# Patient Record
Sex: FEMALE | Race: WHITE | NOT HISPANIC OR LATINO | Employment: FULL TIME | URBAN - METROPOLITAN AREA
[De-identification: names, ages, dates, MRNs, and addresses within clinical notes are randomized per-mention and may not be internally consistent; named-entity substitution may affect disease eponyms.]

---

## 2017-09-14 ENCOUNTER — TELEPHONE (OUTPATIENT)
Dept: HEMATOLOGY/ONCOLOGY | Facility: CLINIC | Age: 62
End: 2017-09-14

## 2017-09-15 ENCOUNTER — TELEPHONE (OUTPATIENT)
Dept: HEMATOLOGY/ONCOLOGY | Facility: CLINIC | Age: 62
End: 2017-09-15

## 2017-09-18 ENCOUNTER — TELEPHONE (OUTPATIENT)
Dept: HEMATOLOGY/ONCOLOGY | Facility: CLINIC | Age: 62
End: 2017-09-18

## 2017-09-19 ENCOUNTER — INITIAL CONSULT (OUTPATIENT)
Dept: HEMATOLOGY/ONCOLOGY | Facility: CLINIC | Age: 62
End: 2017-09-19
Payer: COMMERCIAL

## 2017-09-19 VITALS
SYSTOLIC BLOOD PRESSURE: 122 MMHG | BODY MASS INDEX: 21.6 KG/M2 | HEIGHT: 60 IN | HEART RATE: 63 BPM | RESPIRATION RATE: 18 BRPM | DIASTOLIC BLOOD PRESSURE: 81 MMHG | WEIGHT: 110 LBS

## 2017-09-19 DIAGNOSIS — R79.89 ABNORMAL CBC: ICD-10-CM

## 2017-09-19 DIAGNOSIS — Z51.81 ENCOUNTER FOR MONITORING LOW DOSE ASPIRIN THERAPY: ICD-10-CM

## 2017-09-19 DIAGNOSIS — Z86.718 HISTORY OF DVT (DEEP VEIN THROMBOSIS): ICD-10-CM

## 2017-09-19 DIAGNOSIS — Z79.82 ENCOUNTER FOR MONITORING LOW DOSE ASPIRIN THERAPY: ICD-10-CM

## 2017-09-19 DIAGNOSIS — R55 SYNCOPE, UNSPECIFIED SYNCOPE TYPE: Primary | ICD-10-CM

## 2017-09-19 PROCEDURE — 3008F BODY MASS INDEX DOCD: CPT | Mod: S$GLB,,, | Performed by: INTERNAL MEDICINE

## 2017-09-19 PROCEDURE — 99205 OFFICE O/P NEW HI 60 MIN: CPT | Mod: S$GLB,,, | Performed by: INTERNAL MEDICINE

## 2017-09-19 NOTE — LETTER
September 19, 2017      Tamara Roldan MD  [address] [place] [address]           [place] [facility]  [address] [place] [address]  Phone: [phone]          Patient: Lisbeth Seaman   MR Number: [id number]   YOB: 1955   Date of Visit: 9/19/2017       Dear Dr. Tamara Roldan:    Thank you for referring Lisbeth Seaman to me for evaluation. Attached you will find relevant portions of my assessment and plan of care.    If you have questions, please do not hesitate to call me. I look forward to following Lisbeth Seaman along with you.    Sincerely,    Sarah Thorne MD    Enclosure  CC:  No Recipients    If you would like to receive this communication electronically, please contact [email or web address] or [phone] to request more information on [employer] Link access.    For providers and/or their staff who would like to refer a patient to [facility], please contact us through our one-stop-shop provider referral line, [facility] , at [phone].    If you feel you have received this communication in error or would no longer like to receive these types of communications, please e-mail [email or web address]

## 2017-09-19 NOTE — PROGRESS NOTES
Consult (dr Gonzalez - secondary polycythemia)      Lisbeth Seaman is a 62 y.o.  Pt here after being seen in ER for possible hypercoagulable state. She reports TIA like symptoms. She has fully recovered from the event. She is now on aspirin 81 mg daily. Her family member with her today thought they were told she may have had a seizure. In any event she does have a history of blood clots. She is unsure where the clots were located but she believes it was ten years ago and she did take coumadin. Today she is free of dizziness, no weakness, she is slightly fatigued. Pt states she has never noticed that she snores at night, she does not have pruritus after a warm shower, she does not have a marcia appearance and she does not smoke.      Referral to heme states possible polycythemia    PMH HTN DVT  PSH unremarkable  SOC Hx former smoker: she quit about 9 years ago  No current use of alcohol  Fam hx: Father  with heart attack    Meds noted  Review of patient's allergies indicates:  No Known Allergies  Social History   Substance Use Topics    Smoking status: Not on file    Smokeless tobacco: Not on file    Alcohol use Not on file         CONSTITUTIONAL: No fevers, chills, night sweats, wt. loss, appetite changes pos fatigue  SKIN: no rashes or itching  ENT: No headaches, head trauma, vision changes, or eye pain  LYMPH NODES: None noticed   CV: No chest pain, palpitations.   RESP: No dyspnea on exertion, cough, wheezing, or hemoptysis  GI: No nausea, emesis, diarrhea, constipation, melena, hematochezia, pain.   : No dysuria, hematuria, urgency, or frequency   HEME: No easy bruising, bleeding problems  PSYCHIATRIC: No depression, anxiety, psychosis, hallucinations.  NEURO: No seizures, memory loss, dizziness or difficulty sleeping  MSK: No arthralgias or joint swelling         /81   Pulse 63   Resp 18   Ht 5' (1.524 m)   Wt 49.9 kg (110 lb)   BMI 21.48 kg/m²   Gen: NAD, A and O x3,   Psych: pleasant  affect, normal thought process  Eyes: Pupils round and non dilated, EOM intact  Nose: Nares patent  OP clear, mucosa patent  Neck: suppple, no JVD, trachea midline, no palpable mass, no adenopathy  Lungs: CTAB, no wheezes, no use of accessory muscles  CV: S1S2 with RRR, No mrg  Abd: soft, NTND, + BS, No HSM, no ascites  Extr: No CCE, AD, strength normal, good capillary refill  Neuro: steady gait, CNs grossly intact  Skin: intact, no lesions noted  Rheum: No joint swelling      No lab results available at this time   ER vascular neuro telemedicine note reviewed  Suspected postictal state     No imaging to review      Syncope, unspecified syncope type : TIA vs postictal episode    History of DVT (deep vein thrombosis)  -     Antithrombin III; Future; Expected date: 09/19/2017  -     Cardiolipin antibody; Future; Expected date: 09/19/2017  -     Beta-2 Glycoprotein I Ab,G,A,M; Future; Expected date: 09/19/2017  -     Factor 5 leiden; Future; Expected date: 09/19/2017  -     Factor 8 assay; Future; Expected date: 09/19/2017  -     Plasminogen activity; Future; Expected date: 09/19/2017  -     Protein C antigen, total; Future; Expected date: 09/19/2017  -     Protein S antigen, free; Future; Expected date: 09/19/2017  -     Prothrombin gene mutation; Future; Expected date: 09/19/2017  -     Fibrinogen; Future; Expected date: 09/19/2017  -     Methylmalonic acid, serum; Future; Expected date: 09/19/2017  -     EPO LEVEL; Future; Expected date: 09/19/2017  -     Lactate dehydrogenase; Future; Expected date: 09/19/2017  -     CBC auto differential; Future; Expected date: 09/19/2017  -     Carbon Monoxide, Blood; Future; Expected date: 09/19/2017    Encounter for monitoring low dose aspirin therapy    Abnormal CBC with presumed elevated hemooglobin  -     Antithrombin III; Future; Expected date: 09/19/2017  -     Cardiolipin antibody; Future; Expected date: 09/19/2017  -     Beta-2 Glycoprotein I Ab,G,A,M; Future; Expected  date: 09/19/2017  -     Factor 5 leiden; Future; Expected date: 09/19/2017  -     Factor 8 assay; Future; Expected date: 09/19/2017  -     Plasminogen activity; Future; Expected date: 09/19/2017  -     Protein C antigen, total; Future; Expected date: 09/19/2017  -     Protein S antigen, free; Future; Expected date: 09/19/2017  -     Prothrombin gene mutation; Future; Expected date: 09/19/2017  -     Fibrinogen; Future; Expected date: 09/19/2017  -     Methylmalonic acid, serum; Future; Expected date: 09/19/2017  -     EPO LEVEL; Future; Expected date: 09/19/2017  -     Lactate dehydrogenase; Future; Expected date: 09/19/2017  -     CBC auto differential; Future; Expected date: 09/19/2017  -     Carbon Monoxide, Blood; Future; Expected date: 09/19/2017    Will check results above and make further recs accordingly  Checking for inherited thrombophilia as well as for etiology of possible erythrocytosis  This will help with future management of this pleasant pt  Thank you for allowing me to evaluate and participate in the care of this pleasant patient. Please fell free to call me with any questions or concerns.    Warmly,  Sarah Thorne MD    This note was dictated with Dragon and briefly proofread. Please excuse any sentences that may be unclear or words misspelled

## 2017-10-10 ENCOUNTER — OFFICE VISIT (OUTPATIENT)
Dept: HEMATOLOGY/ONCOLOGY | Facility: CLINIC | Age: 62
End: 2017-10-10
Payer: COMMERCIAL

## 2017-10-10 VITALS
SYSTOLIC BLOOD PRESSURE: 88 MMHG | DIASTOLIC BLOOD PRESSURE: 69 MMHG | HEIGHT: 60 IN | HEART RATE: 73 BPM | BODY MASS INDEX: 21.99 KG/M2 | RESPIRATION RATE: 18 BRPM | WEIGHT: 112 LBS

## 2017-10-10 DIAGNOSIS — Z86.718 HISTORY OF DVT (DEEP VEIN THROMBOSIS): ICD-10-CM

## 2017-10-10 DIAGNOSIS — D75.1 ERYTHROCYTOSIS: Primary | ICD-10-CM

## 2017-10-10 DIAGNOSIS — I95.9 HYPOTENSIVE EPISODE: ICD-10-CM

## 2017-10-10 DIAGNOSIS — E53.8 B12 DEFICIENCY: ICD-10-CM

## 2017-10-10 PROCEDURE — 99215 OFFICE O/P EST HI 40 MIN: CPT | Mod: S$GLB,,, | Performed by: INTERNAL MEDICINE

## 2017-10-10 RX ORDER — LOSARTAN POTASSIUM AND HYDROCHLOROTHIAZIDE 25; 100 MG/1; MG/1
1 TABLET ORAL DAILY
Status: ON HOLD | COMMUNITY
Start: 2017-09-28 | End: 2018-03-03 | Stop reason: HOSPADM

## 2017-10-10 RX ORDER — POTASSIUM CHLORIDE 1500 MG/1
20 TABLET, EXTENDED RELEASE ORAL DAILY
Status: ON HOLD | COMMUNITY
Start: 2017-09-28 | End: 2018-03-07

## 2017-10-10 RX ORDER — ATORVASTATIN CALCIUM 40 MG/1
40 TABLET, FILM COATED ORAL DAILY
Status: ON HOLD | COMMUNITY
Start: 2017-09-28 | End: 2018-03-07

## 2017-10-10 NOTE — LETTER
October 10, 2017        Tamara Roldan MD  [address] [place] [address]             [place] [facility]  [address] [place] [address]  Phone: [phone]   Patient: Lisbeth Seaman   MR Number: [id number]   YOB: 1955   Date of Visit: 10/10/2017       Dear Dr. Roldan:    Thank you for referring Lisbeth Seaman to me for evaluation. Attached you will find relevant portions of my assessment and plan of care.    If you have questions, please do not hesitate to call me. I look forward to following Lisbeth Seaman along with you.    Sincerely,      Sarah Thorne MD            CC  No Recipients    Enclosure

## 2017-10-10 NOTE — PROGRESS NOTES
Follow-up and Results (labs at [place])      Lisbeth Seaman is a 62 y.o.  Pt here after being seen in ER for possible hypercoagulable state. She was found to have an elevated RBC count and is here todiscuss lab results  She aslo had a hypercoag workup performed  Results reveal no evidence of inherited thrombophilia     Pt is not having dizziness or weakness , her pressure is low today  She has not eaten yet today    Current Outpatient Prescriptions:     atorvastatin (LIPITOR) 40 MG tablet, , Disp: , Rfl:     losartan-hydrochlorothiazide 100-25 mg (HYZAAR) 100-25 mg per tablet, , Disp: , Rfl:     potassium chloride (K-TAB) 20 mEq, , Disp: , Rfl:       CONSTITUTIONAL: No fevers, chills, night sweats, wt. loss, appetite changes pos fatigue  SKIN: no rashes or itching  ENT: No headaches, head trauma, vision changes, or eye pain  LYMPH NODES: None noticed   CV: No chest pain, palpitations.   RESP: No dyspnea on exertion, cough, wheezing, or hemoptysis  GI: No nausea, emesis, diarrhea, constipation, melena, hematochezia, pain.   : No dysuria, hematuria, urgency, or frequency   HEME: No easy bruising  PSYCHIATRIC: No depression, anxiety  NEURO: No seizures, memory loss, dizziness or difficulty sleeping  MSK: No arthralgias or joint swelling         BP (!) 88/69   Pulse 73   Resp 18   Ht 5' (1.524 m)   Wt 50.8 kg (112 lb)   BMI 21.87 kg/m²   Gen: NAD, A and O x3,   Psych: pleasant affect, normal thought process  Eyes: Pupils round and non dilated, EOM intact  Nose: Nares patent  OP clear, mucosa patent  Neck: suppple, no JVD, trachea midline  Lungsno use of accessory muscles  CV: S1S2 with RR  Abd: soft no ascites  Extr: No CCE, AD  Skin: intact, no lesions noted  Rheum: No joint swelling    CO 2.0  Plasminogen 112  epo 2.3 low  Methylmalonic acid 1571 elevated   No lab results available at this time   ER vascular neuro telemedicine note reviewed  Suspected postictal state   Hb above 16     Erythrocytosis  -      Jak2 V617F Mutation Detection, Blood; Future; Expected date: 10/10/2017  -     JAK2 Exon 12 And Other Non-V617 Mutation Detection, Bld; Future; Expected date: 10/10/2017  -     US Abdomen Complete; Future; Expected date: 10/10/2017  -     CBC auto differential; Future; Expected date: 10/10/2017  -     CT Biopsy Bone Deep (xpd); Future; Expected date: 10/10/2017  -     Leukemia/Lymphoma Screen - Bone Marrow Right Posterior Iliac Crest; Future; Expected date: 10/10/2017  -     Chromosome Analysis, Bone Marrow; Future; Expected date: 10/10/2017  -     Iron Stain, Bone Marrow; Future; Expected date: 10/10/2017  -     JAK2 V617F Mutation Detection, Bone Marrow; Future; Expected date: 10/10/2017    Hypotensive episode  -     Jak2 V617F Mutation Detection, Blood; Future; Expected date: 10/10/2017  -     JAK2 Exon 12 And Other Non-V617 Mutation Detection, Bld; Future; Expected date: 10/10/2017  -     US Abdomen Complete; Future; Expected date: 10/10/2017  -     CBC auto differential; Future; Expected date: 10/10/2017    History of DVT (deep vein thrombosis)  -     CT Biopsy Bone Deep (xpd); Future; Expected date: 10/10/2017  -     Leukemia/Lymphoma Screen - Bone Marrow Right Posterior Iliac Crest; Future; Expected date: 10/10/2017  -     Chromosome Analysis, Bone Marrow; Future; Expected date: 10/10/2017  -     Iron Stain, Bone Marrow; Future; Expected date: 10/10/2017  -     JAK2 V617F Mutation Detection, Bone Marrow; Future; Expected date: 10/10/2017    B12 deficiency  -     CT Biopsy Bone Deep (xpd); Future; Expected date: 10/10/2017  -     Leukemia/Lymphoma Screen - Bone Marrow Right Posterior Iliac Crest; Future; Expected date: 10/10/2017  -     Chromosome Analysis, Bone Marrow; Future; Expected date: 10/10/2017  -     Iron Stain, Bone Marrow; Future; Expected date: 10/10/2017  -     JAK2 V617F Mutation Detection, Bone Marrow; Future; Expected date: 10/10/2017      I am concerned about her hypotension she needs to eat  a salty meal and I'm recommending that she cut her blood pressure pill in half and see her primary care physician tomorrow  I also explained her B12 deficiency.  I don't want to replace her B12 as it yet due to the fact that she has erythrocytosis and replace her B12 will worsen the erythrocytosis and could cause a hypercoagulable state.  I'm sending her for a bone marrow biopsy to evaluate further.  Usually polycythemia vera will present with Woodrow 2 mutation and I'm checking her for both exon deletions to verify such.  She'll also undergone abdominal ultrasound to look for splenomegaly.  When she has the bone marrow biopsy further lab work and ultrasound performed and I'll see her back in clinic and make further recommendations.  I explained to her and her son is soon as I know is overstimulated her marrow should be able to help prevent further overstimulation released off her phlebotomy to get her counts down.  I explained that her bone marrow is not being stimulated by her kidneys as evidenced by an erythropoietin being lower than normal at 2.3.  Carbon monoxide level was also normal  She is to eat regularly throughout the day    Thank you for allowing me to evaluate and participate in the care of this pleasant patient. Please fell free to call me with any questions or concerns.    Sarah Patterson MD    This note was dictated with Dragon and briefly proofread. Please excuse any sentences that may be unclear or words misspelled

## 2017-10-12 DIAGNOSIS — R79.89 ABNORMAL CBC: Primary | ICD-10-CM

## 2017-10-12 NOTE — NURSING
Returned call from [facility], patient gave permission to speak with Scooter her son, notified of date and time of procedure, Scooter requested to reschedule, call transferred to Kath in scheduling. AR

## 2017-10-17 NOTE — NURSING
reutned pts son Scooter call, no answer left voicemail explaining these instructions could not be left on a voicemail, he will need a paper to write them down as we discuss them and requested a return call. AR

## 2017-10-18 NOTE — NURSING
Spoke to patients maryanne Viveros, procedure date 10/25/17 procedure time 10 am, arrival time 830, all preop instructions and Scooter repeated them back verbalized understanding, AR

## 2017-10-18 NOTE — NURSING
pts son Scooter left voicemail stating he didn't go to work today until 4 PM, nurse returned call no answer and now voicemail is fulll and cannot accept any other messages. AR

## 2017-10-18 NOTE — NURSING
Placed call to patients maryanne Helms to provide preop instructions for procedure, no answer left voicemail requesting return call. AR

## 2017-10-20 ENCOUNTER — HOSPITAL ENCOUNTER (OUTPATIENT)
Dept: RADIOLOGY | Facility: HOSPITAL | Age: 62
Discharge: HOME | End: 2017-10-20
Attending: INTERNAL MEDICINE
Payer: COMMERCIAL

## 2017-10-25 ENCOUNTER — HOSPITAL ENCOUNTER (OUTPATIENT)
Dept: RADIOLOGY | Facility: HOSPITAL | Age: 62
Discharge: HOME | End: 2017-10-25
Attending: INTERNAL MEDICINE
Payer: COMMERCIAL

## 2017-10-25 VITALS
HEIGHT: 61 IN | SYSTOLIC BLOOD PRESSURE: 117 MMHG | TEMPERATURE: 98 F | HEART RATE: 68 BPM | OXYGEN SATURATION: 99 % | RESPIRATION RATE: 16 BRPM | BODY MASS INDEX: 21.14 KG/M2 | DIASTOLIC BLOOD PRESSURE: 88 MMHG | WEIGHT: 112 LBS

## 2017-10-25 DIAGNOSIS — Z86.718 HISTORY OF DVT (DEEP VEIN THROMBOSIS): ICD-10-CM

## 2017-10-25 DIAGNOSIS — I95.9 HYPOTENSIVE EPISODE: ICD-10-CM

## 2017-10-25 DIAGNOSIS — E53.8 B12 DEFICIENCY: ICD-10-CM

## 2017-10-25 DIAGNOSIS — D75.1 ERYTHROCYTOSIS: ICD-10-CM

## 2017-10-25 PROCEDURE — 76700 US EXAM ABDOM COMPLETE: CPT | Mod: TC

## 2017-10-25 PROCEDURE — 88341 IMHCHEM/IMCYTCHM EA ADD ANTB: CPT | Performed by: PATHOLOGY

## 2017-10-25 PROCEDURE — 88189 FLOWCYTOMETRY/READ 16 & >: CPT | Mod: ,,, | Performed by: PATHOLOGY

## 2017-10-25 PROCEDURE — 88185 FLOWCYTOMETRY/TC ADD-ON: CPT | Performed by: PATHOLOGY

## 2017-10-25 PROCEDURE — 77012 CT SCAN FOR NEEDLE BIOPSY: CPT | Mod: TC

## 2017-10-25 PROCEDURE — 85097 BONE MARROW INTERPRETATION: CPT | Mod: ,,, | Performed by: PATHOLOGY

## 2017-10-25 PROCEDURE — 88291 CYTO/MOLECULAR REPORT: CPT

## 2017-10-25 PROCEDURE — 88342 IMHCHEM/IMCYTCHM 1ST ANTB: CPT | Mod: 26,59,, | Performed by: PATHOLOGY

## 2017-10-25 PROCEDURE — 88311 DECALCIFY TISSUE: CPT | Mod: 26,,, | Performed by: PATHOLOGY

## 2017-10-25 PROCEDURE — 99900104 DSU ONLY-NO CHARGE-EA ADD'L HR (STAT)

## 2017-10-25 PROCEDURE — 76700 US EXAM ABDOM COMPLETE: CPT | Mod: 26,,, | Performed by: RADIOLOGY

## 2017-10-25 PROCEDURE — 88264 CHROMOSOME ANALYSIS 20-25: CPT

## 2017-10-25 PROCEDURE — 88305 TISSUE EXAM BY PATHOLOGIST: CPT | Mod: 26,,, | Performed by: PATHOLOGY

## 2017-10-25 PROCEDURE — 88237 TISSUE CULTURE BONE MARROW: CPT

## 2017-10-25 PROCEDURE — 88313 SPECIAL STAINS GROUP 2: CPT

## 2017-10-25 PROCEDURE — 77012 CT SCAN FOR NEEDLE BIOPSY: CPT | Mod: 26,,, | Performed by: RADIOLOGY

## 2017-10-25 PROCEDURE — 81270 JAK2 GENE: CPT

## 2017-10-25 PROCEDURE — 88184 FLOWCYTOMETRY/ TC 1 MARKER: CPT | Performed by: PATHOLOGY

## 2017-10-25 PROCEDURE — 63600175 PHARM REV CODE 636 W HCPCS

## 2017-10-25 PROCEDURE — 99152 MOD SED SAME PHYS/QHP 5/>YRS: CPT | Mod: ,,, | Performed by: RADIOLOGY

## 2017-10-25 PROCEDURE — 38221 DX BONE MARROW BIOPSIES: CPT | Mod: ,,, | Performed by: RADIOLOGY

## 2017-10-25 PROCEDURE — 88313 SPECIAL STAINS GROUP 2: CPT | Mod: 26,,, | Performed by: PATHOLOGY

## 2017-10-25 PROCEDURE — 88305 TISSUE EXAM BY PATHOLOGIST: CPT | Performed by: PATHOLOGY

## 2017-10-25 PROCEDURE — 99900103 DSU ONLY-NO CHARGE-INITIAL HR (STAT)

## 2017-10-25 PROCEDURE — 88341 IMHCHEM/IMCYTCHM EA ADD ANTB: CPT | Mod: 26,59,, | Performed by: PATHOLOGY

## 2017-10-25 RX ORDER — MIDAZOLAM HYDROCHLORIDE 5 MG/ML
INJECTION INTRAMUSCULAR; INTRAVENOUS
Status: COMPLETED
Start: 2017-10-25 | End: 2017-10-25

## 2017-10-25 RX ORDER — LIDOCAINE HYDROCHLORIDE 10 MG/ML
INJECTION, SOLUTION EPIDURAL; INFILTRATION; INTRACAUDAL; PERINEURAL
Status: DISPENSED
Start: 2017-10-25 | End: 2017-10-25

## 2017-10-25 RX ORDER — FENTANYL CITRATE 50 UG/ML
25 INJECTION, SOLUTION INTRAMUSCULAR; INTRAVENOUS ONCE
Status: COMPLETED | OUTPATIENT
Start: 2017-10-25 | End: 2017-10-25

## 2017-10-25 RX ORDER — MIDAZOLAM HYDROCHLORIDE 5 MG/ML
1 INJECTION INTRAMUSCULAR; INTRAVENOUS ONCE
Status: COMPLETED | OUTPATIENT
Start: 2017-10-25 | End: 2017-10-25

## 2017-10-25 RX ORDER — FENTANYL CITRATE 50 UG/ML
INJECTION, SOLUTION INTRAMUSCULAR; INTRAVENOUS
Status: COMPLETED
Start: 2017-10-25 | End: 2017-10-25

## 2017-10-25 RX ADMIN — FENTANYL CITRATE 25 MCG: 50 INJECTION INTRAMUSCULAR; INTRAVENOUS at 10:10

## 2017-10-25 RX ADMIN — MIDAZOLAM HYDROCHLORIDE 1 MG: 5 INJECTION INTRAMUSCULAR; INTRAVENOUS at 10:10

## 2017-10-25 RX ADMIN — FENTANYL CITRATE 25 MCG: 50 INJECTION, SOLUTION INTRAMUSCULAR; INTRAVENOUS at 10:10

## 2017-10-25 RX ADMIN — MIDAZOLAM 1 MG: 5 INJECTION INTRAMUSCULAR; INTRAVENOUS at 10:10

## 2017-10-25 NOTE — H&P (VIEW-ONLY)
Follow-up and Results (labs at [facility])      Lisbeth Seaman is a 62 y.o.  Pt here after being seen in ER for possible hypercoagulable state. She was found to have an elevated RBC count and is here todiscuss lab results  She aslo had a hypercoag workup performed  Results reveal no evidence of inherited thrombophilia     Pt is not having dizziness or weakness , her pressure is low today  She has not eaten yet today    Current Outpatient Prescriptions:     atorvastatin (LIPITOR) 40 MG tablet, , Disp: , Rfl:     losartan-hydrochlorothiazide 100-25 mg (HYZAAR) 100-25 mg per tablet, , Disp: , Rfl:     potassium chloride (K-TAB) 20 mEq, , Disp: , Rfl:       CONSTITUTIONAL: No fevers, chills, night sweats, wt. loss, appetite changes pos fatigue  SKIN: no rashes or itching  ENT: No headaches, head trauma, vision changes, or eye pain  LYMPH NODES: None noticed   CV: No chest pain, palpitations.   RESP: No dyspnea on exertion, cough, wheezing, or hemoptysis  GI: No nausea, emesis, diarrhea, constipation, melena, hematochezia, pain.   : No dysuria, hematuria, urgency, or frequency   HEME: No easy bruising  PSYCHIATRIC: No depression, anxiety  NEURO: No seizures, memory loss, dizziness or difficulty sleeping  MSK: No arthralgias or joint swelling         BP (!) 88/69   Pulse 73   Resp 18   Ht 5' (1.524 m)   Wt 50.8 kg (112 lb)   BMI 21.87 kg/m²   Gen: NAD, A and O x3,   Psych: pleasant affect, normal thought process  Eyes: Pupils round and non dilated, EOM intact  Nose: Nares patent  OP clear, mucosa patent  Neck: suppple, no JVD, trachea midline  Lungsno use of accessory muscles  CV: S1S2 with RR  Abd: soft no ascites  Extr: No CCE, AD  Skin: intact, no lesions noted  Rheum: No joint swelling    CO 2.0  Plasminogen 112  epo 2.3 low  Methylmalonic acid 1571 elevated   No lab results available at this time   ER vascular neuro telemedicine note reviewed  Suspected postictal state   Hb above 16     Erythrocytosis  -      Jak2 V617F Mutation Detection, Blood; Future; Expected date: 10/10/2017  -     JAK2 Exon 12 And Other Non-V617 Mutation Detection, Bld; Future; Expected date: 10/10/2017  -     US Abdomen Complete; Future; Expected date: 10/10/2017  -     CBC auto differential; Future; Expected date: 10/10/2017  -     CT Biopsy Bone Deep (xpd); Future; Expected date: 10/10/2017  -     Leukemia/Lymphoma Screen - Bone Marrow Right Posterior Iliac Crest; Future; Expected date: 10/10/2017  -     Chromosome Analysis, Bone Marrow; Future; Expected date: 10/10/2017  -     Iron Stain, Bone Marrow; Future; Expected date: 10/10/2017  -     JAK2 V617F Mutation Detection, Bone Marrow; Future; Expected date: 10/10/2017    Hypotensive episode  -     Jak2 V617F Mutation Detection, Blood; Future; Expected date: 10/10/2017  -     JAK2 Exon 12 And Other Non-V617 Mutation Detection, Bld; Future; Expected date: 10/10/2017  -     US Abdomen Complete; Future; Expected date: 10/10/2017  -     CBC auto differential; Future; Expected date: 10/10/2017    History of DVT (deep vein thrombosis)  -     CT Biopsy Bone Deep (xpd); Future; Expected date: 10/10/2017  -     Leukemia/Lymphoma Screen - Bone Marrow Right Posterior Iliac Crest; Future; Expected date: 10/10/2017  -     Chromosome Analysis, Bone Marrow; Future; Expected date: 10/10/2017  -     Iron Stain, Bone Marrow; Future; Expected date: 10/10/2017  -     JAK2 V617F Mutation Detection, Bone Marrow; Future; Expected date: 10/10/2017    B12 deficiency  -     CT Biopsy Bone Deep (xpd); Future; Expected date: 10/10/2017  -     Leukemia/Lymphoma Screen - Bone Marrow Right Posterior Iliac Crest; Future; Expected date: 10/10/2017  -     Chromosome Analysis, Bone Marrow; Future; Expected date: 10/10/2017  -     Iron Stain, Bone Marrow; Future; Expected date: 10/10/2017  -     JAK2 V617F Mutation Detection, Bone Marrow; Future; Expected date: 10/10/2017      I am concerned about her hypotension she needs to eat  a salty meal and I'm recommending that she cut her blood pressure pill in half and see her primary care physician tomorrow  I also explained her B12 deficiency.  I don't want to replace her B12 as it yet due to the fact that she has erythrocytosis and replace her B12 will worsen the erythrocytosis and could cause a hypercoagulable state.  I'm sending her for a bone marrow biopsy to evaluate further.  Usually polycythemia vera will present with Woodrow 2 mutation and I'm checking her for both exon deletions to verify such.  She'll also undergone abdominal ultrasound to look for splenomegaly.  When she has the bone marrow biopsy further lab work and ultrasound performed and I'll see her back in clinic and make further recommendations.  I explained to her and her son is soon as I know is overstimulated her marrow should be able to help prevent further overstimulation released off her phlebotomy to get her counts down.  I explained that her bone marrow is not being stimulated by her kidneys as evidenced by an erythropoietin being lower than normal at 2.3.  Carbon monoxide level was also normal  She is to eat regularly throughout the day    Thank you for allowing me to evaluate and participate in the care of this pleasant patient. Please fell free to call me with any questions or concerns.    Sarah Patterson MD    This note was dictated with Dragon and briefly proofread. Please excuse any sentences that may be unclear or words misspelled

## 2017-10-25 NOTE — OR NURSING
0915-Admitted pt to [facility] for radiology CT BX. Pt oriented to unit and procedures. Assessment completed. Saline lock started. Consents to be done in radiology. Comfort assured. jade Flores rn notified that pt was ready at 0961

## 2017-10-25 NOTE — PROGRESS NOTES
Sitting on side of bed, no complaints.  Band aid to procedure site dry and intact.  Discharge instructions reinforced,, pt and son voice understanding

## 2017-10-25 NOTE — INTERVAL H&P NOTE
The patient has been examined and the H&P has been reviewed:    I concur with the findings and no changes have occurred since H&P was written.Plan bone marrow biopsy with moderate sedation.         There are no hospital problems to display for this patient.

## 2017-10-25 NOTE — DISCHARGE SUMMARY
Radiology Discharge Summary      Admit date: 10/25/2017  8:44 AM  Discharge date: October 25, 2017    Instructions Given to patient: YesVerbal    Diet: Regular    Activity:NO Restrictions    Medications on discharge (List): Refer to Discharge Medication List    Hospital Course: Pt was brought to CT and following informed consent, received moderate sedation 25 mcg Fentanyl and Versed 1 mg IV and was observed by Dr. Urbina and BRYON Rojas for BP , heart rate and pulser oximetry from 10:47 to 10:59. P tsent to  surgery for recovery for 3 hours and DC home.     Description of Condition on Discharge: stable    Discharge Disposition: Home    Discharge Diagnosis: hypercoagubility    Patient to Follow up with Dr. Thorne

## 2017-10-25 NOTE — DISCHARGE INSTRUCTIONS
Bone Marrow Aspiration and Biopsy    Bone marrow is the soft, spongy part inside bones. It makes most of the bodys blood cells. Aspiration and biopsy are procedures done to take a sample of bone marrow out of the body for examination. To perform either procedure, a needle is inserted into one of your bones, usually the back of the hip bone. Then a sample of bone marrow is removed.   If a sample of fluid and cells is taken, it is called bone marrow aspiration. If a solid sample of bone marrow tissue is removed, it is called bone marrow biopsy. In either case, the samples are sent to a lab and studied. The procedures can be done alone, but are most often done together. This sheet tells you more about what to expect.  Why the procedures are done  The procedures may be done for a number of reasons. They can help diagnose certain blood or bone marrow disorders or infections. They may help find certain cancers, such as leukemia. They can show if cancer in other areas of the body has spread to the bone marrow. They can be used during cancer treatment, such as chemotherapy, to monitor treatment progress. And they may be done before certain treatments, such as a stem cell transplant, which require a bone marrow sample. Your healthcare provider will explain why you need the procedure and answer any questions you have.  Preparing for the procedure  Prepare for the procedure as told. In addition:  · Tell your healthcare provider:  ¨ What medicines you take. This includes blood thinners, such as aspirin. This also includes over-the-counter medicines, herbs, and other supplements. You may need to stop taking some or all of them before the procedure.  ¨ If you are allergic to any medicines. Also mention if you have ever had a reaction to medicines used during other tests or procedures in the past.  ¨ If you have a history of bleeding problems.  ¨ If you are pregnant or may be pregnant.  · Follow any directions youre given for  not eating or drinking before the procedure.  The day of the procedure  The procedures can be done at a hospital, clinic, or healthcare providers office. They are performed by a healthcare provider or trained healthcare provider. Whether youre having one or both procedures, plan to be at the facility for 1 to 2 hours. Youll likely go home the same day.  Before the procedure begins  What to expect before the procedure:  · Youll change into a patient gown.  · An IV line may be put into a vein in your arm or hand. This line supplies fluids and medicines.  · Youll be given a sedative to help you relax, if needed. This medicine is given by pill, injection, or through the IV line.  During the procedure  What to expect during the procedure:  · Youll lie on your side or your stomach.  · The site to be used for the bone marrow samples is marked and cleaned. The most common site is the back of the hip bone. Less common sites include the front of the hip bone or breastbone.  · Numbing medicine (local anesthesia) is injected at the site.  · A small cut is made through the numbed skin.  · One or both procedures are then done. Be sure to lie still for each procedure. It is normal to feel some pressure or pain during each procedure.  ¨ For the bone marrow aspiration, a thin needle is put through the cut and into the bone. A syringe is then attached to the needle and used to remove a sample of bone marrow fluid and cells.  ¨ For the bone marrow biopsy, a different needle is put through the same cut and into the bone. A small amount of bone marrow tissue is then removed.  · The samples are sent to a lab to be evaluated.  · When the procedure is complete, pressure is applied to the site for 10 to 15 minutes to help stop bleeding. The site is then bandaged.  After the procedure  Most people can go home after a short period of observation. If you need it, youll be given medicine to manage pain. If you were given a sedative, you  may be taken to a recovery room to rest until the medicine wears off. An adult family member or friend must drive you home afterward.  Recovering at home  Once at home, follow any instructions youre given. Be sure to:  · Take all medicines as directed.  · Care for the procedure site as instructed.  · Check for signs of infection at the procedure site (see below).  · Avoid getting the procedure site wet. Do not bathe or shower until your healthcare providers say it is OK to do so. If you wish, you may wash with a sponge or washcloth.  · Avoid heavy lifting and other strenuous activities as directed.     Call the healthcare provider  Call your healthcare provider if you have any of the following:  · Fever of 100.4 ºF (38 ºC) or higher, or as directed by your healthcare provider  · Signs of infection at the procedure site, such as increased redness or swelling, warmth, worsening pain, bleeding, or foul-smelling drainage   Follow-up  Your healthcare provider will discuss the results with you when they are ready. This is usually within a week after the procedure.     Risks and possible complications of these procedures  These include:  · Severe bleeding or bruising at the procedure site  · Infection at the procedure site  · Bone fracture  · Bone infection   Date Last Reviewed: 10/8/2015  © 4580-7691 [employer]. [address] [place] [address], [place] [address]. All rights reserved. This information is not intended as a substitute for professional medical care. Always follow your healthcare professional's instructions.      Post op instructions for prevention of DVT  What is deep vein thrombosis?  Deep vein thrombosis (DVT) is the medical term for blood clots in the deep veins of the leg.  These blood clots can be dangerous.  A DVT can block a blood vessel and keep blood from getting where it needs to go.  Another problem is that the clot can travel to other parts of the body such as the lungs.  A clot that  travels to the lungs is called a pulmonary embolus (PE) and can cause serious problems with breathing which can lead to death.  Am I at risk for DVT/PE?  If you are not very active, you are at risk of DVT.  Anyone confined to bed, sitting for long periods of time, recovering from surgery, etc. increases the risk of DVT.  Other risk factors are cancer diagnosis, certain medications, estrogen replacement in any form,older age, obesity, pregnancy, smoking, history of clotting disorders, and dehydration.  How will I know if I have a DVT?   Swelling in the lower leg   Pain   Warmth, redness, hardness or bulging of the vein  If you have any of these symptoms, call your doctors office right away.  Some people will not have any symptoms until the clot moves to the lungs.  What are the symptoms of a PE?   Panting, shortness of breath, or trouble breathing   Sharp, knife-like chest pain when you breathe   Coughing or coughing up blood   Rapid heartbeat  If you have any of these symptoms or get worse quickly, call 911 for emergency treatment.  How can I prevent a DVT?   Avoid long periods of inactivity and dont cross your legs--get up and walk around every hour or so.   Stay active--walking after surgery is highly encouraged.  This means you should get out of the house and walk in the neighborhood.  Going up and down stairs will not impair healing (unless advised against such activity by your doctor).     Drink plenty of noncaffeinated, nonalcoholic fluids each day to prevent dehydration.   Wear special support stockings, if they have been advised by your doctor.   If you travel, stop at least once an hour and walk around.   Avoid smoking (assistance with stopping is available through your healthcare provider)  Always notify your doctor if you are not able to follow the post operative instructions that are given to you at the time of discharge.  It may be necessary to prescribe one of the medications available  "to prevent DVT.We hope your stay was comfortable as you heal now, mend and rest.    For we have enjoyed taking care of you by giving your our best.    And as you get better, by regaining your health and strength;   We count it as a privilege to have served you and hope your time at [facility] was well spent.      Thank  You!!!Discharge Instructions: After Your Surgery/Procedure  Youve just had surgery. During surgery you were given medicine called anesthesia to keep you relaxed and free of pain. After surgery you may have some pain or nausea. This is common. Here are some tips for feeling better and getting well after surgery.     Stay on schedule with your medication.   Going home  Your doctor or nurse will show you how to take care of yourself when you go home. He or she will also answer your questions. Have an adult family member or friend drive you home.      For your safety we recommend these precaution for the first 24 hours after your procedure:  · Do not drive or use heavy equipment.  · Do not make important decisions or sign legal papers.  · Do not drink alcohol.  · Have someone stay with you, if needed. He or she can watch for problems and help keep you safe.  · Your concentration, balance, coordination, and judgement may be impaired for many hours after anesthesia.  Use caution when ambulating or standing up.     · You may feel weak and "washed out" after anesthesia and surgery.      Subtle residual effects of general anesthesia or sedation with regional / local anesthesia can last more than 24 hours.  Rest for the remainder of the day or longer if your Doctor/Surgeon has advised you to do so.  Although you may feel normal within the first 24 hours, your reflexes and mental ability may be impaired without you realizing it.  You may feel dizzy, lightheaded or sleepy for 24 hours or longer.      Be sure to go to all follow-up visits with your doctor. And rest after your surgery for as long as your doctor " tells you to.  Coping with pain  If you have pain after surgery, pain medicine will help you feel better. Take it as told, before pain becomes severe. Also, ask your doctor or pharmacist about other ways to control pain. This might be with heat, ice, or relaxation. And follow any other instructions your surgeon or nurse gives you.  Tips for taking pain medicine  To get the best relief possible, remember these points:  · Pain medicines can upset your stomach. Taking them with a little food may help.  · Most pain relievers taken by mouth need at least 20 to 30 minutes to start to work.  · Taking medicine on a schedule can help you remember to take it. Try to time your medicine so that you can take it before starting an activity. This might be before you get dressed, go for a walk, or sit down for dinner.  · Constipation is a common side effect of pain medicines. Call your doctor before taking any medicines such as laxatives or stool softeners to help ease constipation. Also ask if you should skip any foods. Drinking lots of fluids and eating foods such as fruits and vegetables that are high in fiber can also help. Remember, do not take laxatives unless your surgeon has prescribed them.  · Drinking alcohol and taking pain medicine can cause dizziness and slow your breathing. It can even be deadly. Do not drink alcohol while taking pain medicine.  · Pain medicine can make you react more slowly to things. Do not drive or run machinery while taking pain medicine.  Your health care provider may tell you to take acetaminophen to help ease your pain. Ask him or her how much you are supposed to take each day. Acetaminophen or other pain relievers may interact with your prescription medicines or other over-the-counter (OTC) drugs. Some prescription medicines have acetaminophen and other ingredients. Using both prescription and OTC acetaminophen for pain can cause you to overdose. Read the labels on your OTC medicines with  care. This will help you to clearly know the list of ingredients, how much to take, and any warnings. It may also help you not take too much acetaminophen. If you have questions or do not understand the information, ask your pharmacist or health care provider to explain it to you before you take the OTC medicine.  Managing nausea  Some people have an upset stomach after surgery. This is often because of anesthesia, pain, or pain medicine, or the stress of surgery. These tips will help you handle nausea and eat healthy foods as you get better. If you were on a special food plan before surgery, ask your doctor if you should follow it while you get better. These tips may help:  · Do not push yourself to eat. Your body will tell you when to eat and how much.  · Start off with clear liquids and soup. They are easier to digest.  · Next try semi-solid foods, such as mashed potatoes, applesauce, and gelatin, as you feel ready.  · Slowly move to solid foods. Dont eat fatty, rich, or spicy foods at first.  · Do not force yourself to have 3 large meals a day. Instead eat smaller amounts more often.  · Take pain medicines with a small amount of solid food, such as crackers or toast, to avoid nausea.     Call your surgeon if  · You still have pain an hour after taking medicine. The medicine may not be strong enough.  · You feel too sleepy, dizzy, or groggy. The medicine may be too strong.  · You have side effects like nausea, vomiting, or skin changes, such as rash, itching, or hives.       If you have obstructive sleep apnea  You were given anesthesia medicine during surgery to keep you comfortable and free of pain. After surgery, you may have more apnea spells because of this medicine and other medicines you were given. The spells may last longer than usual.   At home:  · Keep using the continuous positive airway pressure (CPAP) device when you sleep. Unless your health care provider tells you not to, use it when you sleep,  day or night. CPAP is a common device used to treat obstructive sleep apnea.  · Talk with your provider before taking any pain medicine, muscle relaxants, or sedatives. Your provider will tell you about the possible dangers of taking these medicines.  © 0457-9326 [employer]. [address] [place] [address]. All rights reserved. This information is not intended as a substitute for professional medical care. Always follow your healthcare professional's instructions.  Using Opioids for Pain Management     Your doctor has given instructions for you to take an opioid.  This is a drug for bad pain.  It helps control pain without causing bleeding and kidney problems.  Common opioid names are morphine, hydromorphone, oxycodone, and methadone. These drugs are called narcotics.    There are several safety concerns you need to know.     · It is against the law to give or sell this drug to another person.  You must keep this medicine safely locked.    · You may have side effects from taking this medication.  These include nausea, itching, sweating, sleepiness, a change in your ability to breathe, and depression.  · Do not take alcohol or sleeping pills opioids.    · Long-term opoid use may no longer giver you relief from pain.  It can cause you stomach pain, mental anxiety, and headaches.  Long-term opoid use can potentially lead to unlawful street drug abuse and reduce your ability to stay employed.    · Your body may become opioid tolerant if you need to take more to get relief.    · You must stop taking opioids if you begin having more pain as a result of the medicine.    · Opioid withdrawal occurs when you have to stop taking the drug.  It can cause you to have nausea, vomiting, diarrhea, stomach pain, anxiety, and dilated pupils in your eyes. This condition means you are opioid dependent.    · Addiction is a drug induced brain disease. It means there are changes in how your brain is working.   "Children, teens, and young adults under 25 years old are more likely to get addicted to opioids.      · Addiction can happen with repeated opioid use.  It does not happen with short-term use of two weeks or less.       For more information, please speak with your doctor or pharmacist.  Discharge Instructions: After Your Surgery/Procedure  Youve just had surgery. During surgery you were given medicine called anesthesia to keep you relaxed and free of pain. After surgery you may have some pain or nausea. This is common. Here are some tips for feeling better and getting well after surgery.     Stay on schedule with your medication.   Going home  Your doctor or nurse will show you how to take care of yourself when you go home. He or she will also answer your questions. Have an adult family member or friend drive you home.      For your safety we recommend these precaution for the first 24 hours after your procedure:  · Do not drive or use heavy equipment.  · Do not make important decisions or sign legal papers.  · Do not drink alcohol.  · Have someone stay with you, if needed. He or she can watch for problems and help keep you safe.  · Your concentration, balance, coordination, and judgement may be impaired for many hours after anesthesia.  Use caution when ambulating or standing up.     · You may feel weak and "washed out" after anesthesia and surgery.      Subtle residual effects of general anesthesia or sedation with regional / local anesthesia can last more than 24 hours.  Rest for the remainder of the day or longer if your Doctor/Surgeon has advised you to do so.  Although you may feel normal within the first 24 hours, your reflexes and mental ability may be impaired without you realizing it.  You may feel dizzy, lightheaded or sleepy for 24 hours or longer.      Be sure to go to all follow-up visits with your doctor. And rest after your surgery for as long as your doctor tells you to.  Coping with pain  If you " have pain after surgery, pain medicine will help you feel better. Take it as told, before pain becomes severe. Also, ask your doctor or pharmacist about other ways to control pain. This might be with heat, ice, or relaxation. And follow any other instructions your surgeon or nurse gives you.  Tips for taking pain medicine  To get the best relief possible, remember these points:  · Pain medicines can upset your stomach. Taking them with a little food may help.  · Most pain relievers taken by mouth need at least 20 to 30 minutes to start to work.  · Taking medicine on a schedule can help you remember to take it. Try to time your medicine so that you can take it before starting an activity. This might be before you get dressed, go for a walk, or sit down for dinner.  · Constipation is a common side effect of pain medicines. Call your doctor before taking any medicines such as laxatives or stool softeners to help ease constipation. Also ask if you should skip any foods. Drinking lots of fluids and eating foods such as fruits and vegetables that are high in fiber can also help. Remember, do not take laxatives unless your surgeon has prescribed them.  · Drinking alcohol and taking pain medicine can cause dizziness and slow your breathing. It can even be deadly. Do not drink alcohol while taking pain medicine.  · Pain medicine can make you react more slowly to things. Do not drive or run machinery while taking pain medicine.  Your health care provider may tell you to take acetaminophen to help ease your pain. Ask him or her how much you are supposed to take each day. Acetaminophen or other pain relievers may interact with your prescription medicines or other over-the-counter (OTC) drugs. Some prescription medicines have acetaminophen and other ingredients. Using both prescription and OTC acetaminophen for pain can cause you to overdose. Read the labels on your OTC medicines with care. This will help you to clearly know the  list of ingredients, how much to take, and any warnings. It may also help you not take too much acetaminophen. If you have questions or do not understand the information, ask your pharmacist or health care provider to explain it to you before you take the OTC medicine.  Managing nausea  Some people have an upset stomach after surgery. This is often because of anesthesia, pain, or pain medicine, or the stress of surgery. These tips will help you handle nausea and eat healthy foods as you get better. If you were on a special food plan before surgery, ask your doctor if you should follow it while you get better. These tips may help:  · Do not push yourself to eat. Your body will tell you when to eat and how much.  · Start off with clear liquids and soup. They are easier to digest.  · Next try semi-solid foods, such as mashed potatoes, applesauce, and gelatin, as you feel ready.  · Slowly move to solid foods. Dont eat fatty, rich, or spicy foods at first.  · Do not force yourself to have 3 large meals a day. Instead eat smaller amounts more often.  · Take pain medicines with a small amount of solid food, such as crackers or toast, to avoid nausea.     Call your surgeon if  · You still have pain an hour after taking medicine. The medicine may not be strong enough.  · You feel too sleepy, dizzy, or groggy. The medicine may be too strong.  · You have side effects like nausea, vomiting, or skin changes, such as rash, itching, or hives.       If you have obstructive sleep apnea  You were given anesthesia medicine during surgery to keep you comfortable and free of pain. After surgery, you may have more apnea spells because of this medicine and other medicines you were given. The spells may last longer than usual.   At home:  · Keep using the continuous positive airway pressure (CPAP) device when you sleep. Unless your health care provider tells you not to, use it when you sleep, day or night. CPAP is a common device used to  treat obstructive sleep apnea.  · Talk with your provider before taking any pain medicine, muscle relaxants, or sedatives. Your provider will tell you about the possible dangers of taking these medicines.  © 2882-2696 [employer]. [address] [place] [address], [place] [address]. All rights reserved. This information is not intended as a substitute for professional medical care. Always follow your healthcare professional's instructions.    Bone Marrow Aspiration and Biopsy  Does this test have other names?  Bone marrow exam  What is this test?  This is a two-part test that looks at the blood cells in a sample of bone marrow, the spongy tissue within certain bones. This test may help your healthcare provider diagnose or monitor a blood disease or health condition affecting your marrow.  Your bone marrow has a liquid part and a solid part. Aspiration uses a needle to remove a sample of the liquid part of bone marrow. Biopsy uses a larger needle to remove a small amount of bone with its marrow.  Part of the job of bone marrow is to make blood cells. This test can find out how well your bone marrow is working. This test is also done to find some types of cancer.  Why do I need this test?  You might have this test if your healthcare provider wants to find out the health of your bone marrow or to check on how well your marrow is making blood cells.  You may have an aspiration to check for:  · The health of your bone marrow for a transplant  · Acute leukemia  · Multiple myeloma  In some cases, bone marrow aspiration is used to confirm chromosome disorders in newborns.  You may have an aspiration followed by a biopsy if you could have:  · Bacterial, fungal, or parasitic infection  · Unexplained anemia, leucopenia, or thrombocytopenia  · Metastatic cancer or many other diseases  What other tests might I have along with this test?  Your healthcare provider may also order these tests:  · Complete blood count, or  CBC  · Reticulocyte count to find out your red blood cell survival rate  What do my test results mean?  Many things may affect your lab test results. These include the method each lab uses to do the test. Even if your test results are different from the normal value, you may not have a problem. To learn what the results mean for you, talk with your healthcare provider.  The lab will look at different aspects of your bone marrow to help find certain diseases or conditions. These aspects include:  · Type and number of blood cells  · Any abnormalities in the size, shape, or look of cells  · Level of iron in the bone marrow  · Abnormal amount of young white blood cells, called blasts  · Any chromosomal abnormalities  Depending on what is seen, your results may mean you have an infection, a blood disease, leukemia, or cancer that has spread to the bone marrow from another site.  Your healthcare provider will take your results and combine this information with information from your physical exam, health history, and other types of tests to make a diagnosis.   If your results are negative, your provider may order other tests to diagnose your condition.   How is this test done?  These tests require a sample of bone marrow. A number of sites on your body can be used for marrow aspiration, but the hip bone is a common spot. You will likely lie on your side or stomach on an exam table. Your healthcare provider will numb the area of the test. You may feel a slight prick from the needle that the provider uses to give the numbing agent.  Does this test pose any risks?  It's not possible to numb the bone, so you may feel slight pain during the procedure. But you shouldn't feel any pain afterward. Risks from a bone marrow test are rare, but you could have bleeding or an infection.  What might affect my test results?  Other factors aren't likely to affect your results.  How do I get ready for this test?  Tell your healthcare  provider if you take aspirin or have any allergies. Also tell your provider if you are pregnant, take any blood-thinner medicines, or have a history of bleeding problems.  Be sure your provider knows about all other medicines, herbs, vitamins, and supplements you are taking. This includes medicines that don't need a prescription and any illicit drugs you may use.     © 4266-6953 [employer]. [address] [place] [address], [place] [address]. All rights reserved. This information is not intended as a substitute for professional medical care. Always follow your healthcare professional's instructions.

## 2017-10-25 NOTE — OR NURSING
Pt transferred from Radiology to Phase 2 discharge area until 1400.  Pt denies any pain or discomfort.  Right Iliac area with bandaid dressing = D&I.  Pt tolerated po intake with no n/v. Pt ambulated with assistance to the restroom and voided x1 prior to discharge home with no problems noted.  VSS.  Discharge instructions reviewed with the patient and son - verbalize understanding.  Ordered Lab work done prior to discharge home.

## 2017-10-25 NOTE — NURSING
Labs reviewed with Dr. Urbina, pt s/p bone marrow aspiration and biopsy, bandaid to right posterior iliac crest, pt received fentanyl 25 mcg IV, versed 1 mg IV. Pt tolerated procedure well,  jan called to Kathy SLATER  Total sedation time 12 minutes

## 2017-10-26 LAB
BODY SITE - BONE MARROW: NORMAL
BONE MARROW WRIGHT STAIN COMMENT: NORMAL
CLINICAL DIAGNOSIS - BONE MARROW: NORMAL
FLOW CYTOMETRY ANTIBODIES ANALYZED - BONE MARROW: NORMAL
FLOW CYTOMETRY COMMENT - BONE MARROW: NORMAL
FLOW CYTOMETRY INTERPRETATION - BONE MARROW: NORMAL

## 2017-10-27 LAB
BONE MARROW IRON STAIN COMMENT: NORMAL
JAK2 P.V617F BLD/T QL: NORMAL
JAK2 V617F MUTATION DETECTION, BM RESULT: NORMAL

## 2017-11-01 LAB
CHROM BANDING METHOD: NORMAL
CHROMOSOME ANALYSIS BM ADDITIONAL INFORMATION: NORMAL
CHROMOSOME ANALYSIS BM RELEASED BY: NORMAL
CHROMOSOME ANALYSIS BM RESULT SUMMARY: NORMAL
CLINICAL CYTOGENETICIST REVIEW: NORMAL
KARYOTYP MAR: NORMAL
REASON FOR REFERRAL (NARRATIVE): NORMAL
REF LAB TEST METHOD: NORMAL
SPECIMEN SOURCE: NORMAL
SPECIMEN: NORMAL

## 2017-11-14 ENCOUNTER — OFFICE VISIT (OUTPATIENT)
Dept: HEMATOLOGY/ONCOLOGY | Facility: CLINIC | Age: 62
End: 2017-11-14
Payer: COMMERCIAL

## 2017-11-14 VITALS — HEIGHT: 61 IN | RESPIRATION RATE: 18 BRPM | BODY MASS INDEX: 21.03 KG/M2 | WEIGHT: 111.38 LBS

## 2017-11-14 DIAGNOSIS — Z87.898 HISTORY OF SYNCOPE: ICD-10-CM

## 2017-11-14 DIAGNOSIS — D75.1 ERYTHROCYTOSIS: ICD-10-CM

## 2017-11-14 DIAGNOSIS — I10 ESSENTIAL HYPERTENSION: ICD-10-CM

## 2017-11-14 DIAGNOSIS — E53.8 B12 DEFICIENCY: Primary | ICD-10-CM

## 2017-11-14 PROCEDURE — 99214 OFFICE O/P EST MOD 30 MIN: CPT | Mod: S$GLB,,, | Performed by: INTERNAL MEDICINE

## 2017-11-14 NOTE — PROGRESS NOTES
ASSESSMENT / PLAN    B12 deficiency  -     CBC auto differential; Future; Expected date: 11/14/2017  Start B12 replacement  Recent Erythrocytosis  History TIA-like symptoms with negative inherited thrombophia  Hypertension  History of syncope        B12 deficiency with history of erythrocytosis  Suspect a component of dehydration  Add B12 watch counts closely  Check counts on Thursday then recheck in 3-4 weeks for response to hydration and B12  Chorea like movements could be due to B12 deficiency  She is not anemia  Explained factor VIII elevation as well as elevation in fib heart disease  Patient must abstain from all tobacco products  She should abstain from alcohol use as well since this can lead to dehydration  Patients usually are encouraged to start a baby chewable aspirin daily to help with such  Workup negative for primary p vera with normal marrow on biopsy ( aspirate specimen not adequate )   Repeat cbc in one month then OV again for review  Continue losartan 2 Regulate her pressure.  Continue potassium while on a diuretic    Instructed her on proper hydration  Subjective:       Patient ID: Lisbeth Seaman is a 62 y.o. female.    Chief Complaint: I feel hot  HPI  Patient over follow-up of elevated red blood cells with a history of TIA-like symptoms.  Workup thus far revealed no evidence of inherited thrombophilia.  Her fibrinogen is elevated along with factor VIII level.  EPO level was decreased.  Methylmalonic acid level was increased    Here for bone marrow results    FINAL PATHOLOGIC DIAGNOSIS  BONE MARROW, RIGHT ILIAC CREST, ASPIRATE AND CORE BIOPSY:  --Normocellular marrow for age, 30-60%, with trilineage hematopoiesis, see comment  --No increase in blasts  --Adequate to mildly increased megakaryocytes  --Increased stainable iron  --No significant reticulin fibrosis  COMMENT: Concomitantly submitted flow cytometric analysis detects no diagnostic abnormal hematopoietic  population. T cells are  polyclonal with a subset of hematogones detected , and T cells are immunophenotypically    The patient has a reported history of erythrocytosis, although the current CBC is within normal limits. There is no  definitive morphologic evidence of a myeloproliferative process. Clinical correlation is required as well as  correlation with any available cytogenetic and molecular studies.  Patient is tolerating losartan for hypertension and is on potassium replacement due to use of diuretic  All medications and past history have been reviewed.    Patient had ultrasound of abdomen which  Is as follows  US Abdomen Complete 10/25/2017 None Specified          RESULTS:  Sagittal and transverse images were obtained throughout the abdomen.       The liver is of normal size contour and echogenicity without focal defect.  The gallbladder is of normal size and contour without internal echogenic structure consistent with stone.  The common bile duct is not dilated at 4 millimeters.     The pancreas is of normal contour and echogenicity without focal mass.  The spleen is of normal contour and echogenicity and measures 8 cm.     The right kidney measures 9.4 centimeters without mass cyst or hydronephrosis.  The left kidney measures 8.9 centimeters without mass cyst or hydronephrosis.  The abdominal aorta and inferior vena cava are not enlarged.  IMPRESSION:    Negative abdomen ultrasound.         Current Outpatient Prescriptions:     atorvastatin (LIPITOR) 40 MG tablet, , Disp: , Rfl:     losartan-hydrochlorothiazide 100-25 mg (HYZAAR) 100-25 mg per tablet, , Disp: , Rfl:     potassium chloride (K-TAB) 20 mEq, , Disp: , Rfl:      Ref Range & Units 2wk ago   Fibrinogen 182 - 366 mg/dL 474     Resulting Agency  [facility]     Ref Range & Units 2wk ago    Factor VIII Activity 60 - 170 % 215     Resulting Agency  [facility]     Ref Range & Units 2wk ago    Erythropoietin 2.6 - 18.5 mIU/mL 2.1       Factor V L negative  Neg prothrombin 07091U   Ref  "Range & Units 2wk ago   Methlymalonic Acid <0.40 umol/L 1.25                  Review of Systems      GENERAL: No fever, no chills , no unexpected change in weight  HEENT: No photophobia, no rhinorrhea, no sinus congestion, no tinnitus, no gingival bleeding, no oral ulcers, no sore throat  RESPIRATORY: No shortness of breath, no cough, no wheezing  CARDIOVASCULAR: No chest pain, no palpitations, no swelling of the lower extremities  GI: No abdominal pain, no dysphagia, no emesis, no diarrhea, no constipation, no heartburn,no abdominal distention, no melena, no hematochezia  : No urinary frequency,no hesitancy, no dysuria, no hematuria  RHEUM: No Arthralgias,no joint swelling  MUSCOLSKELETAL: No neck pain, no back pain, no weakness  NEUROLOGICAL: No headaches, no dizziness, no paresthesias, no change in memory  PSYCH: No agitation, no change in behavior, no anxiety, no depression  ENDOCRINE: No hot,no cold intolerance    Objective:      Resp 18   Ht 5' 1" (1.549 m)   Wt 50.5 kg (111 lb 6.4 oz)   BMI 21.05 kg/m²     Physical Exam    Gen: NAD, A and O x3,   Psych: pleasant affect, normal thought process  Eyes: Pupils round and non dilated, EOM intact  Nose: Nares patent  OP clear, mucosa patent  Neck: suppple, no JVD, trachea midline, no palpable mass, no adenopathy  Lungs: CTAB, no wheezes, no use of accessory muscles  CV: S1S2 with RRR, No mrg  Abd: soft, NTND, + BS, No HSM, no ascites  Extr: No CCE, AD, strength normal, good capillary refill  Neuro: steady gait, CNs grossly intact  Skin: intact, no lesions noted  Rheum: No joint swelling  All lab results and imaging results have been reviewed and discussed with the patient    Lab Results   Component Value Date    WBC 5.70 10/25/2017    HGB 14.8 10/25/2017    HCT 43.1 10/25/2017    MCV 89 10/25/2017     10/25/2017     CMP  Carbon Monoxide, Blood   Date Value Ref Range Status   10/25/2017 3 % Final     Comment:     -------------------REFERENCE " VALUE--------------------------  0-2 Normal (Non-smoker) , < or = 9 Normal (Smoker), > or   = 20 (Toxic concentration)  Test Performed by:  [facility] - [address] [address]         Please note A/P at top of note    The plan was discussed with the patient and all questions/concerns have been answered to the patient's satisfaction.    Thank you for allowing me to participate in this pleasant patient's care. Please call with any questions or concerns.

## 2017-12-12 ENCOUNTER — OFFICE VISIT (OUTPATIENT)
Dept: HEMATOLOGY/ONCOLOGY | Facility: CLINIC | Age: 62
End: 2017-12-12
Payer: COMMERCIAL

## 2017-12-12 VITALS
HEART RATE: 83 BPM | SYSTOLIC BLOOD PRESSURE: 112 MMHG | DIASTOLIC BLOOD PRESSURE: 83 MMHG | HEIGHT: 61 IN | WEIGHT: 109 LBS | BODY MASS INDEX: 20.58 KG/M2

## 2017-12-12 DIAGNOSIS — Z86.73 HISTORY OF TRANSIENT ISCHEMIC ATTACK (TIA): ICD-10-CM

## 2017-12-12 DIAGNOSIS — D51.3 OTHER DIETARY VITAMIN B12 DEFICIENCY ANEMIA: Primary | ICD-10-CM

## 2017-12-12 DIAGNOSIS — I10 ESSENTIAL HYPERTENSION: ICD-10-CM

## 2017-12-12 PROCEDURE — 99214 OFFICE O/P EST MOD 30 MIN: CPT | Mod: S$GLB,,, | Performed by: INTERNAL MEDICINE

## 2017-12-12 NOTE — LETTER
December 12, 2017        Tamara Roldan MD  [address] [place] [address]             [facility]  [address] [place] [address]  Phone: [phone]   Patient: Lisbeth Seaman   MR Number: [id number]   YOB: 1955   Date of Visit: 12/12/2017       Dear Dr. Roldan:    Thank you for referring Lisbeth Seaman to me for evaluation. Attached you will find relevant portions of my assessment and plan of care.    If you have questions, please do not hesitate to call me. I look forward to following Lisbeth Seaman along with you.    Sincerely,      Sarah Thorne MD            CC  No Recipients    Enclosure

## 2017-12-12 NOTE — PROGRESS NOTES
ASSESSMENT / PLAN    B12 deficiency  -     CBC auto differential; and MMA  Increase B12 to 1000mcg daily  If no response she may need B12 injections    Recent Erythrocytosis  Resolved with hydration  History TIA-like symptoms with negative inherited thrombophia  Hypertension normalized with meds  History of syncope      NEW onset of anemia  Explained factor VIII elevation as well as elevation in fib heart disease  Patient must continue to abstain from all tobacco products  Cont baby aspirin daily   Repeat cbc in one month then OV again for review  Continue losartan 2 Regulate her pressure.  Continue potassium while on a diuretic      Subjective:       Patient ID: Lisbeth Seaman is a 62 y.o. female.    Chief Complaint: I feel better  HPI  Patient over follow-up of elevated red blood cells with a history of TIA-like symptoms.  Workup thus far revealed no evidence of inherited thrombophilia.  Her fibrinogen is elevated along with factor VIII level.  EPO level was decreased.  Methylmalonic acid level was increased    Hydrating with water  Started B12 unsure of dose and is not taking daily    FINAL PATHOLOGIC DIAGNOSIS  BONE MARROW, RIGHT ILIAC CREST, ASPIRATE AND CORE BIOPSY:  --Normocellular marrow for age, 30-60%, with trilineage hematopoiesis, see comment  --No increase in blasts  --Adequate to mildly increased megakaryocytes  --Increased stainable iron  --No significant reticulin fibrosis  COMMENT: Concomitantly submitted flow cytometric analysis detects no diagnostic abnormal hematopoietic  population. T cells are polyclonal with a subset of hematogones detected , and T cells are immunophenotypically    The patient has a reported history of erythrocytosis, although the current CBC is within normal limits. There is no  definitive morphologic evidence of a myeloproliferative process. Clinical correlation is required as well as  correlation with any available cytogenetic and molecular studies.  Patient is  "tolerating losartan for hypertension and is on potassium replacement due to use of diuretic  All medications and past history have been reviewed.        Current Outpatient Prescriptions:     atorvastatin (LIPITOR) 40 MG tablet, , Disp: , Rfl:     losartan-hydrochlorothiazide 100-25 mg (HYZAAR) 100-25 mg per tablet, , Disp: , Rfl:     potassium chloride (K-TAB) 20 mEq, , Disp: , Rfl:      Ref Range & Units 2wk ago   Fibrinogen 182 - 366 mg/dL 474     Resulting Agency  [facility]     Ref Range & Units 2wk ago    Factor VIII Activity 60 - 170 % 215     Resulting Agency  [facility]     Ref Range & Units 2wk ago    Erythropoietin 2.6 - 18.5 mIU/mL 2.1       Factor V L negative  Neg prothrombin [id number]   Ref Range & Units 2wk ago   Methlymalonic Acid <0.40 umol/L 1.25                  Review of Systems      GENERAL: No fever, no chills , no unexpected change in weight  HEENT: No photophobia, no rhinorrhea, no sinus congestion, no tinnitus, no gingival bleeding, no oral ulcers, no sore throat  RESPIRATORY: No shortness of breath, no cough, no wheezing  CARDIOVASCULAR: No chest pain, no palpitations, no swelling of the lower extremities  GI: No abdominal pain, no dysphagia, no emesis, no diarrhea, no constipation, no heartburn,no abdominal distention, no melena, no hematochezia  : No urinary frequency,no hesitancy, no dysuria, no hematuria  RHEUM: No Arthralgias,no joint swelling  MUSCOLSKELETAL: No neck pain, no back pain, no weakness  NEUROLOGICAL: No headaches, no dizziness, no paresthesias, no change in memory  PSYCH: No agitation, no change in behavior, no anxiety, no depression  ENDOCRINE: No hot,no cold intolerance    Objective:      /83   Pulse 83   Ht 5' 1" (1.549 m)   Wt 49.4 kg (109 lb)   BMI 20.60 kg/m²     Physical Exam    Gen: NAD, A and O x3,   Psych: pleasant affect, normal thought process  Eyes: Pupils round and non dilated, EOM intact  Nose: Nares patent  OP clear, mucosa patent  Neck: suppple, no JVD, " trachea midline, no palpable mass, no adenopathy  Lungs: CTAB, no wheezes, no use of accessory muscles  CV: S1S2 with RRR, No mrg  Abd: soft, NTND, + BS, No HSM, no ascites  Extr: No CCE, AD, strength normal, good capillary refill  Neuro: steady gait, CNs grossly intact  Skin: intact, no lesions noted  Rheum: No joint swelling  All lab results and imaging results have been reviewed and discussed with the patient    Lab Results   Component Value Date    WBC 5.70 10/25/2017    HGB 14.8 10/25/2017    HCT 43.1 10/25/2017    MCV 89 10/25/2017     10/25/2017       Hb was 16.9 in September  Now 11.5      CMP      Please note A/P at top of note    The plan was discussed with the patient and all questions/concerns have been answered to the patient's satisfaction.    Thank you for allowing me to participate in this pleasant patient's care. Please call with any questions or concerns.

## 2018-01-08 ENCOUNTER — TELEPHONE (OUTPATIENT)
Dept: HEMATOLOGY/ONCOLOGY | Facility: CLINIC | Age: 63
End: 2018-01-08

## 2018-01-08 NOTE — TELEPHONE ENCOUNTER
----- Message from Anny Olguin sent at 1/8/2018  2:56 PM CST -----  Please call son/Scooter to reschedule patients appointment.  Call [phone].

## 2018-01-09 ENCOUNTER — TELEPHONE (OUTPATIENT)
Dept: HEMATOLOGY/ONCOLOGY | Facility: CLINIC | Age: 63
End: 2018-01-09

## 2018-01-09 ENCOUNTER — HOSPITAL ENCOUNTER (EMERGENCY)
Facility: HOSPITAL | Age: 63
Discharge: LEFT BEFORE DISCHARGE | End: 2018-01-09
Payer: COMMERCIAL

## 2018-01-09 VITALS
OXYGEN SATURATION: 98 % | HEART RATE: 92 BPM | WEIGHT: 109 LBS | TEMPERATURE: 98 F | BODY MASS INDEX: 20.58 KG/M2 | SYSTOLIC BLOOD PRESSURE: 146 MMHG | DIASTOLIC BLOOD PRESSURE: 85 MMHG | HEIGHT: 61 IN | RESPIRATION RATE: 18 BRPM

## 2018-01-09 DIAGNOSIS — I10 HYPERTENSION, UNSPECIFIED TYPE: Primary | ICD-10-CM

## 2018-01-09 LAB
ALBUMIN SERPL BCP-MCNC: 4.3 G/DL
ALP SERPL-CCNC: 95 U/L
ALT SERPL W/O P-5'-P-CCNC: 7 U/L
ANION GAP SERPL CALC-SCNC: 11 MMOL/L
AST SERPL-CCNC: 20 U/L
BASOPHILS NFR BLD: 0 %
BILIRUB SERPL-MCNC: 0.9 MG/DL
BUN SERPL-MCNC: 35 MG/DL
CALCIUM SERPL-MCNC: 10.5 MG/DL
CHLORIDE SERPL-SCNC: 100 MMOL/L
CO2 SERPL-SCNC: 29 MMOL/L
CREAT SERPL-MCNC: 1.4 MG/DL
DIFFERENTIAL METHOD: ABNORMAL
EOSINOPHIL NFR BLD: 1 %
ERYTHROCYTE [DISTWIDTH] IN BLOOD BY AUTOMATED COUNT: 14.1 %
EST. GFR  (AFRICAN AMERICAN): 47 ML/MIN/1.73 M^2
EST. GFR  (NON AFRICAN AMERICAN): 41 ML/MIN/1.73 M^2
GLUCOSE SERPL-MCNC: 93 MG/DL
HCT VFR BLD AUTO: 33.3 %
HGB BLD-MCNC: 11.4 G/DL
LYMPHOCYTES NFR BLD: 17 %
MCH RBC QN AUTO: 30.6 PG
MCHC RBC AUTO-ENTMCNC: 34.2 G/DL
MCV RBC AUTO: 89 FL
MONOCYTES NFR BLD: 3 %
NEUTROPHILS NFR BLD: 77 %
NEUTS BAND NFR BLD MANUAL: 2 %
PLATELET # BLD AUTO: 167 K/UL
PLATELET BLD QL SMEAR: ABNORMAL
PMV BLD AUTO: 8.8 FL
POTASSIUM SERPL-SCNC: 4.2 MMOL/L
PROT SERPL-MCNC: 8.3 G/DL
RBC # BLD AUTO: 3.73 M/UL
SODIUM SERPL-SCNC: 140 MMOL/L
WBC # BLD AUTO: 5 K/UL

## 2018-01-09 PROCEDURE — 80053 COMPREHEN METABOLIC PANEL: CPT

## 2018-01-09 PROCEDURE — 85027 COMPLETE CBC AUTOMATED: CPT

## 2018-01-09 PROCEDURE — 85007 BL SMEAR W/DIFF WBC COUNT: CPT

## 2018-01-09 PROCEDURE — 36415 COLL VENOUS BLD VENIPUNCTURE: CPT

## 2018-01-09 PROCEDURE — 99283 EMERGENCY DEPT VISIT LOW MDM: CPT

## 2018-01-09 NOTE — ED PROVIDER NOTES
"Encounter Date: 1/9/2018       History     Chief Complaint   Patient presents with    Hypertension     son states " passed out at [employer] last week "     HPI  Review of patient's allergies indicates:  No Known Allergies  Past Medical History:   Diagnosis Date    Hyperlipidemia     Hypertension     Stroke      History reviewed. No pertinent surgical history.  History reviewed. No pertinent family history.  Social History   Substance Use Topics    Smoking status: Former Smoker    Smokeless tobacco: Not on file    Alcohol use 8.4 oz/week     14 Shots of liquor per week     Review of Systems    Physical Exam     Initial Vitals [01/09/18 1336]   BP Pulse Resp Temp SpO2   (!) 146/85 92 18 98 °F (36.7 °C) 98 %      MAP       105.33         Physical Exam    ED Course   Procedures  Labs Reviewed   CBC W/ AUTO DIFFERENTIAL - Abnormal; Notable for the following:        Result Value    RBC 3.73 (*)     Hemoglobin 11.4 (*)     Hematocrit 33.3 (*)     MPV 8.8 (*)     Gran% 77.0 (*)     Lymph% 17.0 (*)     Mono% 3.0 (*)     Platelet Estimate Clumped (*)     All other components within normal limits   COMPREHENSIVE METABOLIC PANEL - Abnormal; Notable for the following:     BUN, Bld 35 (*)     ALT 7 (*)     eGFR if  47 (*)     eGFR if non  41 (*)     All other components within normal limits                Additional MDM:   Comments: Patient denied any complaints. Her son reports she has been "blacking out" and her blood pressure has "been up and been down". History of prior TIA's. Patient left after initial medical exam prior to room placement. .                 ED Course      Clinical Impression:   The encounter diagnosis was Hypertension, unspecified type.            Got up and left ER, no MD exam performed.               Cecile Enamorado PA-C  01/10/18 1526       Leon Tuttle MD  01/17/18 1640    "

## 2018-01-09 NOTE — TELEPHONE ENCOUNTER
----- Message from [place]o sent at 1/9/2018 12:42 PM CST -----  Contact: Patient's son, Scooter  Patient's son is calling because he is stating that his mom has been out of it for several days now, and her blood pressure has been up and down and really high and really low, and was trying to see if she should come in for her appointment today or if he should bring her to the hospital instead.  Call Back#[phone]  Thanks

## 2018-02-25 ENCOUNTER — HOSPITAL ENCOUNTER (INPATIENT)
Facility: HOSPITAL | Age: 63
LOS: 6 days | Discharge: HOME HEALTH | DRG: 071 | End: 2018-03-03
Attending: HOSPITALIST | Admitting: HOSPITALIST
Payer: COMMERCIAL

## 2018-02-25 DIAGNOSIS — G91.9 HYDROCEPHALUS: ICD-10-CM

## 2018-02-25 DIAGNOSIS — G91.2 NPH (NORMAL PRESSURE HYDROCEPHALUS): Primary | ICD-10-CM

## 2018-02-25 PROBLEM — I45.10 RIGHT BUNDLE BRANCH BLOCK: Status: ACTIVE | Noted: 2018-02-25

## 2018-02-25 PROBLEM — F03.90 DEMENTIA WITHOUT BEHAVIORAL DISTURBANCE: Status: ACTIVE | Noted: 2018-02-25

## 2018-02-25 PROBLEM — I10 HYPERTENSION, ESSENTIAL: Status: ACTIVE | Noted: 2018-02-25

## 2018-02-25 PROBLEM — E87.6 HYPOKALEMIA: Status: ACTIVE | Noted: 2018-02-25

## 2018-02-25 PROBLEM — G93.41 ENCEPHALOPATHY, METABOLIC: Chronic | Status: ACTIVE | Noted: 2018-02-25

## 2018-02-25 PROBLEM — D64.9 ANEMIA: Status: ACTIVE | Noted: 2018-02-25

## 2018-02-25 PROBLEM — N18.30 CKD (CHRONIC KIDNEY DISEASE), STAGE III: Status: ACTIVE | Noted: 2018-02-25

## 2018-02-25 PROBLEM — E83.39 HYPOPHOSPHATEMIA: Status: ACTIVE | Noted: 2018-02-25

## 2018-02-25 PROBLEM — K92.2 GIB (GASTROINTESTINAL BLEEDING): Status: ACTIVE | Noted: 2018-02-25

## 2018-02-25 PROBLEM — F48.2 PSEUDOBULBAR AFFECT: Status: ACTIVE | Noted: 2018-02-25

## 2018-02-25 PROBLEM — F01.50 VASCULAR DEMENTIA WITHOUT BEHAVIORAL DISTURBANCE: Status: ACTIVE | Noted: 2018-02-25

## 2018-02-25 PROBLEM — D72.829 LEUKOCYTOSIS: Status: ACTIVE | Noted: 2018-02-25

## 2018-02-25 PROBLEM — G93.41 ENCEPHALOPATHY, METABOLIC: Status: ACTIVE | Noted: 2018-02-25

## 2018-02-25 PROBLEM — E87.0 HYPERNATREMIA: Status: ACTIVE | Noted: 2018-02-25

## 2018-02-25 LAB
ABO + RH BLD: NORMAL
ALBUMIN SERPL BCP-MCNC: 2.5 G/DL
ALP SERPL-CCNC: 84 U/L
ALT SERPL W/O P-5'-P-CCNC: 17 U/L
AMPHET+METHAMPHET UR QL: NEGATIVE
ANION GAP SERPL CALC-SCNC: 13 MMOL/L
ANION GAP SERPL CALC-SCNC: 13 MMOL/L
AST SERPL-CCNC: 27 U/L
BARBITURATES UR QL SCN>200 NG/ML: NEGATIVE
BASOPHILS # BLD AUTO: 0.01 K/UL
BASOPHILS NFR BLD: 0.1 %
BENZODIAZ UR QL SCN>200 NG/ML: NEGATIVE
BILIRUB SERPL-MCNC: 1.2 MG/DL
BILIRUB UR QL STRIP: NEGATIVE
BLD GP AB SCN CELLS X3 SERPL QL: NORMAL
BUN SERPL-MCNC: 50 MG/DL
BUN SERPL-MCNC: 62 MG/DL
BZE UR QL SCN: NEGATIVE
CALCIUM SERPL-MCNC: 9 MG/DL
CALCIUM SERPL-MCNC: 9.4 MG/DL
CANNABINOIDS UR QL SCN: NEGATIVE
CHLORIDE SERPL-SCNC: 121 MMOL/L
CHLORIDE SERPL-SCNC: 124 MMOL/L
CLARITY UR REFRACT.AUTO: ABNORMAL
CO2 SERPL-SCNC: 23 MMOL/L
CO2 SERPL-SCNC: 25 MMOL/L
COLOR UR AUTO: YELLOW
CREAT SERPL-MCNC: 1.3 MG/DL
CREAT SERPL-MCNC: 1.4 MG/DL
CREAT UR-MCNC: 115 MG/DL
DIFFERENTIAL METHOD: ABNORMAL
EOSINOPHIL # BLD AUTO: 0 K/UL
EOSINOPHIL NFR BLD: 0 %
ERYTHROCYTE [DISTWIDTH] IN BLOOD BY AUTOMATED COUNT: 14.8 %
EST. GFR  (AFRICAN AMERICAN): 46.8 ML/MIN/1.73 M^2
EST. GFR  (AFRICAN AMERICAN): 51.2 ML/MIN/1.73 M^2
EST. GFR  (NON AFRICAN AMERICAN): 40.6 ML/MIN/1.73 M^2
EST. GFR  (NON AFRICAN AMERICAN): 44.4 ML/MIN/1.73 M^2
ETHANOL UR-MCNC: <10 MG/DL
FOLATE SERPL-MCNC: 4.4 NG/ML
GLUCOSE SERPL-MCNC: 115 MG/DL
GLUCOSE SERPL-MCNC: 163 MG/DL
GLUCOSE UR QL STRIP: NEGATIVE
HCT VFR BLD AUTO: 33.6 %
HGB BLD-MCNC: 10.8 G/DL
HGB UR QL STRIP: NEGATIVE
IMM GRANULOCYTES # BLD AUTO: 0.07 K/UL
IMM GRANULOCYTES NFR BLD AUTO: 0.5 %
INR PPP: 1.1
KETONES UR QL STRIP: NEGATIVE
LEUKOCYTE ESTERASE UR QL STRIP: NEGATIVE
LYMPHOCYTES # BLD AUTO: 0.6 K/UL
LYMPHOCYTES NFR BLD: 4.1 %
MAGNESIUM SERPL-MCNC: 2.1 MG/DL
MCH RBC QN AUTO: 30.1 PG
MCHC RBC AUTO-ENTMCNC: 32.1 G/DL
MCV RBC AUTO: 94 FL
METHADONE UR QL SCN>300 NG/ML: NEGATIVE
MONOCYTES # BLD AUTO: 0.9 K/UL
MONOCYTES NFR BLD: 6.7 %
NEUTROPHILS # BLD AUTO: 11.8 K/UL
NEUTROPHILS NFR BLD: 88.6 %
NITRITE UR QL STRIP: NEGATIVE
NRBC BLD-RTO: 0 /100 WBC
OB PNL STL: POSITIVE
OPIATES UR QL SCN: NEGATIVE
OSMOLALITY SERPL: 356 MOSM/KG
OSMOLALITY UR: 704 MOSM/KG
PCP UR QL SCN>25 NG/ML: NEGATIVE
PH UR STRIP: 5 [PH] (ref 5–8)
PHOSPHATE SERPL-MCNC: 1.9 MG/DL
PLATELET # BLD AUTO: 217 K/UL
PMV BLD AUTO: 11.4 FL
POTASSIUM SERPL-SCNC: 3.4 MMOL/L
POTASSIUM SERPL-SCNC: 3.5 MMOL/L
PROCALCITONIN SERPL IA-MCNC: 0.45 NG/ML
PROT SERPL-MCNC: 6.9 G/DL
PROT UR QL STRIP: NEGATIVE
PROTHROMBIN TIME: 11 SEC
RBC # BLD AUTO: 3.59 M/UL
SODIUM SERPL-SCNC: 159 MMOL/L
SODIUM SERPL-SCNC: 160 MMOL/L
SP GR UR STRIP: 1.02 (ref 1–1.03)
T4 FREE SERPL-MCNC: 0.71 NG/DL
TOXICOLOGY INFORMATION: NORMAL
TSH SERPL DL<=0.005 MIU/L-ACNC: 0.25 UIU/ML
URN SPEC COLLECT METH UR: ABNORMAL
UROBILINOGEN UR STRIP-ACNC: 2 EU/DL
VIT B12 SERPL-MCNC: >2000 PG/ML
WBC # BLD AUTO: 13.35 K/UL

## 2018-02-25 PROCEDURE — 80307 DRUG TEST PRSMV CHEM ANLYZR: CPT

## 2018-02-25 PROCEDURE — 86592 SYPHILIS TEST NON-TREP QUAL: CPT

## 2018-02-25 PROCEDURE — 83935 ASSAY OF URINE OSMOLALITY: CPT

## 2018-02-25 PROCEDURE — 85025 COMPLETE CBC W/AUTO DIFF WBC: CPT

## 2018-02-25 PROCEDURE — 99223 1ST HOSP IP/OBS HIGH 75: CPT | Mod: ,,, | Performed by: HOSPITALIST

## 2018-02-25 PROCEDURE — 83930 ASSAY OF BLOOD OSMOLALITY: CPT

## 2018-02-25 PROCEDURE — 86901 BLOOD TYPING SEROLOGIC RH(D): CPT

## 2018-02-25 PROCEDURE — A4216 STERILE WATER/SALINE, 10 ML: HCPCS | Performed by: STUDENT IN AN ORGANIZED HEALTH CARE EDUCATION/TRAINING PROGRAM

## 2018-02-25 PROCEDURE — 87040 BLOOD CULTURE FOR BACTERIA: CPT

## 2018-02-25 PROCEDURE — 11000001 HC ACUTE MED/SURG PRIVATE ROOM

## 2018-02-25 PROCEDURE — 36415 COLL VENOUS BLD VENIPUNCTURE: CPT

## 2018-02-25 PROCEDURE — 84100 ASSAY OF PHOSPHORUS: CPT

## 2018-02-25 PROCEDURE — 85610 PROTHROMBIN TIME: CPT

## 2018-02-25 PROCEDURE — 80053 COMPREHEN METABOLIC PANEL: CPT

## 2018-02-25 PROCEDURE — 80048 BASIC METABOLIC PNL TOTAL CA: CPT

## 2018-02-25 PROCEDURE — 82746 ASSAY OF FOLIC ACID SERUM: CPT

## 2018-02-25 PROCEDURE — 83735 ASSAY OF MAGNESIUM: CPT

## 2018-02-25 PROCEDURE — 82607 VITAMIN B-12: CPT

## 2018-02-25 PROCEDURE — 25000003 PHARM REV CODE 250: Performed by: STUDENT IN AN ORGANIZED HEALTH CARE EDUCATION/TRAINING PROGRAM

## 2018-02-25 PROCEDURE — 81003 URINALYSIS AUTO W/O SCOPE: CPT

## 2018-02-25 PROCEDURE — 82272 OCCULT BLD FECES 1-3 TESTS: CPT

## 2018-02-25 PROCEDURE — 84443 ASSAY THYROID STIM HORMONE: CPT

## 2018-02-25 PROCEDURE — 84145 PROCALCITONIN (PCT): CPT

## 2018-02-25 PROCEDURE — 84439 ASSAY OF FREE THYROXINE: CPT

## 2018-02-25 RX ORDER — SODIUM CHLORIDE 0.9 % (FLUSH) 0.9 %
3 SYRINGE (ML) INJECTION EVERY 8 HOURS
Status: DISCONTINUED | OUTPATIENT
Start: 2018-02-25 | End: 2018-03-03 | Stop reason: HOSPADM

## 2018-02-25 RX ORDER — SODIUM,POTASSIUM PHOSPHATES 280-250MG
2 POWDER IN PACKET (EA) ORAL
Status: DISCONTINUED | OUTPATIENT
Start: 2018-02-25 | End: 2018-02-25

## 2018-02-25 RX ORDER — DEXTROSE MONOHYDRATE 50 MG/ML
INJECTION, SOLUTION INTRAVENOUS CONTINUOUS
Status: DISCONTINUED | OUTPATIENT
Start: 2018-02-25 | End: 2018-02-27

## 2018-02-25 RX ADMIN — POTASSIUM PHOSPHATE, MONOBASIC AND POTASSIUM PHOSPHATE, DIBASIC 20 MMOL: 224; 236 INJECTION, SOLUTION, CONCENTRATE INTRAVENOUS at 11:02

## 2018-02-25 RX ADMIN — DEXTROSE 950 ML: 5 SOLUTION INTRAVENOUS at 11:02

## 2018-02-25 RX ADMIN — Medication 3 ML: at 09:02

## 2018-02-25 NOTE — HPI
"63 M with h/o HTN, HLD, stroke 9/2017 and since then patient functional status has been declining.She is not walking at all, not eating and unable to work,developed some dementia associated with this most recent stroke. She is no longer able to manage her own finances. She also requires assistance with houshold chores like cooking and cleaning. She is able to feed, dress and bath herself. Since the stroke she has had difficulty controlling her emotions and will cry or laugh for no apparent reason.Her son how is her primary caregiver, brought her to her PCP, MRI head was done, concerning for NPH, referred to  neurosurgery which planning to do "stent" some timed this week per son. But due to sudden GI bleed yesterday 2/24 patient was brought to ED at [facility].  No significant drop on H/H, received 1L NS for pre renal ADITHYA  And transferred to [facility] for neurology/nurosergey evaluation.       "

## 2018-02-25 NOTE — ASSESSMENT & PLAN NOTE
Informed by [facility] around 5:30 am that patient had change in clinical status with moderate size BRBPR. 2/23/2018 at outside hospital.   No GI bleed reported since admission  Rectal exam not significant.   - H/H stable  - Follow hemoccult stool test  - H/H monitor  - transfuse if hgb < 7  - Consider GI  Consult if patient developed any bleed.

## 2018-02-25 NOTE — HOSPITAL COURSE
Admitted to [facility], neurology/neurosurgery consulted, will follow their recs, patient has leukocytosis, septic work up sent, H/H stable, no GI bleed since admission.   02/26 Pt able to respond to some questions and able to follow some commands. Continuing w/ IVF w/ improving hypernatremia. Will likely require SNF placement after d/c; PPD placed on 2/26 02/27 Hypernatremia resolved. Mentation improving but not yet returned to baseline. Evaluating thrombocytopenia, likely pseudothrombocytopenia  02/28 Mentation continued to improve; patient does especially well during the afternoon hours. Plan for therapeutic LP for NPH this afternoon pending coordination w/ PT therapy to evaluate patient before and after the tap. Replacing lytes and continuing IVF now w/ 1/2 NS  03/01 Pending diagnostic/ therapeutic LP w/ PT eval before and after the procedure. Pt mental status unchanged from the day prior. Obtaining PT/INR and CT head   03/02 Diagnostic/therapeutic LP w/ PT evaluation before and after scheduled w/ FL for 13:00. Iron studies ordered as well as fecal heme occult test as Hb is slowly decreasing throughout her admission stay.   03/03 Patient improved w/ LP - much brighter and conversant today. Will need to f/u w/ both NSGY and Neurology outpatient. Stable to go home w/ home health today. Iniguez in; bladder training w. H/H

## 2018-02-25 NOTE — ASSESSMENT & PLAN NOTE
Vascular dementia without behavioral disturbance  Hydrocephalus  Patient presented with decline functional status post strok 9/2017, got worse in the last 6 weeks.  CT head at outside facility showed enlarged ventricles due to hydrocephalus vs underlying dementia per radiology.   planned surgical intervention tomorroe at outside hospital per patient's son.  Currently patient disoriented to time place and situation, not following all commands  - neurosurgery consulted, will follow their recs.  - neurology consulted:   No further neurology evaluations necessary   Follow dementia work up :TSH/B12/FOLATE/RPR. Toxic screen.

## 2018-02-25 NOTE — ASSESSMENT & PLAN NOTE
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    -- No acute neurosurgical intervention necessary.  -- Recommend workup and treatment of GI bleed per primary team.  -- We will refer the patient back to her neurosurgeon for placement of VPS for NPH once medically stable as NPH may be treated on an outpatient basis.  -- Medical management per primary team.  -- No further inpatient neurosurgical needs at this time, we will sign off. Please call with questions.    Discussed with Dr. Del Rio.

## 2018-02-25 NOTE — ASSESSMENT & PLAN NOTE
Likely multifactorial: CKD, iron deficiency,decrease oral intake and acute GI loss.   - H/H monitor  - transfuse if hgb < 7  - Consider GI  Consult if patient developed any bleed.

## 2018-02-25 NOTE — ASSESSMENT & PLAN NOTE
Currently not taking any meds due to mental statue decline  Will monitor and start med if uncontrolled .

## 2018-02-25 NOTE — CONSULTS
"[facility]  Neurosurgery  Consult Note    Consults  Subjective:     Chief Complaint/Reason for Admission: NPH    History of Present Illness: Patient is a 63 year old female who is admitted to the IM service for workup of GI bleed. Patient was diagnosed with NPH by her neurosurgeon at home, who planned for  shunt placement tomorrow. She was taken to OSH by her son due to GI bleeding, there was no neurosurgery available at OSH, and she was transferred here for neurosurgical evaluation.    PER IM NOTE:    63 M with h/o HTN, HLD, stroke 9/2017 and since then patient functional status has been declining.She is not walking at all, not eating and unable to work,developed some dementia associated with this most recent stroke. She is no longer able to manage her own finances. She also requires assistance with houshold chores like cooking and cleaning. She is able to feed, dress and bath herself. Since the stroke she has had difficulty controlling her emotions and will cry or laugh for no apparent reason.Her son how is her primary caregiver, brought her to her PCP, MRI head was done, concerning for NPH, referred to  neurosurgery which planning to do "stent" some timed this week per son. But due to sudden GI bleed yesterday 2/24 patient was brought to ED at [facility].  No significant drop on H/H, received 1L NS for pre renal ADITHYA  And [facility] for neurology/nurosergey evaluation.     Prescriptions Prior to Admission   Medication Sig Dispense Refill Last Dose    atorvastatin (LIPITOR) 40 MG tablet    Taking    losartan-hydrochlorothiazide 100-25 mg (HYZAAR) 100-25 mg per tablet    Taking    potassium chloride (K-TAB) 20 mEq    Taking       Review of patient's allergies indicates:  No Known Allergies    Past Medical History:   Diagnosis Date    Hyperlipidemia     Hypertension     Stroke 09/2017    residual deficits are ambulates on her own with a limp, but has " limited use of her right arm; emotional lability    Vascular dementia     since 9/2017 stroke     History reviewed. No pertinent surgical history.  Family History     None        Social History Main Topics    Smoking status: Former Smoker    Smokeless tobacco: Not on file    Alcohol use 8.4 oz/week     14 Shots of liquor per week    Drug use: No    Sexual activity: Not on file     Review of Systems   Unable to perform ROS: Dementia     Objective:     Weight: 40 kg (88 lb 2.9 oz)  Body mass index is 15.14 kg/m².  Vital Signs (Most Recent):  Temp: 98.8 °F (37.1 °C) (02/25/18 1128)  Pulse: 89 (02/25/18 1128)  Resp: 16 (02/25/18 1128)  BP: (!) 117/57 (02/25/18 1128)  SpO2: 100 % (02/25/18 1128) Vital Signs (24h Range):  Temp:  [98.1 °F (36.7 °C)-98.8 °F (37.1 °C)] 98.8 °F (37.1 °C)  Pulse:  [89-94] 89  Resp:  [16] 16  SpO2:  [95 %-100 %] 100 %  BP: (117-156)/(57-72) 117/57     Neurosurgery Physical Exam    Awake, alert, oriented to person only.  PERRL, EOMI, FS.  Not following commands. TREVINO spontaneously and symm.   No respiratory distress.    Significant Labs:    Recent Labs  Lab 02/25/18  0830   *   *   K 3.4*   *   CO2 23   BUN 62*   CREATININE 1.4   CALCIUM 9.4   MG 2.1       Recent Labs  Lab 02/25/18  0830   WBC 13.35*   HGB 10.8*   HCT 33.6*          Recent Labs  Lab 02/25/18  1004   INR 1.1     Microbiology Results (last 7 days)     Procedure Component Value Units Date/Time    Blood culture [[id number]] Collected:  02/25/18 1004    Order Status:  Sent Specimen:  Blood Updated:  02/25/18 1035    Blood Culture #2 **CANNOT BE ORDERED STAT** [[id number]] Collected:  02/25/18 1005    Order Status:  Sent Specimen:  Blood Updated:  02/25/18 1035        All pertinent labs from the last 24 hours have been reviewed.    Significant Diagnostics:  I have reviewed all pertinent imaging results/findings within the past 24 hours.    Assessment/Plan:     NPH (normal pressure hydrocephalus)    63F  with NPH diagnosed by outside neurosurgeon with planned  shunt placement tomorrow, admitted to medicine with GI bleed.    -- No acute neurosurgical intervention necessary.  -- Recommend workup and treatment of GI bleed per primary team.  -- We will refer the patient back to her neurosurgeon for placement of VPS for NPH once medically stable as NPH may be treated on an outpatient basis.  -- Medical management per primary team.  -- No further inpatient neurosurgical needs at this time, we will sign off. Please call with questions.    Discussed with Dr. Del Rio.            Thank you for your consult. I will sign off. Please contact us if you have any additional questions.    Varun Martin MD  Neurosurgery  [facility]

## 2018-02-25 NOTE — H&P
"[facility] Hospital Medicine  History & Physical    Patient Name: Lisbeth Seaman  MRN: [id number]  Admission Date: 2/25/2018  Attending Physician: Ezekiel Boyer MD   Primary Care Provider: Tamara Roldan MD    [facility] Medicine Team: [facility] HOSP MED 1 ROSA Ferguson     Patient information was obtained from relative(s) and past medical records.     Subjective:     Principal Problem:Encephalopathy, metabolic    Chief Complaint:   Chief Complaint   Patient presents with    Altered Mental Status     for 6 weeks         HPI: 63 M with h/o HTN, HLD, stroke 9/2017 and since then patient functional status has been declining.She is not walking at all, not eating and unable to work,developed some dementia associated with this most recent stroke. She is no longer able to manage her own finances. She also requires assistance with houshold chores like cooking and cleaning. She is able to feed, dress and bath herself. Since the stroke she has had difficulty controlling her emotions and will cry or laugh for no apparent reason.Her son how is her primary caregiver, brought her to her PCP, MRI head was done, concerning for NPH, referred to  neurosurgery which planning to do "stent" some timed this week per son. But due to sudden GI bleed yesterday 2/24 patient was brought to ED at [facility].  No significant drop on H/H, received 1L NS for pre renal ADITHYA  And transferred [facility] for neurology/nurosergey evaluation.         Past Medical History:   Diagnosis Date    Hyperlipidemia     Hypertension     Stroke 09/2017    residual deficits are ambulates on her own with a limp, but has limited use of her right arm; emotional lability    Vascular dementia     since 9/2017 stroke       History reviewed. No pertinent surgical history.    Review of patient's allergies indicates:  No Known Allergies    No current facility-administered medications on file prior to encounter.  "     Current Outpatient Prescriptions on File Prior to Encounter   Medication Sig    atorvastatin (LIPITOR) 40 MG tablet     losartan-hydrochlorothiazide 100-25 mg (HYZAAR) 100-25 mg per tablet     potassium chloride (K-TAB) 20 mEq      Family History     None        Social History Main Topics    Smoking status: Former Smoker    Smokeless tobacco: Not on file    Alcohol use 8.4 oz/week     14 Shots of liquor per week    Drug use: No    Sexual activity: Not on file     Review of Systems   Unable to perform ROS: Dementia     Objective:     Vital Signs (Most Recent):  Temp: 98.8 °F (37.1 °C) (02/25/18 1128)  Pulse: 89 (02/25/18 1128)  Resp: 16 (02/25/18 1128)  BP: (!) 117/57 (02/25/18 1128)  SpO2: 100 % (02/25/18 1128) Vital Signs (24h Range):  Temp:  [98.1 °F (36.7 °C)-98.8 °F (37.1 °C)] 98.8 °F (37.1 °C)  Pulse:  [89-94] 89  Resp:  [16] 16  SpO2:  [95 %-100 %] 100 %  BP: (117-156)/(57-72) 117/57     Weight: 40 kg (88 lb 2.9 oz)  Body mass index is 15.14 kg/m².    Physical Exam   Constitutional: No distress.   HENT:   Head: Normocephalic and atraumatic.   Eyes: Pupils are equal, round, and reactive to light. No scleral icterus.   Cardiovascular: Normal rate, regular rhythm and normal heart sounds.    No murmur heard.  Pulmonary/Chest: No respiratory distress.   Abdominal: Soft. Bowel sounds are normal. She exhibits no distension. There is no tenderness.   Rectal exam : no external hemorrhoids, fissures , normal rectal tone, stool at rectal vault. No blood    Musculoskeletal: She exhibits no edema, tenderness or deformity.   Lymphadenopathy:     She has no cervical adenopathy.   Neurological: She is alert.   disoriented to time, place and situation    Skin: She is not diaphoretic. No pallor.   Vitals reviewed.        CRANIAL NERVES     CN III, IV, VI   Pupils are equal, round, and reactive to light.       Significant Labs:   CBC:   Recent Labs  Lab 02/25/18  0830   WBC 13.35*   HGB 10.8*   HCT 33.6*         CMP:   Recent Labs  Lab 02/25/18  0830   *   K 3.4*   *   CO2 23   *   BUN 62*   CREATININE 1.4   CALCIUM 9.4   PROT 6.9   ALBUMIN 2.5*   BILITOT 1.2*   ALKPHOS 84   AST 27   ALT 17   ANIONGAP 13   EGFRNONAA 40.6*       Significant Imaging: non    Assessment/Plan:     * Encephalopathy, metabolic    Vascular dementia without behavioral disturbance  Hydrocephalus  Patient presented with decline functional status post strok 9/2017, got worse in the last 6 weeks.  CT head at outside facility showed enlarged ventricles due to hydrocephalus vs underlying dementia per radiology.   planned surgical intervention tomorroe at outside hospital per patient's son.  Currently patient disoriented to time place and situation, not following all commands  - neurosurgery consulted, will follow their recs.  - neurology consulted:   No further neurology evaluations necessary   Follow dementia work up :TSH/B12/FOLATE/RPR. Toxic screen.              Leukocytosis    Patient afebrile and vitally stable   Septic work up sent, follow up          Hypophosphatemia       Will replace as needed.         Hypokalemia     Will replace as needed.         Hypernatremia    -started on D5W at 75m/h   - monitor BMP Q6 HRs           Hydrocephalus              GIB (gastrointestinal bleeding)    Informed by [facility] around 5:30 am that patient had change in clinical status with moderate size BRBPR. 2/23/2018 at outside hospital.   No GI bleed reported since admission  Rectal exam not significant.   - H/H stable  - Follow hemoccult stool test  - H/H monitor  - transfuse if hgb < 7  - Consider GI  Consult if patient developed any bleed.           Hypertension, essential    Currently not taking any meds due to mental statue decline  Will monitor and start med if uncontrolled .         Anemia    Likely multifactorial: CKD, iron deficiency,decrease oral intake and acute GI loss.   - H/H monitor  - transfuse if hgb < 7  - Consider GI  Consult if  patient developed any bleed.           CKD (chronic kidney disease), stage III    - Avoid renal toxic meds  - Renally dose meds.          Hyperlipidemia    Currently not taking meds  Per son: used to take statin           VTE Risk Mitigation         Ordered     High Risk of VTE  Once      02/25/18 0816     Place sequential compression device  Until discontinued      02/25/18 0816             ROSA Ferguson  Department of Hospital Medicine   [facility]

## 2018-02-25 NOTE — NURSING
MD notified patient had not voided in 6 hours, when straight cath'd at 1030.  Bladder hollis results show 165cc.  New orders were to place gipson catheter, will continue to monitor.

## 2018-02-25 NOTE — PLAN OF CARE
Outside Transfer Acceptance Note    Transferring Physician or Mid Level Provider/Speciality: Dr. Alonzo / ED     Accepting Physician: Daron Rogel     Date of Acceptance: 02/25/2018     Code Status: FULL     Transferring Facility/Hospital: [facility]     Reason for Transfer [place] [facility]: Neurology, Neurosurgery evaluation for ? Hydrocephalus     Report from Transferring Physician or Mid-Level provider/Hospital course:     63 M with h/o HTN, HLD, dementia, poor functional status at baseline who lives with son. Son brought patient to ED with poor PO intake, dehydration and increased lethargy over for couple of weeks. Both patient and son poor informant.   She is mostly bed bound with contracted extremities per sending physician.     Unable to get PIV access in ED due to dehydration. R subclavian TLC placed in ED and patient developed R pneumothorax as complication. Chest tube was placed with resolution of PTX on repeat CXR and chest tube was removed. Not in respiratory distress with O2 sat 95% on RA. She moves all her extremities and mumbles few words. CT head showed enlarged ventricles due to hydrocephalus vs underlying dementia per radiology.     Afebrile and vitals stable     Labs : H/H 12/31  Na 153, K 3, Bun 79, creatinine 2.26 ( last creatinine 1.4 about a month ago )    Received 1L NS for pre renal ADITHYA     Sending facility does not have neurology or neurosurgery service available to evaluate for suspected hydrocephalus.         To do list upon patient arrival:   - monitor renal function closely   - Neurology, +/- Neurosurgery consult    - GI consult for GIB   - monitor H/H frequently and transfuse blood prn     Addendum : Informed by [facility] around 5:30 am that patient had change in clinical status with moderate size BRBPR. Vitals stable. Advised to hold transport for now pending repeat H/H.     Please call extension [phone] upon patient arrival to floor for Hospital Medicine admit team assignment and for  additional admit orders. If patient is coming from another [facility] facility please also call [phone] to inform the admit team/office that patient has arrived from the [facility] facility to the floor so patient can be evaluated.      Daron Rogel DO  Attending Staff Physician   [facility]

## 2018-02-25 NOTE — HPI
"Patient is a 63 year old female who is admitted to the IM service for workup of GI bleed. Patient was diagnosed with NPH by her neurosurgeon at home, who planned for  shunt placement tomorrow. She was taken to OSH by her son due to GI bleeding, there was no neurosurgery available at OSH, and she was transferred here for neurosurgical evaluation.    PER IM NOTE:    63 M with h/o HTN, HLD, stroke 9/2017 and since then patient functional status has been declining.She is not walking at all, not eating and unable to work,developed some dementia associated with this most recent stroke. She is no longer able to manage her own finances. She also requires assistance with houshold chores like cooking and cleaning. She is able to feed, dress and bath herself. Since the stroke she has had difficulty controlling her emotions and will cry or laugh for no apparent reason.Her son how is her primary caregiver, brought her to her PCP, MRI head was done, concerning for NPH, referred to  neurosurgery which planning to do "stent" some timed this week per son. But due to sudden GI bleed yesterday 2/24 patient was brought to ED at [facility].  No significant drop on H/H, received 1L NS for pre renal ADITHYA  And transferred [facility] for neurology/nurosergey evaluation.   "

## 2018-02-25 NOTE — SUBJECTIVE & OBJECTIVE
Past Medical History:   Diagnosis Date    Hyperlipidemia     Hypertension     Stroke 09/2017    residual deficits are ambulates on her own with a limp, but has limited use of her right arm; emotional lability    Vascular dementia     since 9/2017 stroke       History reviewed. No pertinent surgical history.    Review of patient's allergies indicates:  No Known Allergies    No current facility-administered medications on file prior to encounter.      Current Outpatient Prescriptions on File Prior to Encounter   Medication Sig    atorvastatin (LIPITOR) 40 MG tablet     losartan-hydrochlorothiazide 100-25 mg (HYZAAR) 100-25 mg per tablet     potassium chloride (K-TAB) 20 mEq      Family History     None        Social History Main Topics    Smoking status: Former Smoker    Smokeless tobacco: Not on file    Alcohol use 8.4 oz/week     14 Shots of liquor per week    Drug use: No    Sexual activity: Not on file     Review of Systems   Unable to perform ROS: Dementia     Objective:     Vital Signs (Most Recent):  Temp: 98.8 °F (37.1 °C) (02/25/18 1128)  Pulse: 89 (02/25/18 1128)  Resp: 16 (02/25/18 1128)  BP: (!) 117/57 (02/25/18 1128)  SpO2: 100 % (02/25/18 1128) Vital Signs (24h Range):  Temp:  [98.1 °F (36.7 °C)-98.8 °F (37.1 °C)] 98.8 °F (37.1 °C)  Pulse:  [89-94] 89  Resp:  [16] 16  SpO2:  [95 %-100 %] 100 %  BP: (117-156)/(57-72) 117/57     Weight: 40 kg (88 lb 2.9 oz)  Body mass index is 15.14 kg/m².    Physical Exam   Constitutional: No distress.   HENT:   Head: Normocephalic and atraumatic.   Eyes: Pupils are equal, round, and reactive to light. No scleral icterus.   Cardiovascular: Normal rate, regular rhythm and normal heart sounds.    No murmur heard.  Pulmonary/Chest: No respiratory distress.   Abdominal: Soft. Bowel sounds are normal. She exhibits no distension. There is no tenderness.   Rectal exam : no external hemorrhoids, fissures , normal rectal tone, stool at rectal vault. No blood     Musculoskeletal: She exhibits no edema, tenderness or deformity.   Lymphadenopathy:     She has no cervical adenopathy.   Neurological: She is alert.   disoriented to time, place and situation    Skin: She is not diaphoretic. No pallor.   Vitals reviewed.        CRANIAL NERVES     CN III, IV, VI   Pupils are equal, round, and reactive to light.       Significant Labs:   CBC:   Recent Labs  Lab 02/25/18  0830   WBC 13.35*   HGB 10.8*   HCT 33.6*        CMP:   Recent Labs  Lab 02/25/18  0830   *   K 3.4*   *   CO2 23   *   BUN 62*   CREATININE 1.4   CALCIUM 9.4   PROT 6.9   ALBUMIN 2.5*   BILITOT 1.2*   ALKPHOS 84   AST 27   ALT 17   ANIONGAP 13   EGFRNONAA 40.6*       Significant Imaging: non

## 2018-02-25 NOTE — CONSULTS
"Consult for "Transferred for neurology evaluation for worsening dementa vs NPH."  Patient with known NPH, even has planned surgical intervention tomorrow at outside hospital.  Admitted here for GI bleed.    If dementia is worsening in setting of known NPH, the treatment is as planned (shunting).  There are no other acute treatments of dementia.    No further neurology evaluations necessary unless primary team feels there are neuro signs other than delirium and dementia as these are expected with her NPH.    Please call us back if we can be of any further assistance.     "

## 2018-02-26 PROBLEM — R33.9 URINARY RETENTION: Status: ACTIVE | Noted: 2018-02-26

## 2018-02-26 LAB
ALBUMIN SERPL BCP-MCNC: 2.1 G/DL
ALP SERPL-CCNC: 84 U/L
ALT SERPL W/O P-5'-P-CCNC: 16 U/L
ANION GAP SERPL CALC-SCNC: 11 MMOL/L
ANION GAP SERPL CALC-SCNC: 13 MMOL/L
ANION GAP SERPL CALC-SCNC: 13 MMOL/L
ANION GAP SERPL CALC-SCNC: 9 MMOL/L
ANISOCYTOSIS BLD QL SMEAR: SLIGHT
AST SERPL-CCNC: 30 U/L
BASOPHILS # BLD AUTO: 0.01 K/UL
BASOPHILS # BLD AUTO: 0.01 K/UL
BASOPHILS NFR BLD: 0.1 %
BASOPHILS NFR BLD: 0.1 %
BILIRUB SERPL-MCNC: 0.9 MG/DL
BUN SERPL-MCNC: 28 MG/DL
BUN SERPL-MCNC: 34 MG/DL
BUN SERPL-MCNC: 40 MG/DL
BUN SERPL-MCNC: 44 MG/DL
CALCIUM SERPL-MCNC: 8.1 MG/DL
CALCIUM SERPL-MCNC: 8.7 MG/DL
CALCIUM SERPL-MCNC: 8.8 MG/DL
CALCIUM SERPL-MCNC: 9 MG/DL
CHLORIDE SERPL-SCNC: 112 MMOL/L
CHLORIDE SERPL-SCNC: 116 MMOL/L
CHLORIDE SERPL-SCNC: 117 MMOL/L
CHLORIDE SERPL-SCNC: 119 MMOL/L
CO2 SERPL-SCNC: 21 MMOL/L
CO2 SERPL-SCNC: 23 MMOL/L
CO2 SERPL-SCNC: 24 MMOL/L
CO2 SERPL-SCNC: 26 MMOL/L
CREAT SERPL-MCNC: 1 MG/DL
CREAT SERPL-MCNC: 1.2 MG/DL
DIFFERENTIAL METHOD: ABNORMAL
DIFFERENTIAL METHOD: ABNORMAL
EOSINOPHIL # BLD AUTO: 0 K/UL
EOSINOPHIL # BLD AUTO: 0 K/UL
EOSINOPHIL NFR BLD: 0 %
EOSINOPHIL NFR BLD: 0.1 %
ERYTHROCYTE [DISTWIDTH] IN BLOOD BY AUTOMATED COUNT: 14.9 %
ERYTHROCYTE [DISTWIDTH] IN BLOOD BY AUTOMATED COUNT: 15 %
EST. GFR  (AFRICAN AMERICAN): 56.4 ML/MIN/1.73 M^2
EST. GFR  (AFRICAN AMERICAN): >60 ML/MIN/1.73 M^2
EST. GFR  (NON AFRICAN AMERICAN): 48.9 ML/MIN/1.73 M^2
EST. GFR  (NON AFRICAN AMERICAN): >60 ML/MIN/1.73 M^2
GLUCOSE SERPL-MCNC: 118 MG/DL
GLUCOSE SERPL-MCNC: 140 MG/DL
GLUCOSE SERPL-MCNC: 141 MG/DL
GLUCOSE SERPL-MCNC: 143 MG/DL
HCT VFR BLD AUTO: 28.4 %
HCT VFR BLD AUTO: 29.8 %
HGB BLD-MCNC: 9 G/DL
HGB BLD-MCNC: 9.5 G/DL
IMM GRANULOCYTES # BLD AUTO: 0.07 K/UL
IMM GRANULOCYTES # BLD AUTO: 0.1 K/UL
IMM GRANULOCYTES NFR BLD AUTO: 0.7 %
IMM GRANULOCYTES NFR BLD AUTO: 0.9 %
LYMPHOCYTES # BLD AUTO: 0.6 K/UL
LYMPHOCYTES # BLD AUTO: 0.9 K/UL
LYMPHOCYTES NFR BLD: 5.4 %
LYMPHOCYTES NFR BLD: 8.6 %
MAGNESIUM SERPL-MCNC: 1.8 MG/DL
MCH RBC QN AUTO: 30 PG
MCH RBC QN AUTO: 30.3 PG
MCHC RBC AUTO-ENTMCNC: 31.7 G/DL
MCHC RBC AUTO-ENTMCNC: 31.9 G/DL
MCV RBC AUTO: 95 FL
MCV RBC AUTO: 95 FL
MONOCYTES # BLD AUTO: 0.5 K/UL
MONOCYTES # BLD AUTO: 0.8 K/UL
MONOCYTES NFR BLD: 5.1 %
MONOCYTES NFR BLD: 6.8 %
NEUTROPHILS # BLD AUTO: 8.4 K/UL
NEUTROPHILS # BLD AUTO: 9.9 K/UL
NEUTROPHILS NFR BLD: 85.4 %
NEUTROPHILS NFR BLD: 86.8 %
NRBC BLD-RTO: 0 /100 WBC
NRBC BLD-RTO: 0 /100 WBC
PHOSPHATE SERPL-MCNC: 2.4 MG/DL
PLATELET # BLD AUTO: 140 K/UL
PLATELET # BLD AUTO: ABNORMAL K/UL
PLATELET BLD QL SMEAR: ABNORMAL
PMV BLD AUTO: 11.3 FL
PMV BLD AUTO: 12.6 FL
POTASSIUM SERPL-SCNC: 3.1 MMOL/L
POTASSIUM SERPL-SCNC: 3.2 MMOL/L
POTASSIUM SERPL-SCNC: 3.5 MMOL/L
POTASSIUM SERPL-SCNC: 3.8 MMOL/L
PROT SERPL-MCNC: 6.2 G/DL
RBC # BLD AUTO: 3 M/UL
RBC # BLD AUTO: 3.14 M/UL
RPR SER QL: NORMAL
SODIUM SERPL-SCNC: 146 MMOL/L
SODIUM SERPL-SCNC: 150 MMOL/L
SODIUM SERPL-SCNC: 154 MMOL/L
SODIUM SERPL-SCNC: 154 MMOL/L
WBC # BLD AUTO: 11.35 K/UL
WBC # BLD AUTO: 9.87 K/UL

## 2018-02-26 PROCEDURE — 25000003 PHARM REV CODE 250: Performed by: STUDENT IN AN ORGANIZED HEALTH CARE EDUCATION/TRAINING PROGRAM

## 2018-02-26 PROCEDURE — 11000001 HC ACUTE MED/SURG PRIVATE ROOM

## 2018-02-26 PROCEDURE — 99233 SBSQ HOSP IP/OBS HIGH 50: CPT | Mod: ,,, | Performed by: HOSPITALIST

## 2018-02-26 PROCEDURE — 36415 COLL VENOUS BLD VENIPUNCTURE: CPT

## 2018-02-26 PROCEDURE — 63600175 PHARM REV CODE 636 W HCPCS: Performed by: STUDENT IN AN ORGANIZED HEALTH CARE EDUCATION/TRAINING PROGRAM

## 2018-02-26 PROCEDURE — G8996 SWALLOW CURRENT STATUS: HCPCS | Mod: CK

## 2018-02-26 PROCEDURE — A4216 STERILE WATER/SALINE, 10 ML: HCPCS | Performed by: STUDENT IN AN ORGANIZED HEALTH CARE EDUCATION/TRAINING PROGRAM

## 2018-02-26 PROCEDURE — 80048 BASIC METABOLIC PNL TOTAL CA: CPT | Mod: 91

## 2018-02-26 PROCEDURE — 97162 PT EVAL MOD COMPLEX 30 MIN: CPT

## 2018-02-26 PROCEDURE — 86580 TB INTRADERMAL TEST: CPT | Performed by: STUDENT IN AN ORGANIZED HEALTH CARE EDUCATION/TRAINING PROGRAM

## 2018-02-26 PROCEDURE — 83735 ASSAY OF MAGNESIUM: CPT

## 2018-02-26 PROCEDURE — G8997 SWALLOW GOAL STATUS: HCPCS | Mod: CJ

## 2018-02-26 PROCEDURE — 85025 COMPLETE CBC W/AUTO DIFF WBC: CPT | Mod: 91

## 2018-02-26 PROCEDURE — 97166 OT EVAL MOD COMPLEX 45 MIN: CPT

## 2018-02-26 PROCEDURE — 80053 COMPREHEN METABOLIC PANEL: CPT

## 2018-02-26 PROCEDURE — 80048 BASIC METABOLIC PNL TOTAL CA: CPT

## 2018-02-26 PROCEDURE — 84100 ASSAY OF PHOSPHORUS: CPT

## 2018-02-26 PROCEDURE — 92610 EVALUATE SWALLOWING FUNCTION: CPT

## 2018-02-26 RX ORDER — NAPROXEN SODIUM 220 MG/1
81 TABLET, FILM COATED ORAL DAILY
Status: ON HOLD | COMMUNITY
End: 2018-03-06

## 2018-02-26 RX ORDER — POTASSIUM CHLORIDE 7.45 MG/ML
10 INJECTION INTRAVENOUS
Status: COMPLETED | OUTPATIENT
Start: 2018-02-26 | End: 2018-02-27

## 2018-02-26 RX ADMIN — Medication 5 UNITS: at 03:02

## 2018-02-26 RX ADMIN — POTASSIUM PHOSPHATE, MONOBASIC AND POTASSIUM PHOSPHATE, DIBASIC 20 MMOL: 224; 236 INJECTION, SOLUTION, CONCENTRATE INTRAVENOUS at 09:02

## 2018-02-26 RX ADMIN — POTASSIUM CHLORIDE 10 MEQ: 10 INJECTION, SOLUTION INTRAVENOUS at 11:02

## 2018-02-26 RX ADMIN — Medication 3 ML: at 09:02

## 2018-02-26 RX ADMIN — Medication 3 ML: at 03:02

## 2018-02-26 RX ADMIN — POTASSIUM CHLORIDE 10 MEQ: 10 INJECTION, SOLUTION INTRAVENOUS at 10:02

## 2018-02-26 RX ADMIN — DEXTROSE: 5 SOLUTION INTRAVENOUS at 11:02

## 2018-02-26 RX ADMIN — DEXTROSE: 5 SOLUTION INTRAVENOUS at 03:02

## 2018-02-26 NOTE — PT/OT/SLP EVAL
Occupational Therapy   Evaluation    Name: Lisbeth Seaman  MRN: [id number]  Admitting Diagnosis:  Encephalopathy, metabolic      Recommendations:     Discharge Recommendations: nursing facility, skilled  Discharge Equipment Recommendations:  none  Barriers to discharge:  None    History:     Occupational Profile:  Living Environment: Pt lives with son who provides all care   Previous level of function: Pt son has been providing dependent self care assistance   Equipment Owned:   (pt with all DME in home from spouse)  Assistance upon Discharge: son available to assist after DC     Past Medical History:   Diagnosis Date    Hyperlipidemia     Hypertension     Stroke 09/2017    residual deficits are ambulates on her own with a limp, but has limited use of her right arm; emotional lability    Vascular dementia     since 9/2017 stroke       History reviewed. No pertinent surgical history.    Subjective     Chief Complaint: bleeding from rectum   Patient/Family stated goals: return to home  Communicated with: RN prior to session.  Pain/Comfort:  · Pain Rating 1: 0/10    Patients cultural, spiritual, Rastafarian conflicts given the current situation: none stated     Objective:     Patient found with: telemetry, gipson catheter, peripheral IV    General Precautions: Standard, fall   Orthopedic Precautions:N/A   Braces: N/A     Occupational Performance:    Bed Mobility:    · Patient completed Rolling/Turning to Left with  total assistance  · Patient completed Rolling/Turning to Right with total assistance    Functional Mobility/Transfers:  · Pt unable to tolerate mobility     Activities of Daily Living:  · Pt dependent with all self care    Cognitive/Visual Perceptual:  Cognitive/Psychosocial Skills:     -       Oriented to: Pt unable to provide orientation    -       Follows Commands/attention:unable to follow direction   -       Communication: pt not communicative     Physical Exam:  Pt frail and presented in fetal  "position.  Unable to test strength formally due to unable to follow directions.  Pt bleeding from anus.  Informed RN    Patient left left sidelying with all lines intact    AMPAC 6 Click:  AMPAC Total Score: 6    Treatment & Education:  Evaluation completed as able. Pt educated on safety, role of OT, importance of increased participation in self care for gains , expectations for participation, expectations for gains, POC, energy conservation, caregiver strain.     Education:    Assessment:     Lisbeth Seaman is a 63 y.o. female with a medical diagnosis of Encephalopathy, metabolic.  She presents with son and sister and with poor cognition and performance,  Son provides all care and pt dependnet care .  Performance deficits affecting function are weakness, impaired endurance, impaired functional mobilty, impaired self care skills, impaired cognition, decreased upper extremity function, decreased lower extremity function.      Rehab Prognosis:  poor; patient would benefit from acute skilled OT services to address these deficits and reach maximum level of function.         Clinical Decision Makin.  OT Mod:  "Pt evaluation falls under moderate complexity for evaluation coding due to identification of 3-5 performance deficits noted as stated above. Eval required Min/Mod assistance to complete on this date and detailed assessment(s) were utilized. Moreover, an expanded review of history and occupational profile obtained with additional review of cognitive, physical and psychosocial hx."     Plan:     Patient to be seen 3 x/week to address the above listed problems via self-care/home management, therapeutic activities, therapeutic exercises  · Plan of Care Expires:    · Plan of Care Reviewed with: patient, son    This Plan of care has been discussed with the patient who was involved in its development and understands and is in agreement with the identified goals and treatment plan    GOALS:    Occupational Therapy " Goals        Problem: Occupational Therapy Goal    Goal Priority Disciplines Outcome Interventions   Occupational Therapy Goal     OT, PT/OT Ongoing (interventions implemented as appropriate)    Description:  Goals to be met by: 3/15/18    Patient will increase functional independence with ADLs by performing:    UE Dressing with Minimal Assistance.  LE Dressing with Moderate Assistance.  Toileting from bedside commode with Moderate Assistance for hygiene and clothing management.                       Time Tracking:     OT Date of Treatment: 02/26/18  OT Start Time: 1130  OT Stop Time: 1145  OT Total Time (min): 15 min    Billable Minutes:Evaluation 15    Ashley Morejon, OT  2/26/2018

## 2018-02-26 NOTE — PT/OT/SLP EVAL
Speech Language Pathology Evaluation  Bedside Swallow    Patient Name:  Lisbeth Seaman   MRN:  [id number]   948/948 A    Admitting Diagnosis: Encephalopathy, metabolic    Recommendations:                 General Recommendations:  Dysphagia therapy and RD consult  Diet recommendations:  Puree, Thin   Aspiration Precautions:   · 1 bite/sip at a time,   · Alternating bites/sips,   · Assistance with meals,   · Check for pocketing/oral residue,   · Feed only when awake/alert,   · HOB to 90 degrees,   · Meds crushed in puree,   · Monitor for s/s of aspiration,   · Remain upright 30 minutes post meal,  · Small bites/sips and   · Strict aspiration precautions   General Precautions: Standard, aspiration, fall, pureed diet  Communication strategies:  provide increased time to answer    History:     Past Medical History:   Diagnosis Date    Hyperlipidemia     Hypertension     Stroke 09/2017    residual deficits are ambulates on her own with a limp, but has limited use of her right arm; emotional lability    Vascular dementia     since 9/2017 stroke       History reviewed. No pertinent surgical history.    Social History: Patient lives with son      Chest X-Rays:   Results for orders placed or performed during the hospital encounter of 02/25/18   X-Ray Chest 1 View    Narrative    Comparison: none.     Results: One view chest obtained.  The lungs are clear. The cardiomediastinal contour is within normal limits. No findings to suggest pleural effusions. No pneumothorax.Linear soft tissue lucency noted over the right chest wall, may represent emphysematous change from prior procedure.    Impression     No acute findings.         Electronically signed by: Andreas Peguero MD  Date:     02/25/18  Time:    14:11        HPI: 63 M with h/o HTN, HLD, stroke 9/2017 and since then patient functional status has been declining.She is not walking at all, not eating and unable to work,developed some dementia associated with this most recent  "stroke. She is no longer able to manage her own finances. She also requires assistance with houshold chores like cooking and cleaning. She is able to feed, dress and bath herself. Since the stroke she has had difficulty controlling her emotions and will cry or laugh for no apparent reason.Her son how is her primary caregiver, brought her to her PCP, MRI head was done, concerning for NPH, referred to  neurosurgery which planning to do "stent" some timed this week per son. But due to sudden GI bleed yesterday 2/24 patient was brought to ED at [facility].  No significant drop on H/H, received 1L NS for pre renal ADITHYA  And transferred to [facility] for neurology/nurosergey evaluation.        Prior diet: unknown, pt poor historian, no family present, no previous SLP notes in Epic system      Subjective     Patient awake and contracted. Confusion evident. Pt repositioned and HOB elevated.  Patient goals: none stated     Objective:     Oral Musculature Evaluation  · Oral Musculature: unable to assess due to poor participation/comprehension  · Volitional Cough: weak  · Volitional Swallow: did not follow direction to slicit volitional swallow  · Voice Prior to PO Intake: hoarse, dysphonic, minimal vocalizations    Bedside Swallow Eval:   Consistencies Assessed:  · Thin liquids ice chip x1, tsp sip x2, cup sips x2, straw sips x5  · Puree 1/4 tsp pt regulated bites x2     Oral Phase:   · Decreased closure around utensil  · Poor oral acceptance  · Slow oral transit time    Pharyngeal Phase:   · delayed swallow initation  · no overt clinical signs/symptoms of aspiration    Compensatory Strategies  · None    Treatment: Solids not trialed 2/2 oral holding of puree trials and decreased bolus manipulation noted. Pt required liquid wash to clear puree trials. No overt s/s of aspiration with all trials. Oral cavity clear s/p trials. Pt would benefit from a registered dietician consult for possible calorie " consult/nutritional supplement.     Assessment:     Lisbeth Seaman is a 63 y.o. female with an SLP diagnosis of Dysphagia.  She presents with oral dysphagia c/b bolus holding requiring liquid wash to swallow puree trials.    Goals:    SLP Goals        Problem: SLP Goal    Goal Priority Disciplines Outcome   SLP Goal     SLP Ongoing (interventions implemented as appropriate)   Description:  Speech Therapy Short Term Goals  Goal expected to be met by 3/5  1. Pt will tolerate puree diet and thin liquids with no overt s/s of aspiration noted.   2. Pt will participate in an ongoing assessment to determine the least restrictive and safest po diet.                       Plan:     · Patient to be seen:  4 x/week   · Plan of Care expires:  03/27/18  · Plan of Care reviewed with:  patient   · SLP Follow-Up:  Yes       Discharge recommendations:   (tbd)       Time Tracking:     SLP Treatment Date:   02/26/18  Speech Start Time:  0821  Speech Stop Time:  0836     Speech Total Time (min):  15 min    Billable Minutes: Eval Swallow and Oral Function 15    FIFI Markham, CCC-SLP   Pager: [phone]  02/26/2018

## 2018-02-26 NOTE — ASSESSMENT & PLAN NOTE
- Scheduled for outpatient procedure at OSH on 02/26  - No intervention while here  - Might require a therapeutic/diagnostic LP inpatient if AMS does not resolve w/ hypernatremia resolution

## 2018-02-26 NOTE — PLAN OF CARE
Problem: Physical Therapy Goal  Goal: Physical Therapy Goal  Goals to be met by: 3/9/18     Patient will increase functional independence with mobility by performin. Supine to sit with Maximum Assistance  2. Sit to supine with Maximum Assistance  3. Sit to stand transfer with Moderate Assistance  4. Bed to chair transfer with Moderate Assistance using most appropriate transfer and AD.   5. Gait  x 20 feet with Moderate Assistance using Rolling Walker.   6. Lower extremity exercise program x10 reps with assistance as needed for strengthening and endurance.         PT evaluation completed. POC and goals established.    Janette Glasgow, PT, DPT  2018

## 2018-02-26 NOTE — ASSESSMENT & PLAN NOTE
Informed by R[facility] around 5:30 am that patient had change in clinical status with moderate size BRBPR on 2/23/2018 at outside hospital.   No GI bleed reported since admission  Rectal exam not significant.   - H/H stable  - Hemoccult stool test positive; will require outpatient GI follow up   - Transfuse if hgb < 7

## 2018-02-26 NOTE — PROGRESS NOTES
"[facility] Hospital Medicine  Progress Note    Patient Name: Lisbeth Seaman  MRN: [id number]  Patient Class: IP- Inpatient   Admission Date: 2018  Length of Stay: 1 days  Attending Physician: Ezekiel Boyer MD  Primary Care Provider: Tamara Roldan MD    [facility] Medicine Team: [facility]OSP MED 1 Micheline Crawley MD    Subjective:     Principal Problem:Encephalopathy, metabolic    HPI:  63 M with h/o HTN, HLD, stroke 2017 and since then patient functional status has been declining.She is not walking at all, not eating and unable to work,developed some dementia associated with this most recent stroke. She is no longer able to manage her own finances. She also requires assistance with houshold chores like cooking and cleaning. She is able to feed, dress and bath herself. Since the stroke she has had difficulty controlling her emotions and will cry or laugh for no apparent reason.Her son how is her primary caregiver, brought her to her PCP, MRI head was done, concerning for NPH, referred to  neurosurgery which planning to do "stent" some timed this week per son. But due to sudden GI bleed yesterday  patient was brought to ED at [facility].  No significant drop on H/H, received 1L NS for pre renal ADITHYA  And transferred to [facility] for neurology/nurosergey evaluation.         Hospital Course:  Admitted to -1, neurology/neurosurgery consulted, will follow their recs, patient has leukocytosis, septic work up sent, H/H stable, no GI bleed since admission.    Pt able to respond to some questions and able to follow some commands. Continuing w/ IVF w/ improving hypernatremia. Will likely require SNF placement after d/c; PPD placed on       Interval History: NAEON. Patient mildly improved, able to state her name and  and denies pain. Still w/ hypernatremia, although improving w/ IVF. No acute intervention by Neurology or Neurosurgery at this time.   Possible " therapeutic tap after resolution of hypernatremia if AMS doesn't improve.    No other complaints at this time; family at bedside updated.     Review of Systems   Unable to perform ROS: Dementia   Constitutional: Positive for activity change.   Respiratory: Negative for cough.    Cardiovascular: Negative for leg swelling.   Gastrointestinal: Negative for anal bleeding, diarrhea, nausea and vomiting.   Psychiatric/Behavioral: Negative.      Objective:     Vital Signs (Most Recent):  Temp: 97.6 °F (36.4 °C) (18 112)  Pulse: 93 (18 112)  Resp: 16 (18)  BP: 112/74 (18)  SpO2: 100 % (18) Vital Signs (24h Range):  Temp:  [97.6 °F (36.4 °C)-99.9 °F (37.7 °C)] 97.6 °F (36.4 °C)  Pulse:  [] 93  Resp:  [12-19] 16  SpO2:  [96 %-100 %] 100 %  BP: ()/(57-74) 112/74     Weight: 41 kg (90 lb 6.2 oz)  Body mass index is 15.52 kg/m².    Physical Exam   Constitutional: No distress.   HENT:   Head: Normocephalic and atraumatic.   Eyes: Pupils are equal, round, and reactive to light. No scleral icterus.   Cardiovascular: Normal rate, regular rhythm and normal heart sounds.    No murmur heard.  Pulmonary/Chest: No respiratory distress.   Abdominal: Soft. Bowel sounds are normal. She exhibits no distension. There is no tenderness.   Rectal exam : no external hemorrhoids, fissures , normal rectal tone, stool at rectal vault. No blood    Musculoskeletal: She exhibits no edema, tenderness or deformity.   Lymphadenopathy:     She has no cervical adenopathy.   Neurological: She is alert.   disoriented to time, place and situation  Oriented to self and     Skin: She is not diaphoretic. No pallor.   Vitals reviewed.        CRANIAL NERVES     CN III, IV, VI   Pupils are equal, round, and reactive to light.       Significant Labs:   CBC:     Recent Labs  Lab 18  0830 18  0429   WBC 13.35* 11.35   HGB 10.8* 9.5*   HCT 33.6* 29.8*    SEE COMMENT     CMP:   Recent  Labs  Lab 02/25/18  0830  02/26/18  0010 02/26/18  0429 02/26/18  1221   *  < > 154* 154* 150*   K 3.4*  < > 3.5 3.2* 3.8   *  < > 119* 117* 116*   CO2 23  < > 26 24 21*   *  < > 143* 140* 141*   BUN 62*  < > 44* 40* 34*   CREATININE 1.4  < > 1.2 1.2 1.2   CALCIUM 9.4  < > 9.0 8.7 8.8   PROT 6.9  --   --  6.2  --    ALBUMIN 2.5*  --   --  2.1*  --    BILITOT 1.2*  --   --  0.9  --    ALKPHOS 84  --   --  84  --    AST 27  --   --  30  --    ALT 17  --   --  16  --    ANIONGAP 13  < > 9 13 13   EGFRNONAA 40.6*  < > 48.9* 48.9* 48.9*   < > = values in this interval not displayed.    Significant Imaging: I have reviewed all pertinent imaging results/findings within the past 24 hours.    Assessment/Plan:      * Encephalopathy, metabolic    Vascular dementia without behavioral disturbance  Hydrocephalus  Patient presented with decline functional status post strok 9/2017, got worse in the last 6 weeks.  CT head at outside facility showed enlarged ventricles due to hydrocephalus vs underlying dementia per radiology.   planned surgical intervention tomorroe at outside hospital per patient's son.  Currently patient disoriented to time place and situation, not following all commands  - Neurosurgery consulted: No acute intervention at this time. Suggest to follow up w/ the NSGY at the OSH  - Neurology consulted:  No further neurology evaluations necessary   - Hypernatremia likely contributing to AMS; improving to Na 154 today; will continue IVF  - TSH 0.247/B12 >2000/FOLATE 44/RPR negative  - Toxic screen negative  - Might require a therapeutic/diagnostic tap if AMS does not improve w/ resolution of hypernatremia               Urinary retention    - Unclear etiology  - Iniguez in place; will continue to monitor for resolution           Leukocytosis    - Patient afebrile and vitally stable   - PCT mildly elevated  - Resolved w/ IVF; likely 2/2 hemoconcentration  - Will continue to follow           Hypophosphatemia    - Phos 2.4 on 02/26  - Will continue to replete         Hypokalemia    - Resolved w/ repletion         Hypernatremia    - Started on D5W at 75m/h   - monitor BMP Q8 HRs   - Improving to 150 this afternoon        NPH (normal pressure hydrocephalus)    - Scheduled for outpatient procedure at OSH on 02/26  - No intervention while here  - Might require a therapeutic/diagnostic LP inpatient if AMS does not resolve w/ hypernatremia resolution        GIB (gastrointestinal bleeding)    Informed by C around 5:30 am that patient had change in clinical status with moderate size BRBPR on 2/23/2018 at outside hospital.   No GI bleed reported since admission  Rectal exam not significant.   - H/H stable  - Hemoccult stool test positive; will require outpatient GI follow up   - Transfuse if hgb < 7            Hypertension, essential    - Currently not taking any meds due to mental statue decline  - Will monitor and start med if uncontrolled .         Anemia    Likely multifactorial: CKD, iron deficiency,decrease oral intake and acute GI loss.   - H/H 9.5/29.8  - No occult blood noted since the transfer  - Will continue to monitor; no GI consult required at this time   - Transfuse if hgb < 7            CKD (chronic kidney disease), stage III    - Avoid renal toxic meds  - Renally dose meds  - Cr stable at 1.2        Hyperlipidemia    Currently not taking meds  Per son: used to take statin         Vascular dementia without behavioral disturbance    - No clear baseline as patient is currently altered          VTE Risk Mitigation         Ordered     High Risk of VTE  Once      02/25/18 0816     Place sequential compression device  Until discontinued      02/25/18 0816              Micheline Crawley MD  Department of Hospital Medicine   [facility]

## 2018-02-26 NOTE — PLAN OF CARE
Problem: SLP Goal  Goal: SLP Goal  Speech Therapy Short Term Goals  Goal expected to be met by 3/5  1. Pt will tolerate puree diet and thin liquids with no overt s/s of aspiration noted.   2. Pt will participate in an ongoing assessment to determine the least restrictive and safest po diet.     Outcome: Ongoing (interventions implemented as appropriate)  Bedside swallowing assessment completed. Recommend: Puree, thin, crushed po medications in puree, alternate bites/sips, check for oral pocketing, strict aspiration precautions.   KAL Recinos., CCC-SLP  Pager: [phone]  02/26/2018

## 2018-02-26 NOTE — SUBJECTIVE & OBJECTIVE
Interval History: NAEON. Patient mildly improved, able to state her name and  and denies pain. Still w/ hypernatremia, although improving w/ IVF. No acute intervention by Neurology or Neurosurgery at this time.   Possible therapeutic tap after resolution of hypernatremia if AMS doesn't improve.    No other complaints at this time; family at bedside updated.     Review of Systems   Unable to perform ROS: Dementia   Constitutional: Positive for activity change.   Respiratory: Negative for cough.    Cardiovascular: Negative for leg swelling.   Gastrointestinal: Negative for anal bleeding, diarrhea, nausea and vomiting.   Psychiatric/Behavioral: Negative.      Objective:     Vital Signs (Most Recent):  Temp: 97.6 °F (36.4 °C) (18)  Pulse: 93 (18)  Resp: 16 (18)  BP: 112/74 (18)  SpO2: 100 % (18) Vital Signs (24h Range):  Temp:  [97.6 °F (36.4 °C)-99.9 °F (37.7 °C)] 97.6 °F (36.4 °C)  Pulse:  [] 93  Resp:  [12-19] 16  SpO2:  [96 %-100 %] 100 %  BP: ()/(57-74) 112/74     Weight: 41 kg (90 lb 6.2 oz)  Body mass index is 15.52 kg/m².    Physical Exam   Constitutional: No distress.   HENT:   Head: Normocephalic and atraumatic.   Eyes: Pupils are equal, round, and reactive to light. No scleral icterus.   Cardiovascular: Normal rate, regular rhythm and normal heart sounds.    No murmur heard.  Pulmonary/Chest: No respiratory distress.   Abdominal: Soft. Bowel sounds are normal. She exhibits no distension. There is no tenderness.   Rectal exam : no external hemorrhoids, fissures , normal rectal tone, stool at rectal vault. No blood    Musculoskeletal: She exhibits no edema, tenderness or deformity.   Lymphadenopathy:     She has no cervical adenopathy.   Neurological: She is alert.   disoriented to time, place and situation  Oriented to self and     Skin: She is not diaphoretic. No pallor.   Vitals reviewed.        CRANIAL NERVES     CN III, IV, VI    Pupils are equal, round, and reactive to light.       Significant Labs:   CBC:     Recent Labs  Lab 02/25/18  0830 02/26/18  0429   WBC 13.35* 11.35   HGB 10.8* 9.5*   HCT 33.6* 29.8*    SEE COMMENT     CMP:   Recent Labs  Lab 02/25/18  0830  02/26/18  0010 02/26/18  0429 02/26/18  1221   *  < > 154* 154* 150*   K 3.4*  < > 3.5 3.2* 3.8   *  < > 119* 117* 116*   CO2 23  < > 26 24 21*   *  < > 143* 140* 141*   BUN 62*  < > 44* 40* 34*   CREATININE 1.4  < > 1.2 1.2 1.2   CALCIUM 9.4  < > 9.0 8.7 8.8   PROT 6.9  --   --  6.2  --    ALBUMIN 2.5*  --   --  2.1*  --    BILITOT 1.2*  --   --  0.9  --    ALKPHOS 84  --   --  84  --    AST 27  --   --  30  --    ALT 17  --   --  16  --    ANIONGAP 13  < > 9 13 13   EGFRNONAA 40.6*  < > 48.9* 48.9* 48.9*   < > = values in this interval not displayed.    Significant Imaging: I have reviewed all pertinent imaging results/findings within the past 24 hours.

## 2018-02-26 NOTE — ASSESSMENT & PLAN NOTE
Vascular dementia without behavioral disturbance  Hydrocephalus  Patient presented with decline functional status post strok 9/2017, got worse in the last 6 weeks.  CT head at outside facility showed enlarged ventricles due to hydrocephalus vs underlying dementia per radiology.   planned surgical intervention tomorroe at outside hospital per patient's son.  Currently patient disoriented to time place and situation, not following all commands  - Neurosurgery consulted: No acute intervention at this time. Suggest to follow up w/ the NSGY at the OSH  - Neurology consulted:  No further neurology evaluations necessary   - Hypernatremia likely contributing to AMS; improving to Na 154 today; will continue IVF  - TSH 0.247/B12 >2000/FOLATE 44/RPR negative  - Toxic screen negative  - Might require a therapeutic/diagnostic tap if AMS does not improve w/ resolution of hypernatremia

## 2018-02-26 NOTE — PLAN OF CARE
Extended Emergency Contact Information  Primary Emergency Contact: PatyScooter shaw  Address: [address] [place] [address] [place]  Home Phone: [phone]  Mobile Phone: [phone]  Relation: Son  Preferred language: English   needed? No    Tamara Roldan MD  [address] / [place] [address]    Payor: BLUE CROSS BLUE SHIELD / Plan: BCBS ALL OUT OF STATE / Product Type: PPO /       [facility] - [place], [place] - [address]  [address]  [place] [address]  Phone: [phone] Fax: [phone]       02/26/18 1633   Discharge Assessment   Assessment Type Discharge Planning Assessment   Confirmed/corrected address and phone number on facesheet? Yes   Assessment information obtained from? Caregiver;Medical Record   Expected Length of Stay (days) 3   Communicated expected length of stay with patient/caregiver yes   Prior to hospitilization cognitive status: Alert/Oriented   Prior to hospitalization functional status: Independent   Current cognitive status: Unable to Assess   Current Functional Status: Needs Assistance   Lives With child(pilo), adult   Able to Return to Prior Arrangements unable to determine at this time (comments)   Is patient able to care for self after discharge? Unable to determine at this time (comments)   Patient's perception of discharge disposition home or selfcare   Readmission Within The Last 30 Days no previous admission in last 30 days   Patient currently being followed by outpatient case management? No   Patient currently receives any other outside agency services? No   Equipment Currently Used at Home none   Do you have any problems affording any of your prescribed medications? No   Is the patient taking medications as prescribed? yes   Does the patient have transportation home? Yes   Transportation Available family or friend will provide   Does the patient receive services at the [facility]mad[facility] Clinic? No   Discharge Plan A  Home with family   Discharge Plan B Skilled Nursing Facility   Patient/Family In Agreement With Plan yes

## 2018-02-26 NOTE — ASSESSMENT & PLAN NOTE
- Currently not taking any meds due to mental statue decline  - Will monitor and start med if uncontrolled .

## 2018-02-26 NOTE — PLAN OF CARE
Problem: Occupational Therapy Goal  Goal: Occupational Therapy Goal  Goals to be met by: 3/15/18    Patient will increase functional independence with ADLs by performing:    UE Dressing with Minimal Assistance.  LE Dressing with Moderate Assistance.  Toileting from bedside commode with Moderate Assistance for hygiene and clothing management.     Outcome: Ongoing (interventions implemented as appropriate)  Evaluation complete and goals set.  Cont with POC.  Ashley Morejon, OT  2/26/2018

## 2018-02-26 NOTE — ASSESSMENT & PLAN NOTE
- Patient afebrile and vitally stable   - PCT mildly elevated  - Resolved w/ IVF; likely 2/2 hemoconcentration  - Will continue to follow

## 2018-02-26 NOTE — PT/OT/SLP EVAL
Physical Therapy Evaluation    Patient Name:  Lisbeth Seaman   MRN:  [id number]    Recommendations:     Discharge Recommendations:  nursing facility, skilled (pending progress)   Discharge Equipment Recommendations:  TBD; likely all DME in place  Barriers to discharge: None ramp access; son able to care for patient    Assessment:     Lisbeth Seaman is a 63 y.o. female admitted with a medical diagnosis of Encephalopathy, metabolic.  She presents with the following impairments/functional limitations:  weakness, impaired endurance, impaired self care skills, impaired functional mobilty, impaired balance, decreased upper extremity function, decreased lower extremity function, gait instability, decreased safety awareness, pain, decreased coordination, impaired cognition.      Pt tolerated session well; pt demonstrated pain/discomfort with R knee flexion/extension assessment. Pt requires increase assist at this time due to general fatigue and weakness. Pt's mobility limited this visit due to PT finding bloody discharge from rectum upon rolling. RN notified and present to assess.   Pt would benefit from continued skilled physical therapy for the listed impairments to improve functional independence and overall safety with mobility prior to d/c. PT recommends d/c disposition to SNF pending progress and family's ability to continue care for patient.     Whiteboard updated with correct mobility information. RN/PCT notified.  Transfer with therapy only at this time.       Rehab Prognosis:  good; patient would benefit from acute skilled PT services to address these deficits and reach maximum level of function.      Recent Surgery: * No surgery found *      Plan:     During this hospitalization, patient to be seen 4 x/week to address the above listed problems via gait training, therapeutic activities, therapeutic exercises, neuromuscular re-education  · Plan of Care Expires:  03/28/18   Plan of Care Reviewed with: patient,  son    Subjective     Communicated with RN prior to session.  Patient found L sidelying, upon PT entry to room, agreeable to evaluation.      Pt soft spoken, but able to answer questions with increase time. Oriented to Person and place.   Patient comments/goals: To get better and go home  Pain/Comfort:  Discomfort indicated with R Knee movement    Patients cultural, spiritual, [employer] conflicts given the current situation: none stated    Living Environment:  Lives with: son  Lives in: [facility], ramp present  PLOF/Level of assist: Prior to a few months ago, pt was able to walk and perform own self-care. Pt was required increase assist with mobility and ADLs within the last few months due to fatigue and generalized weakness. Son assists pt with walking using B HHA assist.   Bath: walkin shower; seat present  DME: rolling walker, shower chair and wheelchair , but pt does not use    Roles/responsibilities: mother, friend, sister    Assist available upon d/c: son able to assist 24/7       Objective:     Patient found with: telemetry, gipson catheter, peripheral IV     General Precautions: Standard, aspiration, fall, NPO   Orthopedic Precautions:N/A   Braces: N/A     Exams:  Pt oriented x Not oriented to time.     Communication: clear/fluent; soft spoken and requires increase time to process  Follows Commands: Follows one-step commands    Posture: Rounded shoulder, Head forward and Posterior pelvic tilt  Skin Integrity: Visible skin intact  Edema:  none    Range of Motion and Strength Examination    Right Lower Extremity: WFL    Left Lower Extremity: WFL    Gross Manual Muscle Testing     Right LE Left LE   Hip Abd/Add 2/5 2/5   Hip Flexion 2/5 2/5   Knee extension 2/5 2/5   DF/PF 2/5 2/5     Only able to test gravity eliminated this visit due to possible GI bleed upon bed mobility assessment      Gross Motor Coordination:  Impaired      Functional Mobility:    Bed Mobility:  Pt able to assist with LE's to EOB, but unable to  complete supine to sit transfer due to discovery of bloody stool.   Pt remained on side and RN notified. Will reassess mobility next visit.       Education:  Education provided to pt regarding: PT role/POC; safety with mobility. Verbalized understanding.       AM-PAC 6 CLICK MOBILITY  Total Score:10       Patient left left sidelying with all lines intact, call button in reach and RN present.    GOALS:    Physical Therapy Goals        Problem: Physical Therapy Goal    Goal Priority Disciplines Outcome Goal Variances Interventions   Physical Therapy Goal     PT/OT, PT      Description:  Goals to be met by: 3/9/18     Patient will increase functional independence with mobility by performin. Supine to sit with Maximum Assistance  2. Sit to supine with Maximum Assistance  3. Sit to stand transfer with Moderate Assistance  4. Bed to chair transfer with Moderate Assistance using most appropriate transfer and AD.   5. Gait  x 20 feet with Moderate Assistance using Rolling Walker.   6. Lower extremity exercise program x10 reps with assistance as needed for strengthening and endurance.                       History:     Past Medical History:   Diagnosis Date    Hyperlipidemia     Hypertension     Stroke 2017    residual deficits are ambulates on her own with a limp, but has limited use of her right arm; emotional lability    Vascular dementia     since 2017 stroke       History reviewed. No pertinent surgical history.    Clinical Decision Making:     Personal factors/comorbidities: HLD; HTN; h/o stroke; vascular dementia. The listed co-morbidities and personal factors impact pt's current level and progress with functional mobility and independence.   Body systems elements affected: lower extremities, upper extremities, trunk, musculoskeletal system, neuromuscular system, orientation/level of consciousness  Impairments: see assessment  Clinical Presentation: evolving  Functional Outcome Tools: AMPAC, MMT,  ROM  Evaluation Complexity Level: mod    Time Tracking:     PT Received On: 02/26/18  PT Start Time: 1047     PT Stop Time: 1102  PT Total Time (min): 15 min     Billable Minutes: Evaluation 15      Janette Glasgow PT, DPT  Pager: [phone]  2/26/2018

## 2018-02-26 NOTE — ASSESSMENT & PLAN NOTE
Likely multifactorial: CKD, iron deficiency,decrease oral intake and acute GI loss.   - H/H 9.5/29.8  - No occult blood noted since the transfer  - Will continue to monitor; no GI consult required at this time   - Transfuse if hgb < 7

## 2018-02-26 NOTE — PLAN OF CARE
Problem: Patient Care Overview  Goal: Plan of Care Review  Outcome: Ongoing (interventions implemented as appropriate)   02/26/18 1728   Coping/Psychosocial   Plan Of Care Reviewed With patient;sibling;son       Problem: Pressure Ulcer Risk (Martell Scale) (Adult,Obstetrics,Pediatric)  Goal: Identify Related Risk Factors and Signs and Symptoms  Related risk factors and signs and symptoms are identified upon initiation of Human Response Clinical Practice Guideline (CPG)   Outcome: Ongoing (interventions implemented as appropriate)   02/26/18 1728   Pressure Ulcer Risk (Martell Scale)   Related Risk Factors (Pressure Ulcer Risk (Martell Scale)) cognitive impairment;excretions/secretions;fluid intake inadequate;medical devices;mental impairment;mobility impaired;nutritional deficiencies       Problem: Gastrointestinal Bleeding (Adult)  Goal: Signs and Symptoms of Listed Potential Problems Will be Absent, Minimized or Managed (Gastrointestinal Bleeding)  Signs and symptoms of listed potential problems will be absent, minimized or managed by discharge/transition of care (reference Gastrointestinal Bleeding (Adult) CPG).   Outcome: Ongoing (interventions implemented as appropriate)   02/26/18 1728   Gastrointestinal Bleeding   Problems Assessed (GI Bleeding) all   Problems Present (GI Bleeding) other (see comments)

## 2018-02-27 PROBLEM — K92.2 GIB (GASTROINTESTINAL BLEEDING): Status: RESOLVED | Noted: 2018-02-25 | Resolved: 2018-02-27

## 2018-02-27 PROBLEM — D69.6 THROMBOCYTOPENIA: Status: ACTIVE | Noted: 2018-02-27

## 2018-02-27 PROBLEM — E87.0 HYPERNATREMIA: Status: RESOLVED | Noted: 2018-02-25 | Resolved: 2018-02-27

## 2018-02-27 PROBLEM — D72.829 LEUKOCYTOSIS: Status: RESOLVED | Noted: 2018-02-25 | Resolved: 2018-02-27

## 2018-02-27 LAB
ALBUMIN SERPL BCP-MCNC: 2 G/DL
ALP SERPL-CCNC: 95 U/L
ALT SERPL W/O P-5'-P-CCNC: 22 U/L
ANION GAP SERPL CALC-SCNC: 10 MMOL/L
ANION GAP SERPL CALC-SCNC: 10 MMOL/L
ANION GAP SERPL CALC-SCNC: 8 MMOL/L
ANION GAP SERPL CALC-SCNC: 9 MMOL/L
ANISOCYTOSIS BLD QL SMEAR: SLIGHT
ANISOCYTOSIS BLD QL SMEAR: SLIGHT
AST SERPL-CCNC: 47 U/L
BASOPHILS # BLD AUTO: 0.01 K/UL
BASOPHILS # BLD AUTO: 0.02 K/UL
BASOPHILS NFR BLD: 0.1 %
BASOPHILS NFR BLD: 0.2 %
BILIRUB SERPL-MCNC: 1 MG/DL
BUN SERPL-MCNC: 19 MG/DL
BUN SERPL-MCNC: 20 MG/DL
BUN SERPL-MCNC: 23 MG/DL
BUN SERPL-MCNC: 23 MG/DL
CALCIUM SERPL-MCNC: 8.5 MG/DL
CALCIUM SERPL-MCNC: 8.7 MG/DL
CHLORIDE SERPL-SCNC: 109 MMOL/L
CHLORIDE SERPL-SCNC: 110 MMOL/L
CHLORIDE SERPL-SCNC: 111 MMOL/L
CHLORIDE SERPL-SCNC: 111 MMOL/L
CO2 SERPL-SCNC: 24 MMOL/L
CO2 SERPL-SCNC: 24 MMOL/L
CO2 SERPL-SCNC: 25 MMOL/L
CO2 SERPL-SCNC: 26 MMOL/L
CREAT SERPL-MCNC: 0.8 MG/DL
CREAT SERPL-MCNC: 1 MG/DL
CREAT SERPL-MCNC: 1.1 MG/DL
CREAT SERPL-MCNC: 1.1 MG/DL
DIFFERENTIAL METHOD: ABNORMAL
DIFFERENTIAL METHOD: ABNORMAL
EOSINOPHIL # BLD AUTO: 0 K/UL
EOSINOPHIL # BLD AUTO: 0 K/UL
EOSINOPHIL NFR BLD: 0.2 %
EOSINOPHIL NFR BLD: 0.2 %
ERYTHROCYTE [DISTWIDTH] IN BLOOD BY AUTOMATED COUNT: 14.7 %
ERYTHROCYTE [DISTWIDTH] IN BLOOD BY AUTOMATED COUNT: 14.9 %
EST. GFR  (AFRICAN AMERICAN): >60 ML/MIN/1.73 M^2
EST. GFR  (NON AFRICAN AMERICAN): 54.3 ML/MIN/1.73 M^2
EST. GFR  (NON AFRICAN AMERICAN): 54.3 ML/MIN/1.73 M^2
EST. GFR  (NON AFRICAN AMERICAN): >60 ML/MIN/1.73 M^2
EST. GFR  (NON AFRICAN AMERICAN): >60 ML/MIN/1.73 M^2
GLUCOSE SERPL-MCNC: 76 MG/DL
GLUCOSE SERPL-MCNC: 86 MG/DL
GLUCOSE SERPL-MCNC: 98 MG/DL
GLUCOSE SERPL-MCNC: 98 MG/DL
HCT VFR BLD AUTO: 29.4 %
HCT VFR BLD AUTO: 30.9 %
HGB BLD-MCNC: 9.5 G/DL
HGB BLD-MCNC: 9.9 G/DL
IMM GRANULOCYTES # BLD AUTO: 0.08 K/UL
IMM GRANULOCYTES # BLD AUTO: 0.1 K/UL
IMM GRANULOCYTES NFR BLD AUTO: 0.9 %
IMM GRANULOCYTES NFR BLD AUTO: 1 %
LYMPHOCYTES # BLD AUTO: 1 K/UL
LYMPHOCYTES # BLD AUTO: 1.1 K/UL
LYMPHOCYTES NFR BLD: 10.1 %
LYMPHOCYTES NFR BLD: 10.2 %
MAGNESIUM SERPL-MCNC: 1.7 MG/DL
MCH RBC QN AUTO: 30.2 PG
MCH RBC QN AUTO: 30.3 PG
MCHC RBC AUTO-ENTMCNC: 32 G/DL
MCHC RBC AUTO-ENTMCNC: 32.3 G/DL
MCV RBC AUTO: 93 FL
MCV RBC AUTO: 95 FL
MONOCYTES # BLD AUTO: 0.4 K/UL
MONOCYTES # BLD AUTO: 0.4 K/UL
MONOCYTES NFR BLD: 4.1 %
MONOCYTES NFR BLD: 4.3 %
NEUTROPHILS # BLD AUTO: 8 K/UL
NEUTROPHILS # BLD AUTO: 8.7 K/UL
NEUTROPHILS NFR BLD: 84.1 %
NEUTROPHILS NFR BLD: 84.6 %
NRBC BLD-RTO: 0 /100 WBC
NRBC BLD-RTO: 0 /100 WBC
PHOSPHATE SERPL-MCNC: 2.4 MG/DL
PLATELET # BLD AUTO: 120 K/UL
PLATELET # BLD AUTO: ABNORMAL K/UL
PLATELET BLD QL SMEAR: ABNORMAL
PLATELET BLD QL SMEAR: ABNORMAL
PMV BLD AUTO: 11.5 FL
PMV BLD AUTO: 11.6 FL
POTASSIUM SERPL-SCNC: 3.2 MMOL/L
POTASSIUM SERPL-SCNC: 3.2 MMOL/L
POTASSIUM SERPL-SCNC: 3.8 MMOL/L
POTASSIUM SERPL-SCNC: 3.8 MMOL/L
PROT SERPL-MCNC: 6.1 G/DL
RBC # BLD AUTO: 3.15 M/UL
RBC # BLD AUTO: 3.27 M/UL
SODIUM SERPL-SCNC: 143 MMOL/L
SODIUM SERPL-SCNC: 144 MMOL/L
SODIUM SERPL-SCNC: 145 MMOL/L
SODIUM SERPL-SCNC: 145 MMOL/L
WBC # BLD AUTO: 10.35 K/UL
WBC # BLD AUTO: 9.4 K/UL

## 2018-02-27 PROCEDURE — 92526 ORAL FUNCTION THERAPY: CPT

## 2018-02-27 PROCEDURE — 63600175 PHARM REV CODE 636 W HCPCS: Performed by: STUDENT IN AN ORGANIZED HEALTH CARE EDUCATION/TRAINING PROGRAM

## 2018-02-27 PROCEDURE — A4216 STERILE WATER/SALINE, 10 ML: HCPCS | Performed by: STUDENT IN AN ORGANIZED HEALTH CARE EDUCATION/TRAINING PROGRAM

## 2018-02-27 PROCEDURE — 25000003 PHARM REV CODE 250: Performed by: INTERNAL MEDICINE

## 2018-02-27 PROCEDURE — 85025 COMPLETE CBC W/AUTO DIFF WBC: CPT

## 2018-02-27 PROCEDURE — 11000001 HC ACUTE MED/SURG PRIVATE ROOM

## 2018-02-27 PROCEDURE — 99232 SBSQ HOSP IP/OBS MODERATE 35: CPT | Mod: ,,, | Performed by: HOSPITALIST

## 2018-02-27 PROCEDURE — 83735 ASSAY OF MAGNESIUM: CPT

## 2018-02-27 PROCEDURE — 36415 COLL VENOUS BLD VENIPUNCTURE: CPT

## 2018-02-27 PROCEDURE — 80053 COMPREHEN METABOLIC PANEL: CPT

## 2018-02-27 PROCEDURE — 80048 BASIC METABOLIC PNL TOTAL CA: CPT

## 2018-02-27 PROCEDURE — 84100 ASSAY OF PHOSPHORUS: CPT

## 2018-02-27 PROCEDURE — 63600175 PHARM REV CODE 636 W HCPCS

## 2018-02-27 PROCEDURE — 97530 THERAPEUTIC ACTIVITIES: CPT

## 2018-02-27 PROCEDURE — 25000003 PHARM REV CODE 250: Performed by: STUDENT IN AN ORGANIZED HEALTH CARE EDUCATION/TRAINING PROGRAM

## 2018-02-27 RX ORDER — POTASSIUM CHLORIDE 7.45 MG/ML
INJECTION INTRAVENOUS
Status: COMPLETED
Start: 2018-02-27 | End: 2018-02-27

## 2018-02-27 RX ORDER — SODIUM CHLORIDE, SODIUM LACTATE, POTASSIUM CHLORIDE, CALCIUM CHLORIDE 600; 310; 30; 20 MG/100ML; MG/100ML; MG/100ML; MG/100ML
INJECTION, SOLUTION INTRAVENOUS CONTINUOUS
Status: DISCONTINUED | OUTPATIENT
Start: 2018-02-27 | End: 2018-02-28

## 2018-02-27 RX ORDER — POTASSIUM CHLORIDE 7.45 MG/ML
10 INJECTION INTRAVENOUS
Status: COMPLETED | OUTPATIENT
Start: 2018-02-27 | End: 2018-02-28

## 2018-02-27 RX ADMIN — Medication 3 ML: at 05:02

## 2018-02-27 RX ADMIN — POTASSIUM CHLORIDE 10 MEQ: 10 INJECTION, SOLUTION INTRAVENOUS at 11:02

## 2018-02-27 RX ADMIN — SODIUM CHLORIDE, SODIUM LACTATE, POTASSIUM CHLORIDE, AND CALCIUM CHLORIDE: .6; .31; .03; .02 INJECTION, SOLUTION INTRAVENOUS at 09:02

## 2018-02-27 RX ADMIN — POTASSIUM CHLORIDE 10 MEQ: 10 INJECTION, SOLUTION INTRAVENOUS at 12:02

## 2018-02-27 RX ADMIN — Medication 3 ML: at 09:02

## 2018-02-27 NOTE — PHARMACY MED REC
"Admission Medication Reconciliation - Pharmacy Consult Note    The home medication history was taken by Augusta Quach, Pharmacy Technician.  Based on information gathered and subsequent review by the clinical pharmacist, the items below may need attention.    You may go to "Admission" then "Reconcile Home Medications" tabs to review and/or act upon these items.    No issues noted with the medication reconciliation.    Potential issues to be addressed PRIOR TO DISCHARGE  o Prescriptions last filled 12/14/17 for 30-day supply    Please address this information as you see fit.  Feel free to contact us if you have any questions or require assistance.    Kimberly Bishop, PharmD  [id number]    Patient's prior to admission medication regimen was as follows:  Medication Sig    atorvastatin (LIPITOR) 40 MG tablet Take 40 mg by mouth once daily.     losartan-hydrochlorothiazide 100-25 mg (HYZAAR) 100-25 mg per tablet Take 1 tablet by mouth once daily.     potassium chloride (K-TAB) 20 mEq Take 20 mEq by mouth once daily.          .    .          "

## 2018-02-27 NOTE — PLAN OF CARE
Problem: SLP Goal  Goal: SLP Goal  Speech Therapy Short Term Goals  Goal expected to be met by 3/5  1. Pt will tolerate puree diet and thin liquids with no overt s/s of aspiration noted.   2. Pt will participate in an ongoing assessment to determine the least restrictive and safest po diet.      Pt with significant swallow delay. Recommend continue current diet with strict aspiration precautions. Son at bedside states this is how pt ate at home also.   Mary Aadms CCC-SLP  2/27/2018

## 2018-02-27 NOTE — ASSESSMENT & PLAN NOTE
- Etiology likely pseudothrombocytopenia given platelet clumping and significant improvement with non-EDTA containing tubes  - B12/folate normal earlier in this admission  - Not currently on any medications associated with thrombocytopenia

## 2018-02-27 NOTE — ASSESSMENT & PLAN NOTE
- Improving  - Vascular dementia without behavioral disturbance at baseline, worsening in the context of NPH.  - Neurosurgery consulted: No acute intervention at this time. Suggest to follow up w/ the NSGY at the OSH. They would prefer to see NSGY here as an outpatient upon discharge.  - Neurology consulted:  No further neurology evaluations necessary   - Hypernatremia likely contributing to AMS; improving with resolved.  - TSH 0.247/B12 >2000/FOLATE 44/RPR negative  - Toxic screen negative  - Will likely tap tomorrow with LP for diagnostic and therapeutic purposes

## 2018-02-27 NOTE — PT/OT/SLP PROGRESS
Physical Therapy Treatment    Patient Name:  Lisbeth Seaman   MRN:  [id number]    Recommendations:     Discharge Recommendations:  nursing facility, skilled (pending progress)  Discharge Equipment Recommendations: none   Barriers to discharge: None ramp access; son able to care for patient    Assessment:     Lisbeth Seaman is a 63 y.o. female admitted with a medical diagnosis of Encephalopathy, metabolic.  She presents with the following impairments/functional limitations:  weakness, impaired endurance, impaired functional mobilty, impaired self care skills, impaired cognition, decreased upper extremity function, decreased lower extremity function. Pt tolerated treatment fairly well, and will continue to benefit from PT services. Continue with PT POC as indicated.     Rehab Prognosis:  good; patient would benefit from acute skilled PT services to address these deficits and reach maximum level of function.      Recent Surgery: * No surgery found *      Plan:     During this hospitalization, patient to be seen 4 x/week to address the above listed problems via gait training, therapeutic activities, therapeutic exercises, neuromuscular re-education  · Plan of Care Expires:  03/28/18   Plan of Care Reviewed with: patient    Subjective     Communicated with nursing prior to session.  Patient found supine upon PT entry to room, agreeable to treatment.      Chief Complaint: none stated  Patient comments/goals: none stated  Pain/Comfort:  · Pain Rating 1: 0/10  · Pain Rating Post-Intervention 1: 0/10    Patients cultural, spiritual, [employer] conflicts given the current situation: none stated    Objective:     Patient found with:  (all lines intact)     General Precautions: Standard, fall, aspiration   Orthopedic Precautions:N/A   Braces: N/A     Functional Mobility:  · Bed Mobility:  Scooting: total assistance  · Supine to Sit: maximal assistance  · Sit to Supine: moderate assistance  · Transfers:  Sit to Stand: x2 trials  to/from EOB maximal assistance no AD      AM-PAC 6 CLICK MOBILITY  Turning over in bed (including adjusting bedclothes, sheets and blankets)?: 2  Sitting down on and standing up from a chair with arms (e.g., wheelchair, bedside commode, etc.): 2  Moving from lying on back to sitting on the side of the bed?: 2  Moving to and from a bed to a chair (including a wheelchair)?: 2  Need to walk in hospital room?: 1  Climbing 3-5 steps with a railing?: 1  Total Score: 10       Therapeutic Activities and Exercises:   B LE therex x10 reps with max assist to improve B LE strength and endurance.    Pt sat EOB ~8 min with Min-Mod (A)    Patient left supine with all lines intact, call button in reach and bed alarm on..    GOALS:    Physical Therapy Goals        Problem: Physical Therapy Goal    Goal Priority Disciplines Outcome Goal Variances Interventions   Physical Therapy Goal     PT/OT, PT      Description:  Goals to be met by: 3/9/18     Patient will increase functional independence with mobility by performin. Supine to sit with Maximum Assistance  2. Sit to supine with Maximum Assistance  3. Sit to stand transfer with Moderate Assistance  4. Bed to chair transfer with Moderate Assistance using most appropriate transfer and AD.   5. Gait  x 20 feet with Moderate Assistance using Rolling Walker.   6. Lower extremity exercise program x10 reps with assistance as needed for strengthening and endurance.                       Time Tracking:     PT Received On: 18  PT Start Time: 1022     PT Stop Time: 1040  PT Total Time (min): 18 min     Billable Minutes: Therapeutic Activity 18    Treatment Type: Treatment  PT/PTA: PTA     PTA Visit Number: 1     Jane Malave PTA  2018

## 2018-02-27 NOTE — PROGRESS NOTES
"[facility] Hospital Medicine  Progress Note    Patient Name: Lisbeth Seaman  MRN: [id number]  Patient Class: IP- Inpatient   Admission Date: 2/25/2018  Length of Stay: 2 days  Attending Physician: Ezekiel Boyer MD  Primary Care Provider: Tamara Roldan MD    [facility] Medicine Team: [facility] Rohit Clemens MD    Subjective:     Principal Problem:Encephalopathy, metabolic    HPI:  63 M with h/o HTN, HLD, stroke 9/2017 and since then patient functional status has been declining.She is not walking at all, not eating and unable to work,developed some dementia associated with this most recent stroke. She is no longer able to manage her own finances. She also requires assistance with houshold chores like cooking and cleaning. She is able to feed, dress and bath herself. Since the stroke she has had difficulty controlling her emotions and will cry or laugh for no apparent reason.Her son how is her primary caregiver, brought her to her PCP, MRI head was done, concerning for NPH, referred to  neurosurgery which planning to do "stent" some timed this week per son. But due to sudden GI bleed yesterday 2/24 patient was brought to ED at [facility].  No significant drop on H/H, received 1L NS for pre renal ADITHYA  And transferred to [facility] for neurology/nurosergey evaluation.         Hospital Course:  Admitted to -1, neurology/neurosurgery consulted, will follow their recs, patient has leukocytosis, septic work up sent, H/H stable, no GI bleed since admission.   02/26 Pt able to respond to some questions and able to follow some commands. Continuing w/ IVF w/ improving hypernatremia. Will likely require SNF placement after d/c; PPD placed on 2/26 02/27 Hypernatremia resolved. Mentation improving but not yet returned to baseline. Evaluating thrombocytopenia, likely pseudothrombocytopenia    Interval History:     Ms. Seaman did well overnight. Her mentation has " improved, though she is not yet at baseline. She did well with SLP, so we are starting a diet. Her hypernatremia has improved significantly, for which we are transitioning her off the d5 water    Review of Systems   Unable to perform ROS: Dementia     Objective:     Vital Signs (Most Recent):  Temp: 97.9 °F (36.6 °C) (02/27/18 1140)  Pulse: 83 (02/27/18 1140)  Resp: 18 (02/27/18 1140)  BP: (!) 100/56 (02/27/18 1140)  SpO2: 96 % (02/27/18 0400) Vital Signs (24h Range):  Temp:  [97.9 °F (36.6 °C)-98.9 °F (37.2 °C)] 97.9 °F (36.6 °C)  Pulse:  [75-92] 83  Resp:  [16-19] 18  SpO2:  [96 %-98 %] 96 %  BP: ()/(51-98) 100/56     Weight: 44.9 kg (98 lb 15.8 oz)  Body mass index is 16.99 kg/m².    Intake/Output Summary (Last 24 hours) at 02/27/18 1444  Last data filed at 02/27/18 0600   Gross per 24 hour   Intake             1350 ml   Output              800 ml   Net              550 ml      Physical Exam   Constitutional: She appears well-developed and well-nourished. No distress.   Appears older than stated age   HENT:   Head: Normocephalic and atraumatic.   Eyes: Conjunctivae and EOM are normal. Pupils are equal, round, and reactive to light.   Neck: Normal range of motion. Neck supple. No JVD present.   Cardiovascular: Normal rate and regular rhythm.  Exam reveals no gallop and no friction rub.    No murmur heard.  Pulmonary/Chest: Effort normal and breath sounds normal. No respiratory distress. She has no wheezes.   Abdominal: Soft. Bowel sounds are normal. She exhibits no distension and no mass. There is no tenderness.   Musculoskeletal: Normal range of motion. She exhibits no edema or tenderness.   Neurological: She is alert. She displays normal reflexes. No cranial nerve deficit.   Oriented to self only   Skin: Skin is warm and dry. No rash noted. No erythema.   Psychiatric: She has a normal mood and affect. Her behavior is normal.       Significant Labs:   CBC:    Recent Labs  Lab 02/26/18 2003 02/27/18  4640  02/27/18  0814   WBC 9.87 10.35 9.40   GRAN 85.4*  8.4* 84.1*  8.7* 84.6*  8.0*   HGB 9.0* 9.5* 9.9*   HCT 28.4* 29.4* 30.9*   * SEE COMMENT 120*       Chem 10:    Recent Labs  Lab 02/26/18 0429 02/26/18 2003 02/27/18 0405 02/27/18  1235   *  < > 146* 145  145 143   K 3.2*  < > 3.1* 3.8  3.8 3.2*   *  < > 112* 111*  111* 109   CO2 24  < > 23 24  24 26   BUN 40*  < > 28* 23  23 20   CREATININE 1.2  < > 1.0 1.1  1.1 1.0   *  < > 118* 98  98 86   CALCIUM 8.7  < > 8.1* 8.7  8.7 8.7   MG 1.8  --   --  1.7  --    PHOS 2.4*  --   --  2.4*  --    < > = values in this interval not displayed.    LFTs:    Recent Labs  Lab 02/26/18 0429 02/27/18 0405   ALKPHOS 84 95   BILITOT 0.9 1.0   AST 30 47*   ALT 16 22   ALBUMIN 2.1* 2.0*       Significant Imaging:   None new      Assessment/Plan:      * Encephalopathy, metabolic    - Improving  - Vascular dementia without behavioral disturbance at baseline, worsening in the context of NPH.  - Neurosurgery consulted: No acute intervention at this time. Suggest to follow up w/ the NSGY at the OSH. They would prefer to see NSGY here as an outpatient upon discharge.  - Neurology consulted:  No further neurology evaluations necessary   - Hypernatremia likely contributing to AMS; improving with resolved.  - TSH 0.247/B12 >2000/FOLATE 44/RPR negative  - Toxic screen negative  - Will likely tap tomorrow with LP for diagnostic and therapeutic purposes              Thrombocytopenia    - Etiology likely pseudothrombocytopenia given platelet clumping and significant improvement with non-EDTA containing tubes  - B12/folate normal earlier in this admission  - Not currently on any medications associated with thrombocytopenia        Urinary retention    - Unclear etiology  - Iniguez in place; will continue to monitor for resolution           Hypophosphatemia    - Stable, up to 2.4  - Starting feeds today which should help  - Will follow daily        Hypokalemia     - Replaced, following daily        NPH (normal pressure hydrocephalus)    - Stable  - No surgical intervention while inpatient  - Therapeutic/diagnostic LP inpatient tomorrow as described above  - Will refer to NSGY upon discharge for shunt eval        Hypertension, essential    - Stable  - Currently not taking any meds due to mental statue decline  - Will monitor and start med if uncontrolled .         Anemia    - Stable  - Etiology likely multifactorial with contribution from CKD, iron deficiency, and decrease oral intake  - No evidence of GI loss as described prior to transfer; will continue with mechanical DVT PPX to be safe  - Hemoglobin 9.9 today  - Will follow with daily CBC and transfuse to maintain Hgb > 7            CKD (chronic kidney disease), stage III    - Stable  - Renally dose meds  - Cr stable at 1.0  - Will follow with I/Os and daily BMPs        Hyperlipidemia    Currently not taking meds  Per son: used to take statin         Vascular dementia without behavioral disturbance    - No clear baseline as patient is currently altered          VTE Risk Mitigation         Ordered     High Risk of VTE  Once      02/25/18 0816     Place sequential compression device  Until discontinued      02/25/18 0816          Rohit Clemens MD  Department of Hospital Medicine   [facility]

## 2018-02-27 NOTE — PLAN OF CARE
Problem: Physical Therapy Goal  Goal: Physical Therapy Goal  Goals to be met by: 3/9/18     Patient will increase functional independence with mobility by performin. Supine to sit with Maximum Assistance  2. Sit to supine with Maximum Assistance  3. Sit to stand transfer with Moderate Assistance  4. Bed to chair transfer with Moderate Assistance using most appropriate transfer and AD.   5. Gait  x 20 feet with Moderate Assistance using Rolling Walker.   6. Lower extremity exercise program x10 reps with assistance as needed for strengthening and endurance.      Goals remain appropriate at time. Continue with PT POC as indicated.

## 2018-02-27 NOTE — SUBJECTIVE & OBJECTIVE
Interval History:     Ms. Seaman did well overnight. Her mentation has improved, though she is not yet at baseline. She did well with SLP, so we are starting a diet. Her hypernatremia has improved significantly, for which we are transitioning her off the d5 water    Review of Systems   Unable to perform ROS: Dementia     Objective:     Vital Signs (Most Recent):  Temp: 97.9 °F (36.6 °C) (02/27/18 1140)  Pulse: 83 (02/27/18 1140)  Resp: 18 (02/27/18 1140)  BP: (!) 100/56 (02/27/18 1140)  SpO2: 96 % (02/27/18 0400) Vital Signs (24h Range):  Temp:  [97.9 °F (36.6 °C)-98.9 °F (37.2 °C)] 97.9 °F (36.6 °C)  Pulse:  [75-92] 83  Resp:  [16-19] 18  SpO2:  [96 %-98 %] 96 %  BP: ()/(51-98) 100/56     Weight: 44.9 kg (98 lb 15.8 oz)  Body mass index is 16.99 kg/m².    Intake/Output Summary (Last 24 hours) at 02/27/18 1444  Last data filed at 02/27/18 0600   Gross per 24 hour   Intake             1350 ml   Output              800 ml   Net              550 ml      Physical Exam   Constitutional: She appears well-developed and well-nourished. No distress.   Appears older than stated age   HENT:   Head: Normocephalic and atraumatic.   Eyes: Conjunctivae and EOM are normal. Pupils are equal, round, and reactive to light.   Neck: Normal range of motion. Neck supple. No JVD present.   Cardiovascular: Normal rate and regular rhythm.  Exam reveals no gallop and no friction rub.    No murmur heard.  Pulmonary/Chest: Effort normal and breath sounds normal. No respiratory distress. She has no wheezes.   Abdominal: Soft. Bowel sounds are normal. She exhibits no distension and no mass. There is no tenderness.   Musculoskeletal: Normal range of motion. She exhibits no edema or tenderness.   Neurological: She is alert. She displays normal reflexes. No cranial nerve deficit.   Oriented to self only   Skin: Skin is warm and dry. No rash noted. No erythema.   Psychiatric: She has a normal mood and affect. Her behavior is normal.        Significant Labs:   CBC:    Recent Labs  Lab 02/26/18 2003 02/27/18 0405 02/27/18  0814   WBC 9.87 10.35 9.40   GRAN 85.4*  8.4* 84.1*  8.7* 84.6*  8.0*   HGB 9.0* 9.5* 9.9*   HCT 28.4* 29.4* 30.9*   * SEE COMMENT 120*       Chem 10:    Recent Labs  Lab 02/26/18 0429 02/26/18 2003 02/27/18 0405 02/27/18  1235   *  < > 146* 145  145 143   K 3.2*  < > 3.1* 3.8  3.8 3.2*   *  < > 112* 111*  111* 109   CO2 24  < > 23 24  24 26   BUN 40*  < > 28* 23  23 20   CREATININE 1.2  < > 1.0 1.1  1.1 1.0   *  < > 118* 98  98 86   CALCIUM 8.7  < > 8.1* 8.7  8.7 8.7   MG 1.8  --   --  1.7  --    PHOS 2.4*  --   --  2.4*  --    < > = values in this interval not displayed.    LFTs:    Recent Labs  Lab 02/26/18 0429 02/27/18 0405   ALKPHOS 84 95   BILITOT 0.9 1.0   AST 30 47*   ALT 16 22   ALBUMIN 2.1* 2.0*       Significant Imaging:   None new

## 2018-02-27 NOTE — ASSESSMENT & PLAN NOTE
- Stable  - Currently not taking any meds due to mental statue decline  - Will monitor and start med if uncontrolled .

## 2018-02-27 NOTE — PT/OT/SLP PROGRESS
"Speech Language Pathology Treatment    Patient Name:  Lisbeth Seaman   MRN:  [id number]  Admitting Diagnosis: Encephalopathy, metabolic    Recommendations:                 General Recommendations:  Dysphagia therapy  Diet recommendations:  Puree, Liquid Diet Level: Thin   · 1 bite/sip at a time,   · Alternating bites/sips,   · Assistance with meals, '  · Check for pocketing/oral residue,   · Feed only when awake/alert,   · HOB to 90 degrees,   · Meds crushed in puree,   · Monitor for s/s of aspiration,   · Remain upright 30 minutes post meal,  · Small bites/sips and   · Strict aspiration precautions   General Precautions: Standard, fall, aspiration  Communication strategies:  provide increased time to answer    Subjective     Awake/alert, son at bedside    Pain/Comfort:  · Pain Rating 1: 0/10  · Pain Rating Post-Intervention 1: 0/10    Objective:     Has the patient been evaluated by SLP for swallowing?   Yes  Keep patient NPO? No   Current Respiratory Status: room air      Pt repositioned upright in bed for ongoing swallow assessment. Pt required increased time and encouragement to respond throughout session. Pt's son at bedside stated " It takes her ~45 minutes to eat a pancake at home, but she gets it down." referring to pt's slow oral transit throughout PO trials. Pt tolerated thin liquids via straw x3 and and puree x2 with oral holding and delayed swallow initiation. Pt initiated swallow provided verbal cues and increased time. Recommend continue puree diet/thin liquids with strict aspiration precautions. Monitor for s/s of aspiration.     Assessment:     Lisbeth Seaman is a 63 y.o. female with an SLP diagnosis of Dysphagia.      Goals:    SLP Goals        Problem: SLP Goal    Goal Priority Disciplines Outcome   SLP Goal     SLP Ongoing (interventions implemented as appropriate)   Description:  Speech Therapy Short Term Goals  Goal expected to be met by 3/5  1. Pt will tolerate puree diet and thin liquids with " no overt s/s of aspiration noted.   2. Pt will participate in an ongoing assessment to determine the least restrictive and safest po diet.                       Plan:     · Patient to be seen:  4 x/week   · Plan of Care expires:  03/27/18  · Plan of Care reviewed with:  patient, son   · SLP Follow-Up:  Yes       Discharge recommendations:  nursing facility, skilled       Time Tracking:     SLP Treatment Date:   02/27/18  Speech Start Time:  1036  Speech Stop Time:  1044     Speech Total Time (min):  8 min    Billable Minutes: Treatment Swallowing Dysfunction 8    Mary Adams CCC-SLP  02/27/2018

## 2018-02-27 NOTE — ASSESSMENT & PLAN NOTE
- Stable  - No surgical intervention while inpatient  - Therapeutic/diagnostic LP inpatient tomorrow as described above  - Will refer to NSGY upon discharge for shunt eval

## 2018-02-27 NOTE — ASSESSMENT & PLAN NOTE
- Stable  - Etiology likely multifactorial with contribution from CKD, iron deficiency, and decrease oral intake  - No evidence of GI loss as described prior to transfer; will continue with mechanical DVT PPX to be safe  - Hemoglobin 9.9 today  - Will follow with daily CBC and transfuse to maintain Hgb > 7

## 2018-02-28 LAB
ALBUMIN SERPL BCP-MCNC: 1.8 G/DL
ALP SERPL-CCNC: 101 U/L
ALT SERPL W/O P-5'-P-CCNC: 24 U/L
ANION GAP SERPL CALC-SCNC: 8 MMOL/L
ANION GAP SERPL CALC-SCNC: 8 MMOL/L
AST SERPL-CCNC: 45 U/L
BASOPHILS # BLD AUTO: 0.01 K/UL
BASOPHILS NFR BLD: 0.1 %
BILIRUB SERPL-MCNC: 0.8 MG/DL
BUN SERPL-MCNC: 17 MG/DL
BUN SERPL-MCNC: 17 MG/DL
CALCIUM SERPL-MCNC: 8.6 MG/DL
CALCIUM SERPL-MCNC: 8.6 MG/DL
CHLORIDE SERPL-SCNC: 112 MMOL/L
CHLORIDE SERPL-SCNC: 112 MMOL/L
CO2 SERPL-SCNC: 24 MMOL/L
CO2 SERPL-SCNC: 24 MMOL/L
CREAT SERPL-MCNC: 0.8 MG/DL
CREAT SERPL-MCNC: 0.8 MG/DL
DIFFERENTIAL METHOD: ABNORMAL
EOSINOPHIL # BLD AUTO: 0 K/UL
EOSINOPHIL NFR BLD: 0.5 %
ERYTHROCYTE [DISTWIDTH] IN BLOOD BY AUTOMATED COUNT: 15 %
EST. GFR  (AFRICAN AMERICAN): >60 ML/MIN/1.73 M^2
EST. GFR  (AFRICAN AMERICAN): >60 ML/MIN/1.73 M^2
EST. GFR  (NON AFRICAN AMERICAN): >60 ML/MIN/1.73 M^2
EST. GFR  (NON AFRICAN AMERICAN): >60 ML/MIN/1.73 M^2
GLUCOSE SERPL-MCNC: 70 MG/DL
GLUCOSE SERPL-MCNC: 70 MG/DL
HCT VFR BLD AUTO: 28.9 %
HGB BLD-MCNC: 9.5 G/DL
IMM GRANULOCYTES # BLD AUTO: 0.07 K/UL
IMM GRANULOCYTES NFR BLD AUTO: 0.9 %
LYMPHOCYTES # BLD AUTO: 0.7 K/UL
LYMPHOCYTES NFR BLD: 8.1 %
MAGNESIUM SERPL-MCNC: 1.5 MG/DL
MCH RBC QN AUTO: 30.4 PG
MCHC RBC AUTO-ENTMCNC: 32.9 G/DL
MCV RBC AUTO: 93 FL
MONOCYTES # BLD AUTO: 0.5 K/UL
MONOCYTES NFR BLD: 6.2 %
NEUTROPHILS # BLD AUTO: 6.7 K/UL
NEUTROPHILS NFR BLD: 84.2 %
NRBC BLD-RTO: 0 /100 WBC
PHOSPHATE SERPL-MCNC: 1.7 MG/DL
PLATELET # BLD AUTO: 84 K/UL
PMV BLD AUTO: 12.5 FL
POTASSIUM SERPL-SCNC: 3.7 MMOL/L
POTASSIUM SERPL-SCNC: 3.7 MMOL/L
PROT SERPL-MCNC: 5.8 G/DL
RBC # BLD AUTO: 3.12 M/UL
SODIUM SERPL-SCNC: 144 MMOL/L
SODIUM SERPL-SCNC: 144 MMOL/L
TB INDURATION 48 - 72 HR READ: 0 MM
WBC # BLD AUTO: 8.01 K/UL

## 2018-02-28 PROCEDURE — 25000003 PHARM REV CODE 250: Performed by: INTERNAL MEDICINE

## 2018-02-28 PROCEDURE — A4216 STERILE WATER/SALINE, 10 ML: HCPCS | Performed by: STUDENT IN AN ORGANIZED HEALTH CARE EDUCATION/TRAINING PROGRAM

## 2018-02-28 PROCEDURE — 36415 COLL VENOUS BLD VENIPUNCTURE: CPT

## 2018-02-28 PROCEDURE — 80053 COMPREHEN METABOLIC PANEL: CPT

## 2018-02-28 PROCEDURE — 63600175 PHARM REV CODE 636 W HCPCS: Performed by: STUDENT IN AN ORGANIZED HEALTH CARE EDUCATION/TRAINING PROGRAM

## 2018-02-28 PROCEDURE — 84100 ASSAY OF PHOSPHORUS: CPT

## 2018-02-28 PROCEDURE — 85025 COMPLETE CBC W/AUTO DIFF WBC: CPT

## 2018-02-28 PROCEDURE — 83735 ASSAY OF MAGNESIUM: CPT

## 2018-02-28 PROCEDURE — 62270 DX LMBR SPI PNXR: CPT | Mod: ,,, | Performed by: INTERNAL MEDICINE

## 2018-02-28 PROCEDURE — 99233 SBSQ HOSP IP/OBS HIGH 50: CPT | Mod: ,,, | Performed by: HOSPITALIST

## 2018-02-28 PROCEDURE — 92526 ORAL FUNCTION THERAPY: CPT

## 2018-02-28 PROCEDURE — 97530 THERAPEUTIC ACTIVITIES: CPT

## 2018-02-28 PROCEDURE — 25000003 PHARM REV CODE 250: Performed by: STUDENT IN AN ORGANIZED HEALTH CARE EDUCATION/TRAINING PROGRAM

## 2018-02-28 PROCEDURE — 11000001 HC ACUTE MED/SURG PRIVATE ROOM

## 2018-02-28 PROCEDURE — 00JU3ZZ INSPECTION OF SPINAL CANAL, PERCUTANEOUS APPROACH: ICD-10-PCS | Performed by: HOSPITALIST

## 2018-02-28 RX ORDER — SODIUM CHLORIDE 450 MG/100ML
INJECTION, SOLUTION INTRAVENOUS CONTINUOUS
Status: DISCONTINUED | OUTPATIENT
Start: 2018-02-28 | End: 2018-03-03 | Stop reason: HOSPADM

## 2018-02-28 RX ORDER — SODIUM,POTASSIUM PHOSPHATES 280-250MG
1 POWDER IN PACKET (EA) ORAL
Status: COMPLETED | OUTPATIENT
Start: 2018-02-28 | End: 2018-02-28

## 2018-02-28 RX ORDER — LANOLIN ALCOHOL/MO/W.PET/CERES
800 CREAM (GRAM) TOPICAL DAILY
Status: DISCONTINUED | OUTPATIENT
Start: 2018-02-28 | End: 2018-03-01

## 2018-02-28 RX ORDER — LIDOCAINE HYDROCHLORIDE 10 MG/ML
30 INJECTION, SOLUTION EPIDURAL; INFILTRATION; INTRACAUDAL; PERINEURAL ONCE
Status: COMPLETED | OUTPATIENT
Start: 2018-02-28 | End: 2018-02-28

## 2018-02-28 RX ADMIN — POTASSIUM & SODIUM PHOSPHATES POWDER PACK 280-160-250 MG 1 PACKET: 280-160-250 PACK at 08:02

## 2018-02-28 RX ADMIN — POTASSIUM & SODIUM PHOSPHATES POWDER PACK 280-160-250 MG 1 PACKET: 280-160-250 PACK at 12:02

## 2018-02-28 RX ADMIN — SODIUM CHLORIDE: 4.5 INJECTION, SOLUTION INTRAVENOUS at 12:02

## 2018-02-28 RX ADMIN — POTASSIUM CHLORIDE 10 MEQ: 10 INJECTION, SOLUTION INTRAVENOUS at 02:02

## 2018-02-28 RX ADMIN — POTASSIUM CHLORIDE 10 MEQ: 10 INJECTION, SOLUTION INTRAVENOUS at 01:02

## 2018-02-28 RX ADMIN — POTASSIUM CHLORIDE 10 MEQ: 10 INJECTION, SOLUTION INTRAVENOUS at 12:02

## 2018-02-28 RX ADMIN — POTASSIUM & SODIUM PHOSPHATES POWDER PACK 280-160-250 MG 1 PACKET: 280-160-250 PACK at 04:02

## 2018-02-28 RX ADMIN — Medication 3 ML: at 02:02

## 2018-02-28 RX ADMIN — MAGNESIUM OXIDE TAB 400 MG (241.3 MG ELEMENTAL MG) 800 MG: 400 (241.3 MG) TAB at 12:02

## 2018-02-28 RX ADMIN — LIDOCAINE HYDROCHLORIDE 300 MG: 10 INJECTION, SOLUTION EPIDURAL; INFILTRATION; INTRACAUDAL; PERINEURAL at 01:02

## 2018-02-28 RX ADMIN — Medication 3 ML: at 09:02

## 2018-02-28 RX ADMIN — Medication 3 ML: at 05:02

## 2018-02-28 NOTE — PLAN OF CARE
Referral sent to    [facility] [phone]  [facility] [phone]  [facility] [phone]  [facility] [phone]  [facility] [phone]  [facility]. [phone]    Purnima is f/u.

## 2018-02-28 NOTE — PROCEDURES
"Lisbeth Seaman is a 63 y.o. female patient.    Temp: 98.7 °F (37.1 °C) (02/28/18 1230)  Pulse: 86 (02/28/18 1230)  Resp: 18 (02/28/18 1230)  BP: 123/84 (02/28/18 1230)  SpO2: (!) 91 % (02/28/18 1230)  Weight: 44.9 kg (98 lb 15.8 oz) (02/27/18 0500)  Height: 5' 4" (162.6 cm) (02/25/18 1851)       Lumbar Puncture  Date/Time: 2/28/2018 3:50 PM  Location procedure was performed: [facility]  Performed by: ALF KIRK  Authorized by: ALF KIRK   Assisting provider: JARAD AVINA  Pre-operative diagnosis:  Normal pressure hydrocephalus  Post-operative diagnosis: normal pressure hydrocephalus  Consent Done: Yes  Indications: diagnostic and therapeutic for NPH.  Anesthesia: local infiltration    Anesthesia:  Local Anesthetic: lidocaine 1% without epinephrine  Anesthetic total: 8 mL  Patient sedated: no  Lumbar space: L4-L5 interspace  Patient's position: right lateral decubitus  Needle gauge: 18  Number of attempts: 3  Estimated blood loss (mL): 1  Specimens: No  Comments: Failed procedure, attempted at two lumbar levels but was unable to aspirate. Patient tolerated well without any complications.          Alf Kirk  2/28/2018  "

## 2018-02-28 NOTE — PT/OT/SLP PROGRESS
Speech Language Pathology Treatment    Patient Name:  Lisbeth Seaman   MRN:  [id number]   948/948 A    Admitting Diagnosis: Encephalopathy, metabolic    Recommendations:                 General Recommendations:  Dysphagia therapy, Registered Dietician consult  Diet recommendations:  Puree, Liquid Diet Level: Thin   · 1 bite/sip at a time,   · Alternating bites/sips,   · Assistance with meals, '  · Check for pocketing/oral residue,   · Feed only when awake/alert,   · HOB to 90 degrees,   · Meds crushed in puree,   · Monitor for s/s of aspiration,   · Remain upright 30 minutes post meal,  · Small bites/sips and   · Strict aspiration precautions   General Precautions: Standard, fall, aspiration, pureed diet  Communication strategies:  provide increased time to answer    Subjective     Awake/alert. No family present. Breakfast tray at bedside    Pain/Comfort:       Objective:     Has the patient been evaluated by SLP for swallowing?   Yes  Keep patient NPO? No   Current Respiratory Status: room air      Pt repositioned and HOB elevated. Pt required continued increased time and encouragement to respond throughout session, however, improved NESS from initial evaluation. Pt tolerated thin liquids via straw x5 and and puree x5 with oral holding and delayed swallow initiation. Pt initiated swallow provided verbal cues, increased time, and liquid wash. Facial grimacing with all trials. Shrugs when SLP asks if patient does not like taste. Pt nods yes when asking if she is hungry. Recommend continue puree diet/thin liquids with strict aspiration precautions. Monitor for s/s of aspiration. Patient would benefit from a registered dietician consult 2/2 concern for adequate nutrition.     Assessment:     Lisbeth Seaman is a 63 y.o. female with an SLP diagnosis of Dysphagia.      Goals:    SLP Goals        Problem: SLP Goal    Goal Priority Disciplines Outcome   SLP Goal     SLP Ongoing (interventions implemented as appropriate)    Description:  Speech Therapy Short Term Goals  Goal expected to be met by 3/5  1. Pt will tolerate puree diet and thin liquids with no overt s/s of aspiration noted.   2. Pt will participate in an ongoing assessment to determine the least restrictive and safest po diet.                       Plan:     · Patient to be seen:  4 x/week   · Plan of Care expires:  03/27/18  · Plan of Care reviewed with:  patient, son   · SLP Follow-Up:  Yes       Discharge recommendations:  nursing facility, skilled       Time Tracking:     SLP Treatment Date:   02/28/18  Speech Start Time:  0850  Speech Stop Time:  0904     Speech Total Time (min):  14 min    Billable Minutes: Treatment Swallowing Dysfunction 14    FIFI Markham, CCC-SLP  02/28/2018

## 2018-02-28 NOTE — PLAN OF CARE
Called [facility] & spoke to Dana. Does not accept payor for snf.    Called [facility] & spoke to paz. Received referral & will F/U with Zohreh    Called [facility] & spoke to Philippe. Received referral & will F/U with Zohreh    Called [facility] & spoke to Selena. Received referral & will f/u with Zohreh    Called [facility] Krysten no answer

## 2018-02-28 NOTE — PLAN OF CARE
Problem: Patient Care Overview  Goal: Plan of Care Review  Outcome: Ongoing (interventions implemented as appropriate)  Patient had a pleasant night. She had multiple fecal incontinent episodes and does appear to have some incontinent dermatitis. Iniguez intact and draining well. IV replaced due to infiltration. Patient tolerated it well. 4x K riders hung due to hypokalemia. Alert, but confused. VSS stable. Will continue to monitor.

## 2018-02-28 NOTE — PLAN OF CARE
Called [facility] & left message for Veronica.    Still no answer @ [facility]

## 2018-02-28 NOTE — ASSESSMENT & PLAN NOTE
- Stable  - No surgical intervention while inpatient  - Therapeutic/diagnostic LP inpatient as described above  - Will refer to NSGY upon discharge for shunt eval

## 2018-02-28 NOTE — PT/OT/SLP PROGRESS
Physical Therapy   Not Seen Post-LP Note    Lisbeth Seaman   MRN: [id number]     Planned to see patient s/p LP this afternoon for re-assessment. Spoke with MD Clemens, unable to aspirate when attempted LP this afternoon so no need for 2nd visit today.    MD Clemens attempting to set up LP with radiology for Thursday March 1st. Pt will need pre and post-LP assessments/treatments. Will make sure patient is assigned to a PT tomorrow to perform assessments. No afternoon billable time today. I did speak with family at bedside regarding plan to re-attempt LP with pre and post-PT sessions on Thursday, they verbalized understanding and were very appreciative of therapist's time spent with patient.    Zaid Butler, PT  2/28/2018

## 2018-02-28 NOTE — ASSESSMENT & PLAN NOTE
- Stable  - Etiology likely multifactorial with contribution from CKD, iron deficiency, and decrease oral intake  - No evidence of GI loss as described prior to transfer; will continue with mechanical DVT PPX to be safe  - Hemoglobin 9.5 today, stable   - Will follow with daily CBC and transfuse to maintain Hgb > 7

## 2018-02-28 NOTE — SUBJECTIVE & OBJECTIVE
Interval History:   Pt continues to improve as her metabolic derangements resolve; approaching baseline. Will plan for a therapeutic LP this afternoon pending PT evaluation set up. Continues to be fecally incontinent w/ urinary retention requiring a gipson. PO intake is good and patient w/ no other concerns at this time.     Review of Systems   Unable to perform ROS: Dementia   Constitutional: Positive for activity change.   Gastrointestinal: Negative for abdominal distention.        Fecal incontinence    Genitourinary:        Urinary retention   Musculoskeletal: Positive for gait problem.   Neurological: Negative for facial asymmetry.   Psychiatric/Behavioral: Positive for confusion.     Objective:     Vital Signs (Most Recent):  Temp: 97.8 °F (36.6 °C) (02/28/18 0755)  Pulse: 89 (02/28/18 0755)  Resp: 20 (02/28/18 0755)  BP: 97/62 (02/28/18 0755)  SpO2: 98 % (02/28/18 0755) Vital Signs (24h Range):  Temp:  [96.9 °F (36.1 °C)-98 °F (36.7 °C)] 97.8 °F (36.6 °C)  Pulse:  [80-89] 89  Resp:  [18-20] 20  SpO2:  [94 %-98 %] 98 %  BP: ()/(56-71) 97/62     Weight: 44.9 kg (98 lb 15.8 oz)  Body mass index is 16.99 kg/m².    Intake/Output Summary (Last 24 hours) at 02/28/18 1123  Last data filed at 02/28/18 0451   Gross per 24 hour   Intake          1443.75 ml   Output             1050 ml   Net           393.75 ml      Physical Exam   Constitutional: She appears well-developed and well-nourished. No distress.   Appears older than stated age   HENT:   Head: Normocephalic and atraumatic.   Eyes: Conjunctivae and EOM are normal. Pupils are equal, round, and reactive to light.   Neck: Normal range of motion. Neck supple. No JVD present.   Cardiovascular: Normal rate and regular rhythm.  Exam reveals no gallop and no friction rub.    No murmur heard.  Pulmonary/Chest: Effort normal and breath sounds normal. No respiratory distress. She has no wheezes.   Abdominal: Soft. Bowel sounds are normal. She exhibits no distension and  no mass. There is no tenderness.   Musculoskeletal: Normal range of motion. She exhibits no edema or tenderness.   Neurological: She is alert. She displays normal reflexes. No cranial nerve deficit.   Oriented to self and place.    Skin: Skin is warm and dry. No rash noted. No erythema.   Psychiatric: She has a normal mood and affect. Her behavior is normal.       Significant Labs:   CBC:    Recent Labs  Lab 02/27/18  0405 02/27/18  0814 02/28/18  0614   WBC 10.35 9.40 8.01   GRAN 84.1*  8.7* 84.6*  8.0* 84.2*  6.7   HGB 9.5* 9.9* 9.5*   HCT 29.4* 30.9* 28.9*   PLT SEE COMMENT 120* 84*       Chem 10:    Recent Labs  Lab 02/27/18 0405 02/27/18  1235 02/27/18 2006 02/28/18  0614     145 143 144 144  144   K 3.8  3.8 3.2* 3.2* 3.7  3.7   *  111* 109 110 112*  112*   CO2 24  24 26 25 24  24   BUN 23  23 20 19 17  17   CREATININE 1.1  1.1 1.0 0.8 0.8  0.8   GLU 98  98 86 76 70  70   CALCIUM 8.7  8.7 8.7 8.5* 8.6*  8.6*   MG 1.7  --   --  1.5*   PHOS 2.4*  --   --  1.7*       LFTs:    Recent Labs  Lab 02/27/18  0405 02/28/18  0614   ALKPHOS 95 101   BILITOT 1.0 0.8   AST 47* 45*   ALT 22 24   ALBUMIN 2.0* 1.8*       Significant Imaging:   None new

## 2018-02-28 NOTE — ASSESSMENT & PLAN NOTE
- Stable  - Currently not taking any meds due to mental status decline  - Will monitor and start med if uncontrolled .

## 2018-02-28 NOTE — PLAN OF CARE
Problem: SLP Goal  Goal: SLP Goal  Speech Therapy Short Term Goals  Goal expected to be met by 3/5  1. Pt will tolerate puree diet and thin liquids with no overt s/s of aspiration noted.   2. Pt will participate in an ongoing assessment to determine the least restrictive and safest po diet.      Outcome: Ongoing (interventions implemented as appropriate)  Recommend continue pureed diet and thin liquids with strict aspiration precautions. Patient would benefit from a Registered Dietician consult for possible nutritional supplement 2/2 concern for adequate nutrition.   KAL Recinos., CCC-SLP  Pager: [phone]  02/28/2018

## 2018-02-28 NOTE — ASSESSMENT & PLAN NOTE
- Etiology likely pseudothrombocytopenia given platelet clumping and significant improvement with non-EDTA containing tubes  - B12/folate normal earlier in this admission  - Not currently on any medications associated with thrombocytopenia  - Mechanical prophylaxis for DVT only as Plt 84 today    "9 1/2 weeks pregnant,nausea ,vomiting for weeks,on and off lower abdominal cramping,no vaginal bleed"

## 2018-02-28 NOTE — PROGRESS NOTES
"[facility] Hospital Medicine  Progress Note    Patient Name: Lisbeth Seaman  MRN: [id number]  Patient Class: IP- Inpatient   Admission Date: 2/25/2018  Length of Stay: 3 days  Attending Physician: Ezekiel Boyer MD  Primary Care Provider: Tamara Roldan MD    [facility] Team: [facility]OSP MED 1 Micheline Crawley MD    Subjective:     Principal Problem:Encephalopathy, metabolic    HPI:  63 M with h/o HTN, HLD, stroke 9/2017 and since then patient functional status has been declining.She is not walking at all, not eating and unable to work,developed some dementia associated with this most recent stroke. She is no longer able to manage her own finances. She also requires assistance with houshold chores like cooking and cleaning. She is able to feed, dress and bath herself. Since the stroke she has had difficulty controlling her emotions and will cry or laugh for no apparent reason.Her son how is her primary caregiver, brought her to her PCP, MRI head was done, concerning for NPH, referred to  neurosurgery which planning to do "stent" some timed this week per son. But due to sudden GI bleed yesterday 2/24 patient was brought to ED at [facility].  No significant drop on H/H, received 1L NS for pre renal ADITHYA  And transferred to [facility] for neurology/nurosergey evaluation.         Hospital Course:  Admitted to -1, neurology/neurosurgery consulted, will follow their recs, patient has leukocytosis, septic work up sent, H/H stable, no GI bleed since admission.   02/26 Pt able to respond to some questions and able to follow some commands. Continuing w/ IVF w/ improving hypernatremia. Will likely require SNF placement after d/c; PPD placed on 2/26 02/27 Hypernatremia resolved. Mentation improving but not yet returned to baseline. Evaluating thrombocytopenia, likely pseudothrombocytopenia  02/28 Mentation continued to improve; patient does especially well during the " afternoon hours. Plan for therapeutic LP for NPH this afternoon pending coordination w/ PT therapy to evaluate patient before and after the tap. Replacing lytes and continuing IVF now w/ 1/2 NS    Interval History:   Pt continues to improve as her metabolic derangements resolve; approaching baseline. Will plan for a therapeutic LP this afternoon pending PT evaluation set up. Continues to be fecally incontinent w/ urinary retention requiring a gipson. PO intake is good and patient w/ no other concerns at this time.     Review of Systems   Unable to perform ROS: Dementia   Constitutional: Positive for activity change.   Gastrointestinal: Negative for abdominal distention.        Fecal incontinence    Genitourinary:        Urinary retention   Musculoskeletal: Positive for gait problem.   Neurological: Negative for facial asymmetry.   Psychiatric/Behavioral: Positive for confusion.     Objective:     Vital Signs (Most Recent):  Temp: 97.8 °F (36.6 °C) (02/28/18 0755)  Pulse: 89 (02/28/18 0755)  Resp: 20 (02/28/18 0755)  BP: 97/62 (02/28/18 0755)  SpO2: 98 % (02/28/18 0755) Vital Signs (24h Range):  Temp:  [96.9 °F (36.1 °C)-98 °F (36.7 °C)] 97.8 °F (36.6 °C)  Pulse:  [80-89] 89  Resp:  [18-20] 20  SpO2:  [94 %-98 %] 98 %  BP: ()/(56-71) 97/62     Weight: 44.9 kg (98 lb 15.8 oz)  Body mass index is 16.99 kg/m².    Intake/Output Summary (Last 24 hours) at 02/28/18 1123  Last data filed at 02/28/18 0451   Gross per 24 hour   Intake          1443.75 ml   Output             1050 ml   Net           393.75 ml      Physical Exam   Constitutional: She appears well-developed and well-nourished. No distress.   Appears older than stated age   HENT:   Head: Normocephalic and atraumatic.   Eyes: Conjunctivae and EOM are normal. Pupils are equal, round, and reactive to light.   Neck: Normal range of motion. Neck supple. No JVD present.   Cardiovascular: Normal rate and regular rhythm.  Exam reveals no gallop and no friction rub.     No murmur heard.  Pulmonary/Chest: Effort normal and breath sounds normal. No respiratory distress. She has no wheezes.   Abdominal: Soft. Bowel sounds are normal. She exhibits no distension and no mass. There is no tenderness.   Musculoskeletal: Normal range of motion. She exhibits no edema or tenderness.   Neurological: She is alert. She displays normal reflexes. No cranial nerve deficit.   Oriented to self and place.    Skin: Skin is warm and dry. No rash noted. No erythema.   Psychiatric: She has a normal mood and affect. Her behavior is normal.       Significant Labs:   CBC:    Recent Labs  Lab 02/27/18  0405 02/27/18  0814 02/28/18  0614   WBC 10.35 9.40 8.01   GRAN 84.1*  8.7* 84.6*  8.0* 84.2*  6.7   HGB 9.5* 9.9* 9.5*   HCT 29.4* 30.9* 28.9*   PLT SEE COMMENT 120* 84*       Chem 10:    Recent Labs  Lab 02/27/18 0405 02/27/18  1235 02/27/18 2006 02/28/18  0614     145 143 144 144  144   K 3.8  3.8 3.2* 3.2* 3.7  3.7   *  111* 109 110 112*  112*   CO2 24  24 26 25 24  24   BUN 23  23 20 19 17  17   CREATININE 1.1  1.1 1.0 0.8 0.8  0.8   GLU 98  98 86 76 70  70   CALCIUM 8.7  8.7 8.7 8.5* 8.6*  8.6*   MG 1.7  --   --  1.5*   PHOS 2.4*  --   --  1.7*       LFTs:    Recent Labs  Lab 02/27/18 0405 02/28/18  0614   ALKPHOS 95 101   BILITOT 1.0 0.8   AST 47* 45*   ALT 22 24   ALBUMIN 2.0* 1.8*       Significant Imaging:   None new      Assessment/Plan:      * Encephalopathy, metabolic    - Improving  - Vascular dementia without behavioral disturbance at baseline, worsening in the context of NPH.  - Neurosurgery consulted: No acute intervention at this time. Suggest to follow up w/ the NSGY at the OSH. They would prefer to see NSGY here as an outpatient upon discharge.  - Neurology consulted:  No further neurology evaluations necessary   - Hypernatremia likely contributing to AMS; mental status continues to improve as the metabolic derangements are resolved   - TSH 0.247/B12  >2000/FOLATE 44/RPR negative  - Toxic screen negative  - LP for diagnostic and therapeutic purposes this afternoon; PT evaluation pre and post LP scheduled               Thrombocytopenia    - Etiology likely pseudothrombocytopenia given platelet clumping and significant improvement with non-EDTA containing tubes  - B12/folate normal earlier in this admission  - Not currently on any medications associated with thrombocytopenia  - Mechanical prophylaxis for DVT only as Plt 84 today         Urinary retention    - Unclear etiology  - Iniguez in place; will continue to monitor for resolution           Hypophosphatemia    - 1.7 today; will orally replete   - Starting feeds today which should help  - Will follow daily        Hypokalemia    - Replaced, following daily        NPH (normal pressure hydrocephalus)    - Stable  - No surgical intervention while inpatient  - Therapeutic/diagnostic LP inpatient as described above  - Will refer to NSGY upon discharge for shunt eval        Hypertension, essential    - Stable  - Currently not taking any meds due to mental status decline  - Will monitor and start med if uncontrolled .         Anemia    - Stable  - Etiology likely multifactorial with contribution from CKD, iron deficiency, and decrease oral intake  - No evidence of GI loss as described prior to transfer; will continue with mechanical DVT PPX to be safe  - Hemoglobin 9.5 today, stable   - Will follow with daily CBC and transfuse to maintain Hgb > 7            CKD (chronic kidney disease), stage III    - Stable  - Renally dose meds  - Cr continues to improve; Cr 0.8  - Will follow with I/Os and daily BMPs        Hyperlipidemia    Currently not taking meds  Per son: used to take statin         Vascular dementia without behavioral disturbance    - No clear baseline as patient is currently altered but continues to improve           VTE Risk Mitigation         Ordered     High Risk of VTE  Once      02/25/18 0816     Place  sequential compression device  Until discontinued      02/25/18 0816              Micheline Crawley MD  Department of Hospital Medicine   [facility]

## 2018-02-28 NOTE — PT/OT/SLP PROGRESS
Physical Therapy  Pre-LP Treatment    Patient Name:  Lisbeth Seaman   MRN:  [id number]    Recommendations:     Discharge Recommendations: nursing facility, skilled   Discharge Equipment Recommendations: TBD at SNF  Barriers to discharge: currently requiring max (A) to stand, non-ambulatory    Assessment:     Lisbeth Seaman is a 63 y.o. female admitted with a medical diagnosis of Encephalopathy, metabolic. Spoke with MD Clemens this morning, staff requesting a treatment/assessment of patient's current functional ability. Staff then will perform LP for NPH with planned PT follow-up this afternoon to monitor for any changes in strength, balance, mobility, etc. post-LP. Pre-LP, patient requiring max (A) to stand on 3 trials; unable to assume upright posture (head/shoulders forward, hips/buttocks pushing posteriorly). At best stands for ~15 seconds before sitting, unable to ambulate or even take sidesteps. She is mostly flat, able to answer very basic questions such as her name and who are the family members in her room. Will plan on seeing post-LP this afternoon to re-assess for any mobility changes.    She presents with the following impairments/functional limitations:  weakness, impaired endurance, impaired self care skills, impaired functional mobilty, gait instability, impaired balance, impaired cognition, decreased upper extremity function, decreased lower extremity function, decreased coordination, impaired fine motor.    Rehab Prognosis: Good; patient would benefit from acute skilled PT services to address these deficits and reach maximum level of function.      Recent Surgery: * No surgery found *      Plan:     During this hospitalization, patient to be seen 4 x/week to address the above listed problems via gait training, therapeutic activities, therapeutic exercises, neuromuscular re-education  · Plan of Care Expires:  03/28/18   Plan of Care Reviewed with: patient, family    Subjective     Communicated with  "RN prior to session.  Patient found in supine in bed with family present upon PT entry to room, family agreeable to treatment. Pt smiling upon PT entry to room, able to state her name and nods that she is agreeable to session. At end of session, once assisted to supine patient states "couldn't do much" so we spoke about the plan to re-attempt this afternoon.    Family was present throughout session, they were helpful with giving history and techniques that have worked to assist with mobilizing patient in the past.    Chief Complaint: none voiced by patient, she does state that she needs to urinate but therapist reminding her that she has a gipson catheter in place  Patient comments/goals: none stated by patient    Pain/Comfort:  · Pain Rating 1: 0/10  · Pain Rating Post-Intervention 1: 0/10    Patients cultural, spiritual, Mormon conflicts given the current situation: Family has no barriers to learning. Family verbalizes understanding of his/her program and goals and demonstrates them correctly. No cultural, spiritual or educational needs identified.    Objective:     Patient found with: gipson catheter, telemetry, peripheral IV     General Precautions: Standard, fall, aspiration, pureed diet   Orthopedic Precautions:N/A     Functional Mobility:    · Bed Mobility:  · Scooting to EOB in sitting: contact guard assistance with increased time allowed to perform  · Scooting to HOB in supine: total (A) x 2 persons via drawsheet    · Supine to Sit: maximal assistance x 1 person for trunk, she does assist with moving legs towards EOB with max cueing by therapist  · Sit to Supine: total assistance x 1 person for trunk and legs    · Transfers  · Sit to Stand:  maximal assistance with no AD x 3 trials from EOB; initially attempted with (B) hand-held assist by family's wishes (doing this at home prior to admit) and she was able to stand for 2-3 seconds before sitting. Did next 2 trials with therapist's 1 hand on gait belt and " other hand giving hand-held assist to patient.    · Gait:  · Non-ambulatory; significant posterior lean at hips/buttocks with head/shoulders going forwards (unable to maintain erect posture)    · Balance:  · Static Sit: SBA at EOB x 8-10 minutes, MD explaining procedure today. She keeps (R) hand on guardrail for support, (L) hand on bed    · Static Stand: max (A) x 3 trials for 2-3 seconds on trial #1 and then 15 seconds on next 2 trials. Significant posterior lean at hips/buttocks with head/shoulders going forwards (unable to maintain erect posture)    AM-PAC 6 CLICK MOBILITY  Turning over in bed (including adjusting bedclothes, sheets and blankets)?: 2  Sitting down on and standing up from a chair with arms (e.g., wheelchair, bedside commode, etc.): 2  Moving from lying on back to sitting on the side of the bed?: 2  Moving to and from a bed to a chair (including a wheelchair)?: 2  Need to walk in hospital room?: 1  Climbing 3-5 steps with a railing?: 1  Total Score: 10     Patient left supine with all lines intact, call button in reach and RNx2, family present..    GOALS:    Physical Therapy Goals        Problem: Physical Therapy Goal    Goal Priority Disciplines Outcome Goal Variances Interventions   Physical Therapy Goal     PT/OT, PT      Description:  Goals to be met by: 3/9/18     Patient will increase functional independence with mobility by performin. Supine to sit with Maximum Assistance - MET ()  2. Sit to supine with Maximum Assistance - Not met  3. Sit to stand transfer with Moderate Assistance - Not met  4. Bed to chair transfer with Moderate Assistance using most appropriate transfer and AD - Not met  5. Gait  x 20 feet with Moderate Assistance using Rolling Walker - Not met  6. Lower extremity exercise program x10 reps with assistance as needed for strengthening and endurance - Not met                  Time Tracking:     PT Received On: 18  PT Start Time:      PT Stop Time:  1200  PT Total Time (min): 28 min     Billable Minutes: Therapeutic Activity 28    Treatment Type: Treatment  PT/PTA: PT       Zaid Butler, PT  2/28/2018

## 2018-02-28 NOTE — ASSESSMENT & PLAN NOTE
- Improving  - Vascular dementia without behavioral disturbance at baseline, worsening in the context of NPH.  - Neurosurgery consulted: No acute intervention at this time. Suggest to follow up w/ the NSGY at the OSH. They would prefer to see NSGY here as an outpatient upon discharge.  - Neurology consulted:  No further neurology evaluations necessary   - Hypernatremia likely contributing to AMS; mental status continues to improve as the metabolic derangements are resolved   - TSH 0.247/B12 >2000/FOLATE 44/RPR negative  - Toxic screen negative  - LP for diagnostic and therapeutic purposes this afternoon; PT evaluation pre and post LP scheduled

## 2018-02-28 NOTE — ASSESSMENT & PLAN NOTE
- Stable  - Renally dose meds  - Cr continues to improve; Cr 0.8  - Will follow with I/Os and daily BMPs

## 2018-03-01 PROBLEM — E83.42 HYPOMAGNESEMIA: Status: ACTIVE | Noted: 2018-03-01

## 2018-03-01 LAB
ALBUMIN SERPL BCP-MCNC: 1.6 G/DL
ALP SERPL-CCNC: 80 U/L
ALT SERPL W/O P-5'-P-CCNC: 27 U/L
ANION GAP SERPL CALC-SCNC: 10 MMOL/L
AST SERPL-CCNC: 43 U/L
BASOPHILS # BLD AUTO: 0.01 K/UL
BASOPHILS # BLD AUTO: 0.01 K/UL
BASOPHILS NFR BLD: 0.2 %
BASOPHILS NFR BLD: 0.2 %
BILIRUB SERPL-MCNC: 0.5 MG/DL
BUN SERPL-MCNC: 10 MG/DL
CALCIUM SERPL-MCNC: 8 MG/DL
CHLORIDE SERPL-SCNC: 112 MMOL/L
CO2 SERPL-SCNC: 21 MMOL/L
CREAT SERPL-MCNC: 0.8 MG/DL
DIFFERENTIAL METHOD: ABNORMAL
DIFFERENTIAL METHOD: ABNORMAL
EOSINOPHIL # BLD AUTO: 0 K/UL
EOSINOPHIL # BLD AUTO: 0.1 K/UL
EOSINOPHIL NFR BLD: 0.4 %
EOSINOPHIL NFR BLD: 0.9 %
ERYTHROCYTE [DISTWIDTH] IN BLOOD BY AUTOMATED COUNT: 14.6 %
ERYTHROCYTE [DISTWIDTH] IN BLOOD BY AUTOMATED COUNT: 14.6 %
EST. GFR  (AFRICAN AMERICAN): >60 ML/MIN/1.73 M^2
EST. GFR  (NON AFRICAN AMERICAN): >60 ML/MIN/1.73 M^2
GLUCOSE SERPL-MCNC: 68 MG/DL
HCT VFR BLD AUTO: 26.4 %
HCT VFR BLD AUTO: 27.4 %
HGB BLD-MCNC: 8.6 G/DL
HGB BLD-MCNC: 9 G/DL
IMM GRANULOCYTES # BLD AUTO: 0.07 K/UL
IMM GRANULOCYTES # BLD AUTO: 0.08 K/UL
IMM GRANULOCYTES NFR BLD AUTO: 1.4 %
IMM GRANULOCYTES NFR BLD AUTO: 1.5 %
INR PPP: 1
LYMPHOCYTES # BLD AUTO: 0.7 K/UL
LYMPHOCYTES # BLD AUTO: 0.9 K/UL
LYMPHOCYTES NFR BLD: 13.2 %
LYMPHOCYTES NFR BLD: 18.1 %
MAGNESIUM SERPL-MCNC: 1.3 MG/DL
MCH RBC QN AUTO: 30.2 PG
MCH RBC QN AUTO: 30.6 PG
MCHC RBC AUTO-ENTMCNC: 32.6 G/DL
MCHC RBC AUTO-ENTMCNC: 32.8 G/DL
MCV RBC AUTO: 92 FL
MCV RBC AUTO: 94 FL
MONOCYTES # BLD AUTO: 0.3 K/UL
MONOCYTES # BLD AUTO: 0.4 K/UL
MONOCYTES NFR BLD: 5.8 %
MONOCYTES NFR BLD: 7.6 %
NEUTROPHILS # BLD AUTO: 3.7 K/UL
NEUTROPHILS # BLD AUTO: 4.2 K/UL
NEUTROPHILS NFR BLD: 74.1 %
NEUTROPHILS NFR BLD: 76.6 %
NRBC BLD-RTO: 0 /100 WBC
NRBC BLD-RTO: 0 /100 WBC
PHOSPHATE SERPL-MCNC: 1.8 MG/DL
PLATELET # BLD AUTO: 89 K/UL
PLATELET # BLD AUTO: 93 K/UL
PMV BLD AUTO: 11.4 FL
PMV BLD AUTO: 12.3 FL
POTASSIUM SERPL-SCNC: 3.7 MMOL/L
PROT SERPL-MCNC: 5.5 G/DL
PROTHROMBIN TIME: 10.3 SEC
RBC # BLD AUTO: 2.81 M/UL
RBC # BLD AUTO: 2.98 M/UL
SODIUM SERPL-SCNC: 143 MMOL/L
WBC # BLD AUTO: 4.97 K/UL
WBC # BLD AUTO: 5.51 K/UL

## 2018-03-01 PROCEDURE — 80053 COMPREHEN METABOLIC PANEL: CPT

## 2018-03-01 PROCEDURE — 25000003 PHARM REV CODE 250: Performed by: INTERNAL MEDICINE

## 2018-03-01 PROCEDURE — 97530 THERAPEUTIC ACTIVITIES: CPT

## 2018-03-01 PROCEDURE — 92526 ORAL FUNCTION THERAPY: CPT

## 2018-03-01 PROCEDURE — 25000003 PHARM REV CODE 250: Performed by: STUDENT IN AN ORGANIZED HEALTH CARE EDUCATION/TRAINING PROGRAM

## 2018-03-01 PROCEDURE — 83735 ASSAY OF MAGNESIUM: CPT

## 2018-03-01 PROCEDURE — 85610 PROTHROMBIN TIME: CPT

## 2018-03-01 PROCEDURE — 99232 SBSQ HOSP IP/OBS MODERATE 35: CPT | Mod: ,,, | Performed by: HOSPITALIST

## 2018-03-01 PROCEDURE — 84100 ASSAY OF PHOSPHORUS: CPT

## 2018-03-01 PROCEDURE — 11000001 HC ACUTE MED/SURG PRIVATE ROOM

## 2018-03-01 PROCEDURE — 36415 COLL VENOUS BLD VENIPUNCTURE: CPT

## 2018-03-01 PROCEDURE — 85025 COMPLETE CBC W/AUTO DIFF WBC: CPT | Mod: 91

## 2018-03-01 RX ORDER — SODIUM,POTASSIUM PHOSPHATES 280-250MG
2 POWDER IN PACKET (EA) ORAL EVERY 4 HOURS
Status: COMPLETED | OUTPATIENT
Start: 2018-03-01 | End: 2018-03-01

## 2018-03-01 RX ORDER — LANOLIN ALCOHOL/MO/W.PET/CERES
400 CREAM (GRAM) TOPICAL 2 TIMES DAILY
Status: DISCONTINUED | OUTPATIENT
Start: 2018-03-01 | End: 2018-03-01

## 2018-03-01 RX ORDER — LANOLIN ALCOHOL/MO/W.PET/CERES
800 CREAM (GRAM) TOPICAL 2 TIMES DAILY
Status: DISCONTINUED | OUTPATIENT
Start: 2018-03-01 | End: 2018-03-03 | Stop reason: HOSPADM

## 2018-03-01 RX ADMIN — SODIUM CHLORIDE: 4.5 INJECTION, SOLUTION INTRAVENOUS at 02:03

## 2018-03-01 RX ADMIN — POTASSIUM & SODIUM PHOSPHATES POWDER PACK 280-160-250 MG 2 PACKET: 280-160-250 PACK at 11:03

## 2018-03-01 RX ADMIN — POTASSIUM PHOSPHATE, MONOBASIC AND POTASSIUM PHOSPHATE, DIBASIC 30 MMOL: 224; 236 INJECTION, SOLUTION, CONCENTRATE INTRAVENOUS at 12:03

## 2018-03-01 RX ADMIN — MAGNESIUM OXIDE TAB 400 MG (241.3 MG ELEMENTAL MG) 800 MG: 400 (241.3 MG) TAB at 08:03

## 2018-03-01 RX ADMIN — MAGNESIUM OXIDE TAB 400 MG (241.3 MG ELEMENTAL MG) 800 MG: 400 (241.3 MG) TAB at 11:03

## 2018-03-01 RX ADMIN — POTASSIUM & SODIUM PHOSPHATES POWDER PACK 280-160-250 MG 2 PACKET: 280-160-250 PACK at 06:03

## 2018-03-01 RX ADMIN — POTASSIUM & SODIUM PHOSPHATES POWDER PACK 280-160-250 MG 2 PACKET: 280-160-250 PACK at 01:03

## 2018-03-01 NOTE — PLAN OF CARE
BULMARO spoke with Elina at [facility] regarding SNF ([phone]) and was told that Pt has an OOP deductible of 6850.00, of which 1554.00 has been met.  In order for [facility] to accept pt, they would have to pay 5300.00 up front.  BULMARO talked with tx team about this and was told that pt's son would be the decision maker.  BULMARO  placed a call to pt's son, Scooter ([phone]), to discuss DC issues to SNF.  SW left detailed message requesting a return call.  BULMARO will continue to F/U and discuss all available options.      Zohreh Maya LMSW

## 2018-03-01 NOTE — ASSESSMENT & PLAN NOTE
- 1.8 today; will orally replete as well as give Kphos IV 30 mmols to ensure pt is not having a mild refeeding syndrome  - Continue dental soft diet   - Will follow daily

## 2018-03-01 NOTE — ASSESSMENT & PLAN NOTE
- Mg 1.3 today; will replete PO w/ 800 mg BID as IV Mg is now on shortage   - Pt will likely require PO replacement outpatient; will continue 400 mg Mg oxide BID inpatient

## 2018-03-01 NOTE — PT/OT/SLP PROGRESS
Physical Therapy      Patient Name:  Lisbeth Seaman   MRN:  [id number]    PT followed up for post-LP assessment. PT spoke with Dr. Crawley who reports trouble trying to coordinate all disciplines.  This PT leaves at 3:34 this date and unable to perform assessment 2 hr post procedure. Will attempt to schedule procedure for tomorrow. MD provided PT's pager to contact tomorrow when schedule better known.     Janette Glasgow, PT   03/01/2018

## 2018-03-01 NOTE — ASSESSMENT & PLAN NOTE
- Stable  - Etiology likely multifactorial with contribution from CKD, iron deficiency, and decrease oral intake  - No evidence of GI loss as described prior to transfer; will continue with mechanical DVT PPX to be safe  - Hemoglobin 8,6 today, likely dilution as no overt blood loss noted overnight   - Will follow with daily CBC and transfuse to maintain Hgb > 7

## 2018-03-01 NOTE — ASSESSMENT & PLAN NOTE
- Etiology likely pseudothrombocytopenia given platelet clumping and significant improvement with non-EDTA containing tubes  - B12/folate normal earlier in this admission  - Not currently on any medications associated with thrombocytopenia  - Mechanical prophylaxis for DVT only as Plt 89 today; mild improvement but clumping noted once again

## 2018-03-01 NOTE — PT/OT/SLP PROGRESS
Physical Therapy Treatment    Patient Name:  Lisbeth Seaman   MRN:  [id number]    Recommendations:     Discharge Recommendations:  nursing facility, skilled (pending progress)   Discharge Equipment Recommendations:  (TBD pending progress)   Barriers to discharge: Inaccessible home and Decreased caregiver support pt requires increase assist at this time    Assessment:     Lisbeth Seaman is a 63 y.o. female admitted with a medical diagnosis of Encephalopathy, metabolic.  She presents with the following impairments/functional limitations:  weakness, impaired endurance, gait instability, impaired balance, decreased lower extremity function, abnormal tone, impaired functional mobilty, impaired self care skills, impaired cognition, decreased safety awareness, decreased coordination, impaired fine motor.      Pt tolerated session well; denied pain, but nodded agreement to dizziness upon sitting up.  Pt requires Max A for all mobility and transfers with decrease standing tolerance and inability to progress towards gait at this time. LP procedure planned for today; will re-evaluate after to see any change or progress.  Pt would benefit from continued skilled physical therapy to address listed impairments, improve functional independence, and maximize safety with mobility.  PT recommends dc disposition to SNF for further strengthening and endurance to improve overall functional independence prior to d/c home with family support.       Education:  Education provided to pt regarding: PT role/POC; daily orientation. Verbalized understanding.     Whiteboard updated with correct mobility information. RN/PCT notified.  Transfer with therapy only at this time.       Rehab Prognosis:  good; patient would benefit from acute skilled PT services to address these deficits and reach maximum level of function.      Recent Surgery: * No surgery found *      Plan:     During this hospitalization, patient to be seen 4 x/week to address the  "above listed problems via gait training, therapeutic activities, therapeutic exercises, neuromuscular re-education  · Plan of Care Expires:  03/28/18   Plan of Care Reviewed with: patient    Subjective     Communicated with RN prior to session.  Patient found supine, upon PT entry to room, agreeable to treatment.      Pt awake and nodded agreement. Upon sitting up, pt able to say "Good morning."  Patient comments/goals: to get better  Pain/Comfort: discomfort noted with facial grimace with R LE stretching, but pt denied pain throughout visit      Patients cultural, spiritual, Orthodoxy conflicts given the current situation: none    Objective:     Patient found with: telemetry, peripheral IV, gipson catheter     General Precautions: Standard, fall, aspiration, pureed diet   Orthopedic Precautions:N/A   Braces: N/A     Functional Mobility:  Bed Mobility:  Rolling: Minimal Assistance Mod verbal and tactile cueing to initiate  Supine to Sit:  Maximum Assistance assist at trunk; max cueing for LE"s  Sit to supine: Maximum Assistance assist with trunk and LE's  Scooting: Maximum Assistance seated to the left    Transfers:   Sit to stand:Maximum Assistance with No Assistive Device PT in front from EOB; pt only able to tolerate a few seconds of standing before returning to sitting.         Gait:   Not appropriate for gait this visit; unable to tolerate static standing    Balance:  Static Sitting: Supervision or Set-up Assistance no lateral lean  Dynamic Sitting: Activity did not occur   Static Standing: Total Assistance   Dynamic Standing: Activity did not occur       Therapeutic Activities/Exercises:  Pt non-verbal throughout visit except saying good morning to PT upon sitting on EOB. Pt would smile, but other wise flat affect. Pt required increase cueing to initiate and complete tasks. Performed rolling bilateral directions to provide perineal care/cleaning and changing blue pads. Pt returned to R sidelying and remained " curled in the fetal position. All comfort provided and denied any needs.     AM-PAC 6 CLICK MOBILITY  Turning over in bed (including adjusting bedclothes, sheets and blankets)?: 3  Sitting down on and standing up from a chair with arms (e.g., wheelchair, bedside commode, etc.): 2  Moving from lying on back to sitting on the side of the bed?: 2  Moving to and from a bed to a chair (including a wheelchair)?: 2  Need to walk in hospital room?: 1  Climbing 3-5 steps with a railing?: 1  Total Score: 11         Patient left supine with all lines intact, call button in reach and bed alarm on..    GOALS:    Physical Therapy Goals        Problem: Physical Therapy Goal    Goal Priority Disciplines Outcome Goal Variances Interventions   Physical Therapy Goal     PT/OT, PT      Description:  Goals to be met by: 3/9/18     Patient will increase functional independence with mobility by performin. Supine to sit with Maximum Assistance - MET ()  2. Sit to supine with Maximum Assistance - Not met  3. Sit to stand transfer with Moderate Assistance - Not met  4. Bed to chair transfer with Moderate Assistance using most appropriate transfer and AD - Not met  5. Gait  x 20 feet with Moderate Assistance using Rolling Walker - Not met  6. Lower extremity exercise program x10 reps with assistance as needed for strengthening and endurance - Not met                    Time Tracking:     PT Received On: 18  PT Start Time: 916     PT Stop Time: 939  PT Total Time (min): 23 min     Billable Minutes: Therapeutic Activity 23    Treatment Type: Treatment  PT/PTA: PT     PTA Visit Number: 0       Janette Glasgow PT, DPT  Pager: [phone]  3/1/2018

## 2018-03-01 NOTE — PT/OT/SLP PROGRESS
"Speech Language Pathology Treatment    Patient Name:  Lisbeth Seaman   MRN:  [id number]  Admitting Diagnosis: Encephalopathy, metabolic    Recommendations:                 General Recommendations:  Dysphagia therapy  Diet recommendations:  Dental Soft, Liquid Diet Level: Thin   *Pt may benefit from nutritional supplement such as Boost or Ensure (she prefers chocolate) or dietitian consult to ensure meeting nutritional needs.   ? Aspiration Precautions:   ? 1 bite/sip at a time,   ? Alternating bites/sips,   ? Assistance with meals, '  ? Check for pocketing/oral residue,   ? Feed only when awake/alert,   ? HOB to 90 degrees,   ? Meds crushed in puree,   ? Monitor for s/s of aspiration,   ? Remain upright 30 minutes post meal,  ? Small bites/sips and   Strict aspiration precautions  General Precautions: Standard, aspiration, fall, pureed diet  Communication strategies:  provide increased time to answer and give multiple choice as needed    Subjective     "It's okay."      Pain/Comfort:  · Pain Rating 1: 0/10  · Pain Rating Post-Intervention 1: 0/10    Objective:     Has the patient been evaluated by SLP for swallowing?   Yes  Keep patient NPO? No   Current Respiratory Status: room air      Pt seen bedside with untouched breakfast tray on table.  Pt denied hungry though agreeing to participate with trials.  Thin liquids and puree from breakfast tray provided.  Oral hold evident across trials, approx 3-4 seconds with thin and approx 15 seconds with puree.  Oral clearance with WFL.  No overt s/s aspiration.  Solid trials also presented x3.  Increased mastication time with mildly impaired rotational chew noted though no oral hold observed.  Overall oral transit took approx 30 seconds.  Oral clearance was adequate.  No overt s/s aspiration.  Vocal quality remained clear.  Pt refused additional po.  Education on importance of po intake provided.  Pt nodding agreement though full awareness appeared unlikely.  Education to be " ongoing.     Assessment:     Lisbeth Seaman is a 63 y.o. female with an SLP diagnosis of Dysphagia.   Goals:    SLP Goals        Problem: SLP Goal    Goal Priority Disciplines Outcome   SLP Goal     SLP Ongoing (interventions implemented as appropriate)   Description:  Speech Therapy Short Term Goals  Goal expected to be met by 3/5  1. Pt will tolerate puree diet and thin liquids with no overt s/s of aspiration noted.   2. Pt will participate in an ongoing assessment to determine the least restrictive and safest po diet.                       Plan:     · Patient to be seen:  4 x/week   · Plan of Care expires:  03/27/18  · Plan of Care reviewed with:  patient   · SLP Follow-Up:  Yes       Discharge recommendations:  nursing facility, skilled   Barriers to Discharge:  None    Time Tracking:     SLP Treatment Date:   03/01/18  Speech Start Time:  1040  Speech Stop Time:  1052     Speech Total Time (min):  12 min    Billable Minutes: Treatment Swallowing Dysfunction 12    FIFI Guzman, CCC-SLP  03/01/2018

## 2018-03-01 NOTE — PLAN OF CARE
03/01/18 1304   Discharge Reassessment   Assessment Type Discharge Planning Reassessment   Provided patient/caregiver education on the expected discharge date and the discharge plan Yes   Do you have any problems affording any of your prescribed medications? No   Discharge Plan A Skilled Nursing Facility   Discharge Plan B Home with family;Home Health   Can the patient answer the patient profile reliably? No, cognitively impaired   How does the patient rate their overall health at the present time? Poor   Describe the patient's ability to walk at the present time. Does not walk or unable to take any steps at all   How often would a person be available to care for the patient? Often   Number of comorbid conditions (as recorded on the chart) Five or more   During the past month, has the patient often been bothered by feeling down, depressed or hopeless? No   During the past month, has the patient often been bothered by little interest or pleasure in doing things? No

## 2018-03-01 NOTE — PROGRESS NOTES
"[facility] Hospital Medicine  Progress Note    Patient Name: Lisbeth Seaman  MRN: [id number]  Patient Class: IP- Inpatient   Admission Date: 2/25/2018  Length of Stay: 4 days  Attending Physician: Ezekiel Boyer MD  Primary Care Provider: Tamara Roldan MD    [facility] Medicine Team: [facility] HOSP MED 1 Micheline Crawley MD    Subjective:     Principal Problem:Encephalopathy, metabolic    HPI:  63 M with h/o HTN, HLD, stroke 9/2017 and since then patient functional status has been declining.She is not walking at all, not eating and unable to work,developed some dementia associated with this most recent stroke. She is no longer able to manage her own finances. She also requires assistance with houshold chores like cooking and cleaning. She is able to feed, dress and bath herself. Since the stroke she has had difficulty controlling her emotions and will cry or laugh for no apparent reason.Her son how is her primary caregiver, brought her to her PCP, MRI head was done, concerning for NPH, referred to  neurosurgery which planning to do "stent" some timed this week per son. But due to sudden GI bleed yesterday 2/24 patient was brought to ED at [facility].  No significant drop on H/H, received 1L NS for pre renal ADITHYA  And transferred to [facility] for neurology/nurosergey evaluation.         Hospital Course:  Admitted to -1, neurology/neurosurgery consulted, will follow their recs, patient has leukocytosis, septic work up sent, H/H stable, no GI bleed since admission.   02/26 Pt able to respond to some questions and able to follow some commands. Continuing w/ IVF w/ improving hypernatremia. Will likely require SNF placement after d/c; PPD placed on 2/26 02/27 Hypernatremia resolved. Mentation improving but not yet returned to baseline. Evaluating thrombocytopenia, likely pseudothrombocytopenia  02/28 Mentation continued to improve; patient does especially well during the " afternoon hours. Plan for therapeutic LP for NPH this afternoon pending coordination w/ PT therapy to evaluate patient before and after the tap. Replacing lytes and continuing IVF now w/ 1/2 NS  03/01 Pending diagnostic/ therapeutic LP w/ PT eval before and after the procedure. Pt mental status unchanged from the day prior. Obtaining PT/INR and CT head     Interval History:   NAEON. Awaiting LP this afternoon w/ planned PT before and after the procedure; neurology to attempt LP to follow the hospital protocol. Pt w/ no changes in her mental status and she still responds appropriately to select questions. No other concerns at this time.    Review of Systems   Unable to perform ROS: Dementia   Constitutional: Positive for activity change.   Gastrointestinal: Negative for abdominal distention.        Fecal incontinence    Genitourinary:        Urinary retention   Musculoskeletal: Positive for gait problem.   Neurological: Negative for facial asymmetry.   Psychiatric/Behavioral: Positive for confusion.     Objective:     Vital Signs (Most Recent):  Temp: 98.2 °F (36.8 °C) (03/01/18 0409)  Pulse: 86 (03/01/18 0409)  Resp: 16 (03/01/18 0409)  BP: (!) 133/90 (03/01/18 0409)  SpO2: 95 % (03/01/18 0409) Vital Signs (24h Range):  Temp:  [97.5 °F (36.4 °C)-98.7 °F (37.1 °C)] 98.2 °F (36.8 °C)  Pulse:  [86-91] 86  Resp:  [16-18] 16  SpO2:  [91 %-99 %] 95 %  BP: (121-138)/(75-90) 133/90     Weight: 44.9 kg (98 lb 15.8 oz)  Body mass index is 16.99 kg/m².    Intake/Output Summary (Last 24 hours) at 03/01/18 0841  Last data filed at 03/01/18 0549   Gross per 24 hour   Intake               50 ml   Output              950 ml   Net             -900 ml      Physical Exam   Constitutional: She appears well-developed and well-nourished. No distress.   Appears older than stated age   HENT:   Head: Normocephalic and atraumatic.   Eyes: Conjunctivae and EOM are normal. Pupils are equal, round, and reactive to light.   Neck: Normal range of  motion. Neck supple. No JVD present.   Cardiovascular: Normal rate and regular rhythm.  Exam reveals no gallop and no friction rub.    No murmur heard.  Pulmonary/Chest: Effort normal and breath sounds normal. No respiratory distress. She has no wheezes.   Abdominal: Soft. Bowel sounds are normal. She exhibits no distension and no mass. There is no tenderness.   Musculoskeletal: Normal range of motion. She exhibits no edema or tenderness.   Neurological: She is alert. She displays normal reflexes. No cranial nerve deficit.   Oriented to self and place.    Skin: Skin is warm and dry. No rash noted. No erythema.   Psychiatric: She has a normal mood and affect. Her behavior is normal.       Significant Labs:   CBC:    Recent Labs  Lab 02/28/18 0614 03/01/18  0600   WBC 8.01 5.51   GRAN 84.2*  6.7 76.6*  4.2   HGB 9.5* 8.6*   HCT 28.9* 26.4*   PLT 84* 89*       Chem 10:    Recent Labs  Lab 02/27/18 2006 02/28/18 0614 03/01/18  0600    144  144 143   K 3.2* 3.7  3.7 3.7    112*  112* 112*   CO2 25 24  24 21*   BUN 19 17  17 10   CREATININE 0.8 0.8  0.8 0.8   GLU 76 70  70 68*   CALCIUM 8.5* 8.6*  8.6* 8.0*   MG  --  1.5* 1.3*   PHOS  --  1.7* 1.8*       LFTs:    Recent Labs  Lab 02/28/18 0614 03/01/18  0600   ALKPHOS 101 80   BILITOT 0.8 0.5   AST 45* 43*   ALT 24 27   ALBUMIN 1.8* 1.6*       Significant Imaging:   None new      Assessment/Plan:      * Encephalopathy, metabolic    - Improving  - Vascular dementia without behavioral disturbance at baseline, worsening in the context of NPH.  - Neurosurgery consulted: No acute intervention at this time. Suggest to follow up w/ the NSGY at the OSH. They would prefer to see NSGY here as an outpatient upon discharge.  - Neurology consulted:  No further neurology evaluations necessary   - Hypernatremia likely contributing to AMS; mental status continues to improve as the metabolic derangements are resolved   - TSH 0.247/B12 >2000/FOLATE 44/RPR  negative  - Toxic screen negative  - LP bedside 02/28 not successful as pt w/ lots of DJD and scoliosis.  - LP w/ large volume off for diagnostic and therapeutic purposes planned on 03/01 by neurology, following hospital protocol; PT evaluation pre and post LP scheduled   - PT-INR pending as well as CTH as no imaging available from OSH              Hypomagnesemia    - Mg 1.3 today; will replete PO w/ 800 mg BID as IV Mg is now on shortage   - Pt will likely require PO replacement outpatient; will continue 400 mg Mg oxide BID inpatient           Thrombocytopenia    - Etiology likely pseudothrombocytopenia given platelet clumping and significant improvement with non-EDTA containing tubes  - B12/folate normal earlier in this admission  - Not currently on any medications associated with thrombocytopenia  - Mechanical prophylaxis for DVT only as Plt 89 today; mild improvement but clumping noted once again          Urinary retention    - Unclear etiology  - Iniguez in place; will continue to monitor for resolution           Hypophosphatemia    - 1.8 today; will orally replete as well as give Kphos IV 30 mmols to ensure pt is not having a mild refeeding syndrome  - Continue dental soft diet   - Will follow daily        Hypokalemia    - Resolved, following daily         NPH (normal pressure hydrocephalus)    - Stable  - No surgical intervention while inpatient  - Therapeutic/diagnostic LP inpatient as described above  - Will refer to NSGY upon discharge for shunt eval        Hypertension, essential    - Stable  - Currently not taking any meds due to mental status decline  - Will monitor and start med if uncontrolled .         Anemia    - Stable  - Etiology likely multifactorial with contribution from CKD, iron deficiency, and decrease oral intake  - No evidence of GI loss as described prior to transfer; will continue with mechanical DVT PPX to be safe  - Hemoglobin 8,6 today, likely dilution as no overt blood loss noted  overnight   - Will follow with daily CBC and transfuse to maintain Hgb > 7            CKD (chronic kidney disease), stage III    - Stable  - Renally dose meds  - Cr continues to improve; Cr 0.8  - Will follow with I/Os and daily BMPs        Hyperlipidemia    Currently not taking meds  Per son: used to take statin         Vascular dementia without behavioral disturbance    - No clear baseline as patient is currently altered but continues to improve           VTE Risk Mitigation         Ordered     High Risk of VTE  Once      02/25/18 0816     Place sequential compression device  Until discontinued      02/25/18 0816              Micheline Crawley MD  Department of Hospital Medicine   [facility]

## 2018-03-01 NOTE — PLAN OF CARE
Problem: Patient Care Overview  Goal: Plan of Care Review  Outcome: Ongoing (interventions implemented as appropriate)  Patient is oriented to self. Vital signs stable. No falls throughout shift. No complaints of pain. IV fluids administered. Iniguez in place. Lumbar puncture at bedside attempted today. Plan for lumbar puncture with interventional radiology tomorrow.

## 2018-03-01 NOTE — SUBJECTIVE & OBJECTIVE
Interval History:   NAEON. Awaiting LP this afternoon w/ planned PT before and after the procedure; neurology to attempt LP to follow the hospital protocol. Pt w/ no changes in her mental status and she still responds appropriately to select questions. No other concerns at this time.    Review of Systems   Unable to perform ROS: Dementia   Constitutional: Positive for activity change.   Gastrointestinal: Negative for abdominal distention.        Fecal incontinence    Genitourinary:        Urinary retention   Musculoskeletal: Positive for gait problem.   Neurological: Negative for facial asymmetry.   Psychiatric/Behavioral: Positive for confusion.     Objective:     Vital Signs (Most Recent):  Temp: 98.2 °F (36.8 °C) (03/01/18 0409)  Pulse: 86 (03/01/18 0409)  Resp: 16 (03/01/18 0409)  BP: (!) 133/90 (03/01/18 0409)  SpO2: 95 % (03/01/18 0409) Vital Signs (24h Range):  Temp:  [97.5 °F (36.4 °C)-98.7 °F (37.1 °C)] 98.2 °F (36.8 °C)  Pulse:  [86-91] 86  Resp:  [16-18] 16  SpO2:  [91 %-99 %] 95 %  BP: (121-138)/(75-90) 133/90     Weight: 44.9 kg (98 lb 15.8 oz)  Body mass index is 16.99 kg/m².    Intake/Output Summary (Last 24 hours) at 03/01/18 0841  Last data filed at 03/01/18 0549   Gross per 24 hour   Intake               50 ml   Output              950 ml   Net             -900 ml      Physical Exam   Constitutional: She appears well-developed and well-nourished. No distress.   Appears older than stated age   HENT:   Head: Normocephalic and atraumatic.   Eyes: Conjunctivae and EOM are normal. Pupils are equal, round, and reactive to light.   Neck: Normal range of motion. Neck supple. No JVD present.   Cardiovascular: Normal rate and regular rhythm.  Exam reveals no gallop and no friction rub.    No murmur heard.  Pulmonary/Chest: Effort normal and breath sounds normal. No respiratory distress. She has no wheezes.   Abdominal: Soft. Bowel sounds are normal. She exhibits no distension and no mass. There is no  tenderness.   Musculoskeletal: Normal range of motion. She exhibits no edema or tenderness.   Neurological: She is alert. She displays normal reflexes. No cranial nerve deficit.   Oriented to self and place.    Skin: Skin is warm and dry. No rash noted. No erythema.   Psychiatric: She has a normal mood and affect. Her behavior is normal.       Significant Labs:   CBC:    Recent Labs  Lab 02/28/18 0614 03/01/18  0600   WBC 8.01 5.51   GRAN 84.2*  6.7 76.6*  4.2   HGB 9.5* 8.6*   HCT 28.9* 26.4*   PLT 84* 89*       Chem 10:    Recent Labs  Lab 02/27/18 2006 02/28/18 0614 03/01/18  0600    144  144 143   K 3.2* 3.7  3.7 3.7    112*  112* 112*   CO2 25 24  24 21*   BUN 19 17  17 10   CREATININE 0.8 0.8  0.8 0.8   GLU 76 70  70 68*   CALCIUM 8.5* 8.6*  8.6* 8.0*   MG  --  1.5* 1.3*   PHOS  --  1.7* 1.8*       LFTs:    Recent Labs  Lab 02/28/18 0614 03/01/18  0600   ALKPHOS 101 80   BILITOT 0.8 0.5   AST 45* 43*   ALT 24 27   ALBUMIN 1.8* 1.6*       Significant Imaging:   None new

## 2018-03-01 NOTE — PLAN OF CARE
Problem: SLP Goal  Goal: SLP Goal  Speech Therapy Short Term Goals  Goal expected to be met by 3/5  1. Pt will tolerate puree diet and thin liquids with no overt s/s of aspiration noted.   2. Pt will participate in an ongoing assessment to determine the least restrictive and safest po diet.      Outcome: Ongoing (interventions implemented as appropriate)  Rec advance diet to dental soft solids with continued thin liquids.  Pt with oral hold of po across all consistencies, not considerably worse with increase in viscosity and pt with apparent poor appetite/limited intake.  Pt may benefit from nutritional supplement/nutrition consult to ensure adequately meeting nutritional needs. FIFI Guzman, CCC/SLP  3/1/2018

## 2018-03-01 NOTE — PLAN OF CARE
Problem: Physical Therapy Goal  Goal: Physical Therapy Goal  Goals to be met by: 3/9/18     Patient will increase functional independence with mobility by performin. Supine to sit with Maximum Assistance - MET ()  2. Sit to supine with Maximum Assistance - Not met  3. Sit to stand transfer with Moderate Assistance - Not met  4. Bed to chair transfer with Moderate Assistance using most appropriate transfer and AD - Not met  5. Gait  x 20 feet with Moderate Assistance using Rolling Walker - Not met  6. Lower extremity exercise program x10 reps with assistance as needed for strengthening and endurance - Not met         Pt progressing towards goals. All goals remain appropriate at this time.    Janette Glasgow, PT, DPT  3/1/2018

## 2018-03-01 NOTE — ASSESSMENT & PLAN NOTE
- Improving  - Vascular dementia without behavioral disturbance at baseline, worsening in the context of NPH.  - Neurosurgery consulted: No acute intervention at this time. Suggest to follow up w/ the NSGY at the OSH. They would prefer to see NSGY here as an outpatient upon discharge.  - Neurology consulted:  No further neurology evaluations necessary   - Hypernatremia likely contributing to AMS; mental status continues to improve as the metabolic derangements are resolved   - TSH 0.247/B12 >2000/FOLATE 44/RPR negative  - Toxic screen negative  - LP bedside 02/28 not successful as pt w/ lots of DJD and scoliosis.  - LP w/ large volume off for diagnostic and therapeutic purposes planned on 03/01 by neurology, following hospital protocol; PT evaluation pre and post LP scheduled   - PT-INR pending as well as CTH as no imaging available from OSH

## 2018-03-02 LAB
ALBUMIN SERPL BCP-MCNC: 1.8 G/DL
ALP SERPL-CCNC: 78 U/L
ALT SERPL W/O P-5'-P-CCNC: 26 U/L
ANION GAP SERPL CALC-SCNC: 9 MMOL/L
AST SERPL-CCNC: 40 U/L
BACTERIA BLD CULT: NORMAL
BACTERIA BLD CULT: NORMAL
BASOPHILS # BLD AUTO: 0.01 K/UL
BASOPHILS NFR BLD: 0.2 %
BILIRUB SERPL-MCNC: 0.5 MG/DL
BUN SERPL-MCNC: 8 MG/DL
CALCIUM SERPL-MCNC: 7.6 MG/DL
CHLORIDE SERPL-SCNC: 108 MMOL/L
CLARITY CSF: CLEAR
CO2 SERPL-SCNC: 23 MMOL/L
COLOR CSF: COLORLESS
CREAT SERPL-MCNC: 0.7 MG/DL
DIFFERENTIAL METHOD: ABNORMAL
EOSINOPHIL # BLD AUTO: 0 K/UL
EOSINOPHIL NFR BLD: 0.4 %
ERYTHROCYTE [DISTWIDTH] IN BLOOD BY AUTOMATED COUNT: 14.5 %
EST. GFR  (AFRICAN AMERICAN): >60 ML/MIN/1.73 M^2
EST. GFR  (NON AFRICAN AMERICAN): >60 ML/MIN/1.73 M^2
FERRITIN SERPL-MCNC: 1272 NG/ML
GLUCOSE CSF-MCNC: 44 MG/DL
GLUCOSE SERPL-MCNC: 72 MG/DL
HCT VFR BLD AUTO: 25.4 %
HGB BLD-MCNC: 8.2 G/DL
IMM GRANULOCYTES # BLD AUTO: 0.09 K/UL
IMM GRANULOCYTES NFR BLD AUTO: 1.7 %
LYMPHOCYTES # BLD AUTO: 0.9 K/UL
LYMPHOCYTES NFR BLD: 17.2 %
LYMPHOCYTES NFR CSF MANUAL: 100 %
MAGNESIUM SERPL-MCNC: 1.4 MG/DL
MCH RBC QN AUTO: 29.3 PG
MCHC RBC AUTO-ENTMCNC: 32.3 G/DL
MCV RBC AUTO: 91 FL
MONOCYTES # BLD AUTO: 0.4 K/UL
MONOCYTES NFR BLD: 7.2 %
NEUTROPHILS # BLD AUTO: 3.9 K/UL
NEUTROPHILS NFR BLD: 73.3 %
NRBC BLD-RTO: 0 /100 WBC
OB PNL STL: NEGATIVE
PHOSPHATE SERPL-MCNC: 2.4 MG/DL
PLATELET # BLD AUTO: 77 K/UL
PMV BLD AUTO: 11.9 FL
POTASSIUM SERPL-SCNC: 3.3 MMOL/L
PROT CSF-MCNC: 37 MG/DL
PROT SERPL-MCNC: 5.3 G/DL
RBC # BLD AUTO: 2.8 M/UL
RBC # CSF: 0 /CU MM
SODIUM SERPL-SCNC: 140 MMOL/L
SPECIMEN VOL CSF: 2 ML
WBC # BLD AUTO: 5.3 K/UL
WBC # CSF: 1 /CU MM

## 2018-03-02 PROCEDURE — 36415 COLL VENOUS BLD VENIPUNCTURE: CPT

## 2018-03-02 PROCEDURE — A4216 STERILE WATER/SALINE, 10 ML: HCPCS | Performed by: STUDENT IN AN ORGANIZED HEALTH CARE EDUCATION/TRAINING PROGRAM

## 2018-03-02 PROCEDURE — 88108 CYTOPATH CONCENTRATE TECH: CPT | Mod: 26,,, | Performed by: PATHOLOGY

## 2018-03-02 PROCEDURE — 97535 SELF CARE MNGMENT TRAINING: CPT

## 2018-03-02 PROCEDURE — 83735 ASSAY OF MAGNESIUM: CPT

## 2018-03-02 PROCEDURE — 88108 CYTOPATH CONCENTRATE TECH: CPT | Performed by: PATHOLOGY

## 2018-03-02 PROCEDURE — 25000003 PHARM REV CODE 250: Performed by: STUDENT IN AN ORGANIZED HEALTH CARE EDUCATION/TRAINING PROGRAM

## 2018-03-02 PROCEDURE — 82728 ASSAY OF FERRITIN: CPT

## 2018-03-02 PROCEDURE — 89051 BODY FLUID CELL COUNT: CPT

## 2018-03-02 PROCEDURE — 82270 OCCULT BLOOD FECES: CPT

## 2018-03-02 PROCEDURE — 80053 COMPREHEN METABOLIC PANEL: CPT

## 2018-03-02 PROCEDURE — 82945 GLUCOSE OTHER FLUID: CPT

## 2018-03-02 PROCEDURE — 25000003 PHARM REV CODE 250: Performed by: INTERNAL MEDICINE

## 2018-03-02 PROCEDURE — 85025 COMPLETE CBC W/AUTO DIFF WBC: CPT

## 2018-03-02 PROCEDURE — 009U3ZX DRAINAGE OF SPINAL CANAL, PERCUTANEOUS APPROACH, DIAGNOSTIC: ICD-10-PCS | Performed by: RADIOLOGY

## 2018-03-02 PROCEDURE — 87205 SMEAR GRAM STAIN: CPT | Mod: 59

## 2018-03-02 PROCEDURE — 84100 ASSAY OF PHOSPHORUS: CPT

## 2018-03-02 PROCEDURE — 99000 SPECIMEN HANDLING OFFICE-LAB: CPT

## 2018-03-02 PROCEDURE — 99232 SBSQ HOSP IP/OBS MODERATE 35: CPT | Mod: ,,, | Performed by: HOSPITALIST

## 2018-03-02 PROCEDURE — 97530 THERAPEUTIC ACTIVITIES: CPT

## 2018-03-02 PROCEDURE — 87070 CULTURE OTHR SPECIMN AEROBIC: CPT

## 2018-03-02 PROCEDURE — 11000001 HC ACUTE MED/SURG PRIVATE ROOM

## 2018-03-02 PROCEDURE — 84157 ASSAY OF PROTEIN OTHER: CPT

## 2018-03-02 RX ORDER — LANOLIN ALCOHOL/MO/W.PET/CERES
400 CREAM (GRAM) TOPICAL 2 TIMES DAILY
Refills: 0 | Status: ON HOLD | COMMUNITY
Start: 2018-03-02 | End: 2018-03-07

## 2018-03-02 RX ORDER — SODIUM,POTASSIUM PHOSPHATES 280-250MG
1 POWDER IN PACKET (EA) ORAL
Status: DISCONTINUED | OUTPATIENT
Start: 2018-03-02 | End: 2018-03-03 | Stop reason: HOSPADM

## 2018-03-02 RX ADMIN — Medication 3 ML: at 06:03

## 2018-03-02 RX ADMIN — POTASSIUM & SODIUM PHOSPHATES POWDER PACK 280-160-250 MG 1 PACKET: 280-160-250 PACK at 05:03

## 2018-03-02 RX ADMIN — MAGNESIUM OXIDE TAB 400 MG (241.3 MG ELEMENTAL MG) 800 MG: 400 (241.3 MG) TAB at 05:03

## 2018-03-02 RX ADMIN — POTASSIUM & SODIUM PHOSPHATES POWDER PACK 280-160-250 MG 1 PACKET: 280-160-250 PACK at 09:03

## 2018-03-02 RX ADMIN — Medication 10 ML: at 05:03

## 2018-03-02 RX ADMIN — MAGNESIUM OXIDE TAB 400 MG (241.3 MG ELEMENTAL MG) 800 MG: 400 (241.3 MG) TAB at 09:03

## 2018-03-02 RX ADMIN — SODIUM CHLORIDE: 4.5 INJECTION, SOLUTION INTRAVENOUS at 10:03

## 2018-03-02 NOTE — PLAN OF CARE
SW spoke with son and was told that they cannot afford the 5300.00 copay and would like to pursue Hh.  Son gave me the choice of [facility] and referral was sent via [facility].  BULMARO will F/U.      Zohreh Maya LMSW

## 2018-03-02 NOTE — PROCEDURES
Radiology Post-Procedure Note    Pre Op Diagnosis: concern for NPH    Post Op Diagnosis: Same    Procedure: lumbar puncture    Procedure performed by: Mary Vasquez MD    Written Informed Consent Obtained: Yes, from the patient's son (Scooter Seaman [phone])    Specimen Removed: 7 mL CSF    Estimated Blood Loss: Minimal    Findings: Following written informed consent and sterile prep and drape, a 22 gauge spinal needle was inserted at L4 - L5 intralaminar space under fluoroscopic surveillance.  CSF was slow to return, thus only 7 mL clear CSF was removed and sent to the lab for further analysis.  Opening pressure was 14 cm H2O.  Closing pressure not obtained due to limited fluid obtained and normal opening pressure. There were no complications. Full report to follow. Pt. Instructed on post procedure care including but not limited to signs of infection, bleeding, headaches, and follow up with ordering physician.        Patient tolerated procedure well.    Mary Vasquez MD  Resident  Department of Radiology  Pager: [phone]

## 2018-03-02 NOTE — ASSESSMENT & PLAN NOTE
- Stable  - Renally dose meds  - Cr continues to improve; Cr 0.7  - Will follow with I/Os and daily BMPs

## 2018-03-02 NOTE — PLAN OF CARE
[facility]    HOME HEALTH ORDERS  FACE TO FACE ENCOUNTER    Patient Name: Lisbeth Seaman  YOB: 1955    PCP: Tamara Roldan MD   PCP Address: [address] / [place] [address]  PCP Phone Number: [phone]  PCP Fax: [phone]    Encounter Date: 03/02/2018    Admit to Home Health    Diagnoses:  Active Hospital Problems    Diagnosis  POA    *Encephalopathy, metabolic [G93.41]  Yes     Chronic    Hypomagnesemia [E83.42]  Unknown    Thrombocytopenia [D69.6]  Yes    Urinary retention [R33.9]  Yes    Vascular dementia without behavioral disturbance [F01.50]  Yes    CKD (chronic kidney disease), stage III [N18.3]  Yes    Anemia [D64.9]  Yes    Hypertension, essential [I10]  Yes    NPH (normal pressure hydrocephalus) [G91.2]  Yes    Hypokalemia [E87.6]  Yes    Hypophosphatemia [E83.39]  Yes      Resolved Hospital Problems    Diagnosis Date Resolved POA    GIB (gastrointestinal bleeding) [K92.2] 02/27/2018 Yes    Hypernatremia [E87.0] 02/27/2018 Yes    Leukocytosis [D72.829] 02/27/2018 Yes       Future Appointments  Date Time Provider Department Center   3/2/2018 1:00 PM [facility] FL[facility] 500 LB LIMIT [facility]     Follow-up Information     Schedule an appointment as soon as possible for a visit with Roly Pastor - Neurology.    Specialty:  Neurology  Contact information:  [facility]  [facility] [address] [phone]  Additional information:  7th Floor - [facility] [place]           Tamara Roldan MD.    Specialty:  Family Medicine  Contact information:  [address] [place] [address]  [phone]                     I have seen and examined this patient face to face today. My clinical findings that support the need for the home health skilled services and home bound status are the following:  Weakness/numbness causing balance and gait disturbance due to Stroke and Weakness/Debility making it taxing to leave  home.  Requiring assistive device to leave home due to unsteady gait caused by  Stroke and Weakness/Debility.  Medical restrictions requiring assistance of another human to leave home due to  Unstable ambulation and Decreased range of motions in extremities.  Mental confusion making it unsafe for patient to leave home alone due to  Dementia.    Allergies:Review of patient's allergies indicates:  No Known Allergies    Diet: regular diet, dental soft w/ thin liquids     Activities: activity as tolerated    Nursing:   SN to complete comprehensive assessment including routine vital signs. Instruct on disease process and s/s of complications to report to MD. Review/verify medication list sent home with the patient at time of discharge  and instruct patient/caregiver as needed. Frequency may be adjusted depending on start of care date.    Notify MD if SBP > 160 or < 90; DBP > 90 or < 50; HR > 120 or < 50; Temp > 101      CONSULTS:    Physical Therapy to evaluate and treat. Evaluate for home safety and equipment needs; Establish/upgrade home exercise program. Perform / instruct on therapeutic exercises, gait training, transfer training, and Range of Motion.  Occupational Therapy to evaluate and treat. Evaluate home environment for safety and equipment needs. Perform/Instruct on transfers, ADL training, ROM, and therapeutic exercises.  Speech Therapy  to evaluate and treat for  Language, Swallowing and Cognition.   to evaluate for community resources/long-range planning.  Aide to provide assistance with personal care, ADLs, and vital signs.    MISCELLANEOUS CARE:  Iniguez Care: Instruct patient/caregiver to empty Iniguez bag.  Change Iniguez every month.  Continue bladder training outpatient w/ goal of voiding independently   WOUND CARE ORDERS  n/a      Medications: Review discharge medications with patient and family and provide education.      Current Discharge Medication List      START taking these medications     Details   magnesium oxide (MAG-OX) 400 mg tablet Take 1 tablet (400 mg total) by mouth 2 (two) times daily.  Refills: 0         CONTINUE these medications which have NOT CHANGED    Details   atorvastatin (LIPITOR) 40 MG tablet Take 40 mg by mouth once daily.       losartan-hydrochlorothiazide 100-25 mg (HYZAAR) 100-25 mg per tablet Take 1 tablet by mouth once daily.       potassium chloride (K-TAB) 20 mEq Take 20 mEq by mouth once daily.       aspirin 81 MG Chew Take 81 mg by mouth once daily.             I certify that this patient is confined to her home and needs intermittent skilled nursing care, physical therapy, speech therapy and occupational therapy.

## 2018-03-02 NOTE — H&P
Inpatient Radiology Pre-procedure Note    History of Present Illness:  Lisbeth Seaman is a 63 y.o. female who presents for fluoroscopic guided lumbar puncture with pressure measurement.    Admission H&P reviewed.  Past Medical History:   Diagnosis Date    Hyperlipidemia     Hypertension     Stroke 09/2017    residual deficits are ambulates on her own with a limp, but has limited use of her right arm; emotional lability    Vascular dementia     since 9/2017 stroke     History reviewed. No pertinent surgical history.    Review of Systems:   As documented in primary team H&P    Home Meds:   Prior to Admission medications    Medication Sig Start Date End Date Taking? Authorizing Provider   atorvastatin (LIPITOR) 40 MG tablet Take 40 mg by mouth once daily.  9/28/17  Yes Historical Provider, MD   losartan-hydrochlorothiazide 100-25 mg (HYZAAR) 100-25 mg per tablet Take 1 tablet by mouth once daily.  9/28/17  Yes Historical Provider, MD   potassium chloride (K-TAB) 20 mEq Take 20 mEq by mouth once daily.  9/28/17  Yes Historical Provider, MD   aspirin 81 MG Chew Take 81 mg by mouth once daily.    Historical Provider, MD   magnesium oxide (MAG-OX) 400 mg tablet Take 1 tablet (400 mg total) by mouth 2 (two) times daily. 3/2/18   Micheline Crawley MD     Scheduled Meds:    magnesium oxide  800 mg Oral BID    potassium, sodium phosphates  1 packet Oral QID (AC & HS)    sodium chloride 0.9%  3 mL Intravenous Q8H     Continuous Infusions:    sodium chloride 0.45% 75 mL/hr at 03/02/18 1037     PRN Meds:  Anticoagulants/Antiplatelets: aspirin 81 mg at home. Pt admitted for >5 days, no current anticoagulation.    Allergies: Review of patient's allergies indicates:  No Known Allergies  Sedation Hx: have not been any systemic reactions    Labs:    Recent Labs  Lab 03/01/18  1003   INR 1.0       Recent Labs  Lab 03/02/18  0429   WBC 5.30   HGB 8.2*   HCT 25.4*   MCV 91   PLT 77*      Recent Labs  Lab 03/02/18  0429   GLU 72   NA  140   K 3.3*      CO2 23   BUN 8   CREATININE 0.7   CALCIUM 7.6*   MG 1.4*   ALT 26   AST 40   ALBUMIN 1.8*   BILITOT 0.5         Vitals:  Temp: 98 °F (36.7 °C) (03/02/18 1133)  Pulse: 89 (03/02/18 1133)  Resp: 18 (03/02/18 1133)  BP: (!) 170/101 (03/02/18 1133)  SpO2: 96 % (03/02/18 1133)     Physical Exam:  ASA: N/A  Mallampati: N/A    General: no acute distress  Mental Status: alert and oriented to person  HEENT: normocephalic, atraumatic  Chest: unlabored breathing  Heart: regular heart rate  Abdomen: nondistended  Extremity: moves all extremities    Plan: Fluoroscopic guided lumbar puncture  Sedation Plan: local anesthetic only    Mary Vasquez MD  Resident  Department of Radiology  Pager: [phone]

## 2018-03-02 NOTE — ASSESSMENT & PLAN NOTE
- Improving  - Vascular dementia without behavioral disturbance at baseline, worsening in the context of NPH.  - Neurosurgery consulted: No acute intervention at this time. Suggest to follow up w/ the NSGY at the OSH. They would prefer to see NSGY here as an outpatient upon discharge.  - Neurology consulted:  No further neurology evaluations necessary   - Hypernatremia likely contributing to AMS; mental status continues to improve as the metabolic derangements are resolved   - TSH 0.247/B12 >2000/FOLATE 44/RPR negative  - Toxic screen negative  - LP bedside 02/28 not successful as pt w/ lots of DJD and scoliosis.  - FL LP w/ large volume tap for diagnostic and therapeutic purposes planned for 03/02; PT evaluation pre and post LP scheduled   - CTH w/ advanced cerebral and cerebellar volume loss w/ severe chronic microvascular ischemic changes & compensatory enlargement of the ventricles (DDx cerebrovascular disease/neurodegeneratice disorders such as Alzheimer's)

## 2018-03-02 NOTE — PLAN OF CARE
Problem: Patient Care Overview  Goal: Plan of Care Review  Outcome: Ongoing (interventions implemented as appropriate)  POC review with patient. She nodded in agreement.     Problem: Pressure Ulcer Risk (Martell Scale) (Adult,Obstetrics,Pediatric)  Goal: Identify Related Risk Factors and Signs and Symptoms  Related risk factors and signs and symptoms are identified upon initiation of Human Response Clinical Practice Guideline (CPG)   Outcome: Ongoing (interventions implemented as appropriate)  Pt turned q2h to prevent pressure ulcers.

## 2018-03-02 NOTE — NURSING
TB SKIN TEST TO THE LEFT FOREARM NOTED TO BE NEGATIVE FOR ANY RAISED NODULES. NO S/S INDICATIVE OF POSSIBLE POSITIVE RESULTS PER NURSING STANDPOINT.

## 2018-03-02 NOTE — ASSESSMENT & PLAN NOTE
- Stable  - Etiology likely multifactorial with contribution from CKD, iron deficiency, and decrease oral intake  - No evidence of GI loss as described prior to transfer; will continue with mechanical DVT PPX to be safe  - Hemoglobin 8.2 today, likely dilution as no overt blood loss noted overnight   - Will follow with daily CBC and transfuse to maintain Hgb > 7  - Will obtain iron studies and fecal heme occult as the Hb continues to slowly decline

## 2018-03-02 NOTE — PT/OT/SLP PROGRESS
Physical Therapy Treatment &  Post-LP Assessment    Patient Name:  Lisbeth Seaman   MRN:  [id number]    Recommendations:     Discharge Recommendations:  nursing facility, skilled (pending family preference)  Discharge Equipment Recommendations:  (TBD pending progress)   Barriers to discharge: Inaccessible home and Decreased caregiver support requires increase assist at this time    Assessment:     Lisbeth Seaman is a 63 y.o. female admitted with a medical diagnosis of Encephalopathy, metabolic.  She presents with the following impairments/functional limitations:  weakness, impaired endurance, gait instability, impaired balance, decreased coordination, impaired functional mobilty, impaired cognition, decreased safety awareness, decreased lower extremity function, decreased upper extremity function, impaired fine motor.      Pt demonstrated improved arousal and more conversational following procedure.  Pt able to progress to gait still requiring Max A for balance, weight-shift, and coordination, but good step-through sequence.  Overall, pt was more alert and interactive stating she's ready to go home.  Pt required Mod A for transfers and still requires max cueing for initiation and completing tasks.  Pt would benefit from continued skilled physical therapy to address listed impairments, improve functional independence, and maximize safety with mobility.  PT recommends dc disposition to SNF vs Home with HHPT for further strengthening and balance training to improve functional mobility and decrease caregiver burden.       Rehab Prognosis:  good; patient would benefit from acute skilled PT services to address these deficits and reach maximum level of function.      Recent Surgery: * No surgery found *      Plan:     During this hospitalization, patient to be seen 4 x/week to address the above listed problems via gait training, therapeutic activities, therapeutic exercises, neuromuscular re-education  · Plan of Care  "Expires:  03/28/18   Plan of Care Reviewed with: patient, sibling    Subjective     Patient found supine, upon PT entry to room, agreeable to treatment.      "I want to go home. I need to sit down." Pt perseverated on wanting to go home, but willing to participate.   Patient comments/goals: to go home  Pain/Comfort: none stated/indicated      Patients cultural, spiritual, Sabianist conflicts given the current situation: none stated    Objective:     Patient found with: gipson catheter, peripheral IV     General Precautions: Standard, fall, dental soft   Orthopedic Precautions:N/A   Braces: N/A     Functional Mobility:  Bed Mobility:  Rolling: Minimal Assistance v/c to complete  Supine to Sit:  Moderate Assistance assist with trunk  Sit to supine: Moderate Assistance assist with LE's; max cues to complete requiring tactile to initiate  Scooting: Minimal Assistance towards EOB; facilitated forward weight-shift    Transfers:   Sit to stand:Moderate Assistance with No Assistive Device PT in front from EOB        Gait:   Patient ambulated ~3 feet with Hand held assist with Maximum Assistance.  Pt demonstrated posterior lean and step-to gait. Required assist for balance and facilitation of weight-shift with encouragement from sister.         Balance:  Static Sitting: Supervision or Set-up Assistance   Dynamic Sitting: Supervision or Set-up Assistance   Static Standing: Moderate Assistance   Dynamic Standing: Moderate Assistance slight posterior lean        Education:  Education provided to pt and sister regarding: progress; PT POC. Verbalized understanding.     Whiteboard updated with correct mobility information. RN/PCT notified.  Appropriate to transfer with 1 person assist via stand pivot from bed to chair.     AM-PAC 6 CLICK MOBILITY  Turning over in bed (including adjusting bedclothes, sheets and blankets)?: 3  Sitting down on and standing up from a chair with arms (e.g., wheelchair, bedside commode, etc.): " 2  Moving from lying on back to sitting on the side of the bed?: 2  Moving to and from a bed to a chair (including a wheelchair)?: 2  Need to walk in hospital room?: 2  Climbing 3-5 steps with a railing?: 1  Total Score: 12         Patient left supine with all lines intact and call button in reach..    GOALS:    Physical Therapy Goals        Problem: Physical Therapy Goal    Goal Priority Disciplines Outcome Goal Variances Interventions   Physical Therapy Goal     PT/OT, PT      Description:  Goals to be met by: 3/9/18     Patient will increase functional independence with mobility by performin. Supine to sit with Maximum Assistance - MET ()  2. Sit to supine with Maximum Assistance - Not met  3. Sit to stand transfer with Moderate Assistance - Not met  4. Bed to chair transfer with Moderate Assistance using most appropriate transfer and AD - Not met  5. Gait  x 20 feet with Moderate Assistance using Rolling Walker - Not met  6. Lower extremity exercise program x10 reps with assistance as needed for strengthening and endurance - Not met                    Time Tracking:     PT Received On: 18  PT Start Time: 1650     PT Stop Time: 1702  PT Total Time (min): 12 min     Billable Minutes: Therapeutic Activity 12    Treatment Type: Treatment (Post LP assessment)  PT/PTA: PT     PTA Visit Number: 0       Janette Glasgow PT, DPT  Pager: [phone]  3/2/2018

## 2018-03-02 NOTE — PLAN OF CARE
Problem: Physical Therapy Goal  Goal: Physical Therapy Goal  Goals to be met by: 3/9/18     Patient will increase functional independence with mobility by performin. Supine to sit with Maximum Assistance - MET ()  2. Sit to supine with Maximum Assistance - Not met  3. Sit to stand transfer with Moderate Assistance - Not met  4. Bed to chair transfer with Moderate Assistance using most appropriate transfer and AD - Not met  5. Gait  x 20 feet with Moderate Assistance using Rolling Walker - Not met  6. Lower extremity exercise program x10 reps with assistance as needed for strengthening and endurance - Not met         Pt progressing towards goals. All goals remain appropriate at this time.    Janette Glasgow, PT, DPT  3/2/2018

## 2018-03-02 NOTE — NURSING
Patient NPO.  Radiology called and said LP scheduled in radiology department for 1pm.  POC reviewed with patient.  Patient nods head indicating she understands.  Stated day and month of birth, but not year.  Incontinent of small, liquid, brown foul smelling BM.  Excoriation surrounding rectal area.  Barrier cream applied after cleaning.  Repositioned from left to right side with some prompting and assistance.

## 2018-03-02 NOTE — NURSING
Returned to bed.  Alert without verbalized complaint.  Bandaids x2 intact to lumbar spine without visible drainage.  IM 1 informed patient returned.  Small liquid brown BM.  Perineal cream after cleansing.

## 2018-03-02 NOTE — ASSESSMENT & PLAN NOTE
- Mg 1.4 today; will replete PO w/ 800 mg BID as IV Mg is now on shortage   - Pt will likely require PO replacement outpatient; will continue 400 mg Mg oxide BID inpatient

## 2018-03-02 NOTE — PROGRESS NOTES
"[facility] Hospital Medicine  Progress Note    Patient Name: Lisbeth Seaman  MRN: [id number]  Patient Class: IP- Inpatient   Admission Date: 2/25/2018  Length of Stay: 5 days  Attending Physician: Ezekiel Boyer MD  Primary Care Provider: Tamara Roldan MD    [place] [facility] Medicine Team: [facility] MED 1 Micheline Crawley MD    Subjective:     Principal Problem:Encephalopathy, metabolic    HPI:  63 M with h/o HTN, HLD, stroke 9/2017 and since then patient functional status has been declining.She is not walking at all, not eating and unable to work,developed some dementia associated with this most recent stroke. She is no longer able to manage her own finances. She also requires assistance with houshold chores like cooking and cleaning. She is able to feed, dress and bath herself. Since the stroke she has had difficulty controlling her emotions and will cry or laugh for no apparent reason.Her son how is her primary caregiver, brought her to her PCP, MRI head was done, concerning for NPH, referred to  neurosurgery which planning to do "stent" some timed this week per son. But due to sudden GI bleed yesterday 2/24 patient was brought to ED at [facility].  No significant drop on H/H, received 1L NS for pre renal ADITHYA  And transferred to [facility] for neurology/nurosergey evaluation.         Hospital Course:  Admitted to -1, neurology/neurosurgery consulted, will follow their recs, patient has leukocytosis, septic work up sent, H/H stable, no GI bleed since admission.   02/26 Pt able to respond to some questions and able to follow some commands. Continuing w/ IVF w/ improving hypernatremia. Will likely require SNF placement after d/c; PPD placed on 2/26 02/27 Hypernatremia resolved. Mentation improving but not yet returned to baseline. Evaluating thrombocytopenia, likely pseudothrombocytopenia  02/28 Mentation continued to improve; patient does especially well during the " afternoon hours. Plan for therapeutic LP for NPH this afternoon pending coordination w/ PT therapy to evaluate patient before and after the tap. Replacing lytes and continuing IVF now w/ 1/2 NS  03/01 Pending diagnostic/ therapeutic LP w/ PT eval before and after the procedure. Pt mental status unchanged from the day prior. Obtaining PT/INR and CT head   03/02 Diagnostic/therapeutic LP w/ PT evaluation before and after scheduled w/ FL for 13:00. Iron studies ordered as well as fecal heme occult test as Hb is slowly decreasing throughout her admission stay.     Interval History:   NAEON. FL LP this afternoon (13:00) w/ planned PT before and after the procedure; neurology to attempt LP to follow the hospital protocol. Pt w/ no changes in her mental status and she still responds appropriately to select questions. No other concerns at this time.    Review of Systems   Unable to perform ROS: Dementia   Constitutional: Positive for activity change.   Gastrointestinal: Negative for abdominal distention.        Fecal incontinence    Genitourinary:        Urinary retention   Musculoskeletal: Positive for gait problem.   Neurological: Negative for facial asymmetry.   Psychiatric/Behavioral: Positive for confusion.     Objective:     Vital Signs (Most Recent):  Temp: 99.5 °F (37.5 °C) (03/02/18 0333)  Pulse: 91 (03/02/18 0333)  Resp: 20 (03/02/18 0333)  BP: 107/63 (03/02/18 0333)  SpO2: 95 % (03/02/18 0333) Vital Signs (24h Range):  Temp:  [97.7 °F (36.5 °C)-99.5 °F (37.5 °C)] 99.5 °F (37.5 °C)  Pulse:  [80-98] 91  Resp:  [16-20] 20  SpO2:  [95 %-100 %] 95 %  BP: (107-134)/(62-89) 107/63     Weight: 44.9 kg (98 lb 15.8 oz)  Body mass index is 16.99 kg/m².    Intake/Output Summary (Last 24 hours) at 03/02/18 0755  Last data filed at 03/01/18 2315   Gross per 24 hour   Intake                0 ml   Output             1025 ml   Net            -1025 ml      Physical Exam   Constitutional: She appears well-developed and  well-nourished. No distress.   Appears older than stated age   HENT:   Head: Normocephalic and atraumatic.   Eyes: Conjunctivae and EOM are normal. Pupils are equal, round, and reactive to light.   Neck: Normal range of motion. Neck supple. No JVD present.   Cardiovascular: Normal rate and regular rhythm.  Exam reveals no gallop and no friction rub.    No murmur heard.  Pulmonary/Chest: Effort normal and breath sounds normal. No respiratory distress. She has no wheezes.   Abdominal: Soft. Bowel sounds are normal. She exhibits no distension and no mass. There is no tenderness.   Musculoskeletal: Normal range of motion. She exhibits no edema or tenderness.   Neurological: She is alert. She displays normal reflexes. No cranial nerve deficit.   Oriented to self and place.    Skin: Skin is warm and dry. No rash noted. No erythema.   Psychiatric: She has a normal mood and affect. Her behavior is normal.       Significant Labs:   CBC:    Recent Labs  Lab 03/01/18  0600 03/01/18  1459 03/02/18  0429   WBC 5.51 4.97 5.30   GRAN 76.6*  4.2 74.1*  3.7 73.3*  3.9   HGB 8.6* 9.0* 8.2*   HCT 26.4* 27.4* 25.4*   PLT 89* 93* 77*       Chem 10:    Recent Labs  Lab 03/01/18  0600 03/02/18  0429    140   K 3.7 3.3*   * 108   CO2 21* 23   BUN 10 8   CREATININE 0.8 0.7   GLU 68* 72   CALCIUM 8.0* 7.6*   MG 1.3* 1.4*   PHOS 1.8* 2.4*       LFTs:    Recent Labs  Lab 03/01/18  0600 03/02/18  0429   ALKPHOS 80 78   BILITOT 0.5 0.5   AST 43* 40   ALT 27 26   ALBUMIN 1.6* 1.8*       Significant Imaging:   CT Head w/o contrast (03/01)  Age advanced cerebral/cerebellar volume loss and atrophy with severe chronic microvascular ischemic changes and compensatory enlargement of the ventricles. Differential considerations include; cerebrovascular disease, multiple sclerosis, or neurodegenerative disorders such as Alzheimer disease.  Remote bilateral basal ganglia infarcts.      Assessment/Plan:      * Encephalopathy, metabolic    -  Improving  - Vascular dementia without behavioral disturbance at baseline, worsening in the context of NPH.  - Neurosurgery consulted: No acute intervention at this time. Suggest to follow up w/ the NSGY at the OSH. They would prefer to see NSGY here as an outpatient upon discharge.  - Neurology consulted:  No further neurology evaluations necessary   - Hypernatremia likely contributing to AMS; mental status continues to improve as the metabolic derangements are resolved   - TSH 0.247/B12 >2000/FOLATE 44/RPR negative  - Toxic screen negative  - LP bedside 02/28 not successful as pt w/ lots of DJD and scoliosis.  - FL LP w/ large volume tap for diagnostic and therapeutic purposes planned for 03/02; PT evaluation pre and post LP scheduled   - CTH w/ advanced cerebral and cerebellar volume loss w/ severe chronic microvascular ischemic changes & compensatory enlargement of the ventricles (DDx cerebrovascular disease/neurodegeneratice disorders such as Alzheimer's)        Hypomagnesemia    - Mg 1.4 today; will replete PO w/ 800 mg BID as IV Mg is now on shortage   - Pt will likely require PO replacement outpatient; will continue 400 mg Mg oxide BID inpatient           Thrombocytopenia    - Etiology likely pseudothrombocytopenia given platelet clumping and significant improvement with non-EDTA containing tubes  - B12/folate normal earlier in this admission  - Not currently on any medications associated with thrombocytopenia  - Mechanical prophylaxis for DVT only as Plt 77 today w/o clumping  - Unclear etiology; pt has had a history of multiple cell line derangements according to the son; did have a BM Bx at the OSH        Urinary retention    - Unclear etiology  - Iniguez in place; will continue to monitor for resolution           Hypophosphatemia    - 2.4 today; will orally replete, significant improvement after yesterdays repletion   - Continue dental soft diet   - Will follow daily        Hypokalemia    - 3.3 today,  replacing PO        NPH (normal pressure hydrocephalus)    - Stable  - No surgical intervention while inpatient  - Therapeutic/diagnostic LP inpatient as described above  - Will refer to NSGY upon discharge for shunt eval        Hypertension, essential    - Stable  - Currently not taking any meds due to mental status decline  - Will monitor and start med if uncontrolled .         Anemia    - Stable  - Etiology likely multifactorial with contribution from CKD, iron deficiency, and decrease oral intake  - No evidence of GI loss as described prior to transfer; will continue with mechanical DVT PPX to be safe  - Hemoglobin 8.2 today, likely dilution as no overt blood loss noted overnight   - Will follow with daily CBC and transfuse to maintain Hgb > 7  - Will obtain iron studies and fecal heme occult as the Hb continues to slowly decline        CKD (chronic kidney disease), stage III    - Stable  - Renally dose meds  - Cr continues to improve; Cr 0.7  - Will follow with I/Os and daily BMPs        Hyperlipidemia    Currently not taking meds  Per son: used to take statin         Vascular dementia without behavioral disturbance    - No clear baseline as patient is currently altered but continues to improve           VTE Risk Mitigation         Ordered     High Risk of VTE  Once      02/25/18 0816     Place sequential compression device  Until discontinued      02/25/18 0816              Micheline Crawley MD  Department of Hospital Medicine   [facility]

## 2018-03-02 NOTE — ASSESSMENT & PLAN NOTE
- 2.4 today; will orally replete, significant improvement after yesterdays repletion   - Continue dental soft diet   - Will follow daily

## 2018-03-02 NOTE — PT/OT/SLP PROGRESS
Occupational Therapy   Treatment    Name: Lisbeth Seaman  MRN: [id number]  Admitting Diagnosis:  Encephalopathy, metabolic       Recommendations:     Discharge Recommendations: nursing facility, skilled  Discharge Equipment Recommendations:  none  Barriers to discharge:  None    Subjective     Communicated with: RN prior to session.  Pain/Comfort:  · Pain Rating 1: 0/10    Patients cultural, spiritual, [employer] conflicts given the current situation: none stated     Objective:     Patient found with: telemetry, peripheral IV, gipson catheter (all lines intact)    General Precautions: Standard, fall, NPO   Orthopedic Precautions:N/A   Braces: N/A     Occupational Performance:    Bed Mobility:    · Patient completed Rolling/Turning to Left with  total assistance  · Patient completed Rolling/Turning to Right with total assistance  · Patient completed Scooting/Bridging with total assistance  · Patient completed Supine to Sit with total assistance  · Patient completed Sit to Supine with total assistance     Self Care:  Patient wit continued dependent self care but was able to attempt participation in grooming while seated edge of bed.   Patient left supine with all lines intact    AMPAC 6 Click:  AMPAC Total Score: 6    Treatment & Education:  Pt educated on safety, role of OT, importance of increased participation in self care for gains , expectations for participation, expectations for gains, POC, energy conservation, caregiver strain.   - grooming with max A while seated edge of bed   Education:    Assessment:     Lisbeth Seaman is a 63 y.o. female with a medical diagnosis of Encephalopathy, metabolic.  She presents with continued poor tolerance and performance of self care but did attempt participation of grooming at edge of bed. .  Performance deficits affecting function are weakness, impaired endurance, impaired self care skills, impaired functional mobilty, gait instability, impaired cognition, decreased lower  extremity function, decreased upper extremity function.      Rehab Prognosis:  fair; patient would benefit from acute skilled OT services to address these deficits and reach maximum level of function.       Plan:     Patient to be seen 3 x/week to address the above listed problems via self-care/home management, therapeutic activities, therapeutic exercises  · Plan of Care Expires:    · Plan of Care Reviewed with: patient    This Plan of care has been discussed with the patient who was involved in its development and understands and is in agreement with the identified goals and treatment plan    GOALS:    Occupational Therapy Goals        Problem: Occupational Therapy Goal    Goal Priority Disciplines Outcome Interventions   Occupational Therapy Goal     OT, PT/OT Ongoing (interventions implemented as appropriate)    Description:  Goals to be met by: 3/15/18    Patient will increase functional independence with ADLs by performing:    UE Dressing with Minimal Assistance.  LE Dressing with Moderate Assistance.  Toileting from bedside commode with Moderate Assistance for hygiene and clothing management.                       Time Tracking:     OT Date of Treatment: 03/02/18  OT Start Time: 1015  OT Stop Time: 1030  OT Total Time (min): 15 min    Billable Minutes:Self Care/Home Management 15    Ashley Morejon, OT  3/2/2018

## 2018-03-02 NOTE — PLAN OF CARE
Problem: Patient Care Overview  Goal: Plan of Care Review  Outcome: Ongoing (interventions implemented as appropriate)  Recommendations  Recommendation/Intervention:  1. Per SLP, recommend dental soft w/ thin liquids + cardiac diet, once diet is resumed.   2. Recommend ONS, Boost Plus Chocolate TID to increase PO intake.  3. RD to follow.       Goals: PO intake > 50% EEN and EPN  Nutrition Goal Status: new

## 2018-03-02 NOTE — PT/OT/SLP PROGRESS
Physical Therapy Treatment &  Pre-LP Assessment    Patient Name:  Lisbeth Seaman   MRN:  [id number]    Recommendations:     Discharge Recommendations:  nursing facility, skilled   Discharge Equipment Recommendations:  (TBD pending progress)   Barriers to discharge: Inaccessible home and Decreased caregiver support requires increase assist at this time    Assessment:     Lisbeth Seaman is a 63 y.o. female admitted with a medical diagnosis of Encephalopathy, metabolic.  She presents with the following impairments/functional limitations:  weakness, impaired balance, impaired endurance, decreased upper extremity function, decreased lower extremity function, gait instability, impaired cognition, decreased safety awareness, impaired sensation, impaired functional mobilty, decreased coordination, impaired self care skills, abnormal tone, impaired fine motor.      Pt tolerated session well with no indication of increase pain/discomfort.  Pt agreeable to transferring to bedside chair with son present in room.  Pt required Max A for bed mobility and transfers this visit.  Pt able to take two steps this date with PT providing assist for balance and weight-shift.   Pt would benefit from continued skilled physical therapy to address listed impairments, improve functional independence, and maximize safety with mobility.  PT recommends dc disposition to SNF for continued strengthening, balance, and endurance training to improve overall tolerance with upright, functional activities and decrease caregiver burden.       Education:  Education provided to pt regarding: goals of therapy; continued mobility; daily orientation. Verbalized understanding.     Whiteboard updated with correct mobility information. RN/PCT notified.  Appropriate to transfer with therapy only at this time.     Please encourage pt to sit in bedside chair throughout the day to promote OOB mobility and decrease risk of deconditioning while in acute care.        Rehab Prognosis:  good; patient would benefit from acute skilled PT services to address these deficits and reach maximum level of function.      Recent Surgery: * No surgery found *      Plan:     During this hospitalization, patient to be seen 4 x/week to address the above listed problems via gait training, therapeutic activities, therapeutic exercises, neuromuscular re-education  · Plan of Care Expires:  03/28/18   Plan of Care Reviewed with: patient, son    Subjective     Communicated with RN prior to session.  Patient found R sidelying, upon PT entry to room, agreeable to treatment.      Pt non-verbal this visit, but smiles and nods appropriately with max cueing and increase time to respond.  Patient comments/goals: To get better and return home with family  Pain/Comfort: none indicated this visit      Patients cultural, spiritual, Christian conflicts given the current situation: none stated    Objective:     Patient found with: telemetry, peripheral IV, gipson catheter     General Precautions: Standard, aspiration, fall, NPO (Dental Soft/Thin per SLP; NPO for upcoming procedure)   Orthopedic Precautions:N/A   Braces: N/A     Functional Mobility:  Bed Mobility:  Supine to Sit:  Maximum Assistance assist with trunk and LE's  Sit to supine: Activity did not occur pt remained up in chair  Scooting: Moderate Assistance Min A scooting forward to EOB; Max A scooting back into chair    Transfers:   Sit to stand:Maximum Assistance with No Assistive Device /PT in front from EOB; performed 3x  Posterior lean with bilateral knee extension noted  Bed <> Chair:  Stand Pivot with Maximum Assistance with No Assistive Device to the RIGHT pt able to take a step or two with PT providing weight-shift & facilitation.       Balance:  Static Sitting: Stand-by Assistance tolerated sitting this visit; required cueing to remain upright due to pt wanting to return to supine  Dynamic Sitting: Minimal Assistance while OT assisted  with self-care activities  Static Standing: Maximum Assistance posterior lean with B knee hyperextension; only able to tolerate ~3 secs this visit each attempt  Dynamic Standing: Maximum Assistance assist for balance and weight-shift    Therapeutic Activities:  PT returned [id number] to transfer pt back to bed. Pt tolerated sitting up ~1 hr today and seemed more alert upon PT return. Pt able to formulate words, saying Hi in greeting and attempted to communicate trouble/difficulty to stand. Pt required Max A for sit to stand and chair to bed transfer this visit. Standing tolerance same as stated above/previously. Required Max A for sit to supine, with max cues to initiate.     AM-PAC 6 CLICK MOBILITY  Turning over in bed (including adjusting bedclothes, sheets and blankets)?: 3  Sitting down on and standing up from a chair with arms (e.g., wheelchair, bedside commode, etc.): 2  Moving from lying on back to sitting on the side of the bed?: 2  Moving to and from a bed to a chair (including a wheelchair)?: 2  Need to walk in hospital room?: 1  Climbing 3-5 steps with a railing?: 1  Total Score: 11         Patient left up in chair with all lines intact, call button in reach and son present..    GOALS:    Physical Therapy Goals        Problem: Physical Therapy Goal    Goal Priority Disciplines Outcome Goal Variances Interventions   Physical Therapy Goal     PT/OT, PT      Description:  Goals to be met by: 3/9/18     Patient will increase functional independence with mobility by performin. Supine to sit with Maximum Assistance - MET ()  2. Sit to supine with Maximum Assistance - Not met  3. Sit to stand transfer with Moderate Assistance - Not met  4. Bed to chair transfer with Moderate Assistance using most appropriate transfer and AD - Not met  5. Gait  x 20 feet with Moderate Assistance using Rolling Walker - Not met  6. Lower extremity exercise program x10 reps with assistance as needed for strengthening and  endurance - Not met                    Time Tracking:     PT Received On: 03/02/18  PT Start Time: 1008     PT Stop Time: 1028  PT Total Time (min): 28 min     PT returned [id number]    Billable Minutes: Therapeutic Activity 28    Treatment Type: Treatment  PT/PTA: PT     PTA Visit Number: 0       Janette Glasgow PT, DPT  Pager: [phone]  3/2/2018

## 2018-03-02 NOTE — ASSESSMENT & PLAN NOTE
- Etiology likely pseudothrombocytopenia given platelet clumping and significant improvement with non-EDTA containing tubes  - B12/folate normal earlier in this admission  - Not currently on any medications associated with thrombocytopenia  - Mechanical prophylaxis for DVT only as Plt 77 today w/o clumping  - Unclear etiology; pt has had a history of multiple cell line derangements according to the son; did have a BM Bx at the OSH

## 2018-03-02 NOTE — PLAN OF CARE
Problem: Occupational Therapy Goal  Goal: Occupational Therapy Goal  Goals to be met by: 3/15/18    Patient will increase functional independence with ADLs by performing:    UE Dressing with Minimal Assistance.  LE Dressing with Moderate Assistance.  Toileting from bedside commode with Moderate Assistance for hygiene and clothing management.      Outcome: Ongoing (interventions implemented as appropriate)  Goals addressed and unmet.  Cont with POC  Ashley Morejon OT  3/2/2018

## 2018-03-02 NOTE — PROGRESS NOTES
[facility]  Adult Nutrition  Consult Note    SUMMARY     Recommendations  Recommendation/Intervention:  1. Per SLP, recommend dental soft w/ thin liquids + cardiac diet, once diet is resumed.   2. Recommend ONS, Boost Plus Chocolate TID to increase PO intake.  3. RD to follow.     Goals: PO intake > 50% EEN and EPN  Nutrition Goal Status: new  Communication of RD Recs: other (comment) (POC)    Reason for Assessment  Reason for Assessment: physician consult  Diagnosis: other (see comments) (encephalopathy )  Relevent Medical History: stroke, HTN, HLD, dementia    Interdisciplinary Rounds: did not attend     General Information Comments: Pt and family member in room at time of visit. Pt disoriented but family member reports decreased appetite PTA 2/2 difficulty swallowing. Pt was on ONS (Ensure/Boost) at home. Pt was able to drink 1 a day. Family member reports no signs of N/V/D/C.     Nutrition Discharge Planning: Adequate PO intake     Nutrition Prescription Ordered  Current Diet Order: NPO    Evaluation of Received Nutrients/Fluid Intake    Intake/Output Summary (Last 24 hours) at 03/02/18 1257  Last data filed at 03/02/18 0800   Gross per 24 hour   Intake                0 ml   Output             1026 ml   Net            -1026 ml     IV Fluid (mL): 1800  Comments: LBM 3/1/18  % Intake of Estimated Energy Needs: 0 - 25 %  % Meal Intake: 0%     Nutrition Risk Screen  Nutrition Risk Screen: other (see comments) (decreased apetite)    Nutrition/Diet History  Patient Reported Diet/Restrictions/Preferences:  (CHRISTY )  Typical Food/Fluid Intake: Pt was not eating PTA  Food Preferences: no religous/cultural prefs noted.   Supplemental Drinks or Food Habits: Boost Plus, Ensure  Factors Affecting Nutritional Intake: difficulty/impaired swallowing     Labs/Tests/Procedures/Meds  Pertinent Labs Reviewed: reviewed, pertinent  Pertinent Labs Comments: K 3.3, Ca 7.6, Phos 2.4, Mg 1.4, TPro 5.3, Alb 1.8  "  Pertinent Medications Reviewed: reviewed, pertinent  Pertinent Medications Comments: IVF, MgOxide, potassium sodium phosphates     Physical Findings  Overall Physical Appearance: underweight  Oral/Mouth Cavity: tooth/teeth missing  Skin: intact    Anthropometrics  Temp: 98 °F (36.7 °C)  Height: 5' 4" (162.6 cm)  Weight Method: Bed Scale  Weight: 44.9 kg (98 lb 15.8 oz)  Ideal Body Weight (IBW), Female: 120 lb  % Ideal Body Weight, Female (lb): 82.49 lb  BMI (Calculated): 17  BMI Grade: 17 - 18.4 protein-energy malnutrition grade I     Estimated/Assessed Needs  Weight Used For Calorie Calculations: 44.9 kg (98 lb 15.8 oz)   Energy Calorie Requirements (kcal): 1248.75  Energy Need Method: Iron-St Jeor (1.25 )  RMR (Iron-St. Jeor Equation): 999  Weight Used For Protein Calculations: 44.9 kg (98 lb 15.8 oz)  Protein Requirements: 45-50gm/d (1.0-1.1 g/d)  Fluid Requirements (mL): 1 ml/kcal or per MD  Fluid Need Method: RDA Method  RDA Method (mL): 1248.75    Assessment and Plan  Nutrition Problem  Inadequate energy intake     Related to (etiology):   Difficulty swallowing     Signs and Symptoms (as evidenced by):   Diet order, family member reported intake, dietary record showing intake at 0%    Interventions/Recommendations (treatment strategy):  See recs above     Nutrition Diagnosis Status:   New    Monitor and Evaluation  Food and Nutrient Intake: energy intake, food and beverage intake  Food and Nutrient Adminstration: diet order   Physical Activity and Function: nutrition-related ADLs and IADLs  Anthropometric Measurements: weight, weight change, body mass index  Biochemical Data, Medical Tests and Procedures: electrolyte and renal panel, gastrointestinal profile, glucose/endocrine profile, inflammatory profile, lipid profile  Nutrition-Focused Physical Findings: overall appearance    Nutrition Risk  Level of Risk: other (see comments) (f/u 2x/wk )    Nutrition Follow-Up  RD Follow-up?: Yes             "

## 2018-03-02 NOTE — SUBJECTIVE & OBJECTIVE
Interval History:   NAEON. FL LP this afternoon (13:00) w/ planned PT before and after the procedure; neurology to attempt LP to follow the hospital protocol. Pt w/ no changes in her mental status and she still responds appropriately to select questions. No other concerns at this time.    Review of Systems   Unable to perform ROS: Dementia   Constitutional: Positive for activity change.   Gastrointestinal: Negative for abdominal distention.        Fecal incontinence    Genitourinary:        Urinary retention   Musculoskeletal: Positive for gait problem.   Neurological: Negative for facial asymmetry.   Psychiatric/Behavioral: Positive for confusion.     Objective:     Vital Signs (Most Recent):  Temp: 99.5 °F (37.5 °C) (03/02/18 0333)  Pulse: 91 (03/02/18 0333)  Resp: 20 (03/02/18 0333)  BP: 107/63 (03/02/18 0333)  SpO2: 95 % (03/02/18 0333) Vital Signs (24h Range):  Temp:  [97.7 °F (36.5 °C)-99.5 °F (37.5 °C)] 99.5 °F (37.5 °C)  Pulse:  [80-98] 91  Resp:  [16-20] 20  SpO2:  [95 %-100 %] 95 %  BP: (107-134)/(62-89) 107/63     Weight: 44.9 kg (98 lb 15.8 oz)  Body mass index is 16.99 kg/m².    Intake/Output Summary (Last 24 hours) at 03/02/18 0755  Last data filed at 03/01/18 2315   Gross per 24 hour   Intake                0 ml   Output             1025 ml   Net            -1025 ml      Physical Exam   Constitutional: She appears well-developed and well-nourished. No distress.   Appears older than stated age   HENT:   Head: Normocephalic and atraumatic.   Eyes: Conjunctivae and EOM are normal. Pupils are equal, round, and reactive to light.   Neck: Normal range of motion. Neck supple. No JVD present.   Cardiovascular: Normal rate and regular rhythm.  Exam reveals no gallop and no friction rub.    No murmur heard.  Pulmonary/Chest: Effort normal and breath sounds normal. No respiratory distress. She has no wheezes.   Abdominal: Soft. Bowel sounds are normal. She exhibits no distension and no mass. There is no  tenderness.   Musculoskeletal: Normal range of motion. She exhibits no edema or tenderness.   Neurological: She is alert. She displays normal reflexes. No cranial nerve deficit.   Oriented to self and place.    Skin: Skin is warm and dry. No rash noted. No erythema.   Psychiatric: She has a normal mood and affect. Her behavior is normal.       Significant Labs:   CBC:    Recent Labs  Lab 03/01/18  0600 03/01/18  1459 03/02/18  0429   WBC 5.51 4.97 5.30   GRAN 76.6*  4.2 74.1*  3.7 73.3*  3.9   HGB 8.6* 9.0* 8.2*   HCT 26.4* 27.4* 25.4*   PLT 89* 93* 77*       Chem 10:    Recent Labs  Lab 03/01/18  0600 03/02/18  0429    140   K 3.7 3.3*   * 108   CO2 21* 23   BUN 10 8   CREATININE 0.8 0.7   GLU 68* 72   CALCIUM 8.0* 7.6*   MG 1.3* 1.4*   PHOS 1.8* 2.4*       LFTs:    Recent Labs  Lab 03/01/18  0600 03/02/18  0429   ALKPHOS 80 78   BILITOT 0.5 0.5   AST 43* 40   ALT 27 26   ALBUMIN 1.6* 1.8*       Significant Imaging:   CT Head w/o contrast (03/01)  Age advanced cerebral/cerebellar volume loss and atrophy with severe chronic microvascular ischemic changes and compensatory enlargement of the ventricles. Differential considerations include; cerebrovascular disease, multiple sclerosis, or neurodegenerative disorders such as Alzheimer disease.  Remote bilateral basal ganglia infarcts.

## 2018-03-02 NOTE — PLAN OF CARE
BULMARO sent referral to and spoke with Lenea at [facility] ([phone]) and was told that they would be able to accept pt upon DC.  Leena stated that she has spoken with pt's daughter in law and was aware that referral was on the way.  BULMARO received confirmation of receipt of fax.      Zohreh Maya LMSW

## 2018-03-02 NOTE — NURSING
Up in chair briefly per PT.  IV leaking and removed intact.  New IV started left antecubital.  Son at bedside.  Incontinent again of liquid brown odorous stool.  Perineal care done.  To radiology per katt.  LP planned.

## 2018-03-03 VITALS
RESPIRATION RATE: 18 BRPM | HEART RATE: 90 BPM | TEMPERATURE: 98 F | DIASTOLIC BLOOD PRESSURE: 86 MMHG | SYSTOLIC BLOOD PRESSURE: 122 MMHG | WEIGHT: 99 LBS | BODY MASS INDEX: 16.9 KG/M2 | HEIGHT: 64 IN | OXYGEN SATURATION: 95 %

## 2018-03-03 LAB
ALBUMIN SERPL BCP-MCNC: 1.8 G/DL
ALP SERPL-CCNC: 73 U/L
ALT SERPL W/O P-5'-P-CCNC: 27 U/L
ANION GAP SERPL CALC-SCNC: 11 MMOL/L
AST SERPL-CCNC: 33 U/L
BASOPHILS # BLD AUTO: 0.01 K/UL
BASOPHILS NFR BLD: 0.2 %
BILIRUB SERPL-MCNC: 0.5 MG/DL
BUN SERPL-MCNC: 8 MG/DL
CALCIUM SERPL-MCNC: 8.7 MG/DL
CHLORIDE SERPL-SCNC: 108 MMOL/L
CO2 SERPL-SCNC: 24 MMOL/L
CREAT SERPL-MCNC: 0.7 MG/DL
DIFFERENTIAL METHOD: ABNORMAL
EOSINOPHIL # BLD AUTO: 0 K/UL
EOSINOPHIL NFR BLD: 0.6 %
ERYTHROCYTE [DISTWIDTH] IN BLOOD BY AUTOMATED COUNT: 14.7 %
EST. GFR  (AFRICAN AMERICAN): >60 ML/MIN/1.73 M^2
EST. GFR  (NON AFRICAN AMERICAN): >60 ML/MIN/1.73 M^2
GLUCOSE SERPL-MCNC: 81 MG/DL
HCT VFR BLD AUTO: 30.5 %
HGB BLD-MCNC: 9.9 G/DL
IMM GRANULOCYTES # BLD AUTO: 0.06 K/UL
IMM GRANULOCYTES NFR BLD AUTO: 1.1 %
LYMPHOCYTES # BLD AUTO: 0.8 K/UL
LYMPHOCYTES NFR BLD: 14.3 %
MAGNESIUM SERPL-MCNC: 1.8 MG/DL
MCH RBC QN AUTO: 30.2 PG
MCHC RBC AUTO-ENTMCNC: 32.5 G/DL
MCV RBC AUTO: 93 FL
MONOCYTES # BLD AUTO: 0.3 K/UL
MONOCYTES NFR BLD: 5.9 %
NEUTROPHILS # BLD AUTO: 4.3 K/UL
NEUTROPHILS NFR BLD: 77.9 %
NRBC BLD-RTO: 0 /100 WBC
PHOSPHATE SERPL-MCNC: 2 MG/DL
PLATELET # BLD AUTO: 121 K/UL
PLATELET # BLD AUTO: 136 K/UL
PMV BLD AUTO: 10.2 FL
PMV BLD AUTO: 10.9 FL
POTASSIUM SERPL-SCNC: 3.9 MMOL/L
PROT SERPL-MCNC: 5.8 G/DL
RBC # BLD AUTO: 3.28 M/UL
SODIUM SERPL-SCNC: 143 MMOL/L
WBC # BLD AUTO: 5.45 K/UL

## 2018-03-03 PROCEDURE — 85025 COMPLETE CBC W/AUTO DIFF WBC: CPT

## 2018-03-03 PROCEDURE — 84100 ASSAY OF PHOSPHORUS: CPT

## 2018-03-03 PROCEDURE — 25000003 PHARM REV CODE 250: Performed by: STUDENT IN AN ORGANIZED HEALTH CARE EDUCATION/TRAINING PROGRAM

## 2018-03-03 PROCEDURE — A4216 STERILE WATER/SALINE, 10 ML: HCPCS | Performed by: STUDENT IN AN ORGANIZED HEALTH CARE EDUCATION/TRAINING PROGRAM

## 2018-03-03 PROCEDURE — 99238 HOSP IP/OBS DSCHRG MGMT 30/<: CPT | Mod: ,,, | Performed by: HOSPITALIST

## 2018-03-03 PROCEDURE — 25000003 PHARM REV CODE 250: Performed by: INTERNAL MEDICINE

## 2018-03-03 PROCEDURE — 85049 AUTOMATED PLATELET COUNT: CPT

## 2018-03-03 PROCEDURE — 83735 ASSAY OF MAGNESIUM: CPT

## 2018-03-03 PROCEDURE — 80053 COMPREHEN METABOLIC PANEL: CPT

## 2018-03-03 PROCEDURE — 36415 COLL VENOUS BLD VENIPUNCTURE: CPT

## 2018-03-03 RX ADMIN — POTASSIUM & SODIUM PHOSPHATES POWDER PACK 280-160-250 MG 1 PACKET: 280-160-250 PACK at 10:03

## 2018-03-03 RX ADMIN — MAGNESIUM OXIDE TAB 400 MG (241.3 MG ELEMENTAL MG) 800 MG: 400 (241.3 MG) TAB at 09:03

## 2018-03-03 RX ADMIN — Medication 10 ML: at 01:03

## 2018-03-03 RX ADMIN — POTASSIUM & SODIUM PHOSPHATES POWDER PACK 280-160-250 MG 1 PACKET: 280-160-250 PACK at 05:03

## 2018-03-03 RX ADMIN — SODIUM CHLORIDE: 4.5 INJECTION, SOLUTION INTRAVENOUS at 09:03

## 2018-03-03 NOTE — NURSING
Perineal care done.  Excoriation inner buttocks much improved.  Iniguez emptied and left intact.  IV removed intact.  Discharge instructions reviewed with patient and her son.  Discharged to home via car.

## 2018-03-03 NOTE — PLAN OF CARE
Weekend  left message with [facility], [phone] to confirm receipt of HH referral.    Rita at [facility] confirmed receipt of referral and advised that they will check pt in tomorrow.    Mecehlle Del Rio LMSW

## 2018-03-03 NOTE — NURSING
20G jelco removed from Left antecubital as IV with S/S infiltration.  Unable to find venous access.  Anemia noted.  Will consult PICC team.

## 2018-03-03 NOTE — DISCHARGE SUMMARY
"[facility] Hospital Medicine  Discharge Summary      Patient Name: Lisbeth Seaman  MRN: [id number]  Admission Date: 2/25/2018  Hospital Length of Stay: 6 days  Discharge Date and Time:  03/03/2018 3:19 PM  Attending Physician: Ezekiel Boyer MD   Discharging Provider: Micheline Crawley MD  Primary Care Provider: Tamara Roldan MD  Hospital Medicine Team: [facility] [place] [place] HOSP MED 1 Micheline Crawley MD    HPI:   63 M with h/o HTN, HLD, stroke 9/2017 and since then patient functional status has been declining.She is not walking at all, not eating and unable to work,developed some dementia associated with this most recent stroke. She is no longer able to manage her own finances. She also requires assistance with houshold chores like cooking and cleaning. She is able to feed, dress and bath herself. Since the stroke she has had difficulty controlling her emotions and will cry or laugh for no apparent reason.Her son how is her primary caregiver, brought her to her PCP, MRI head was done, concerning for NPH, referred to  neurosurgery which planning to do "stent" some timed this week per son. But due to sudden GI bleed yesterday 2/24 patient was brought to ED at [facility].  No significant drop on H/H, received 1L NS for pre renal ADITHYA  And transferred to [facility] for neurology/nurosergey evaluation.         * No surgery found *      Hospital Course:   Admitted to -1, neurology/neurosurgery consulted, will follow their recs, patient has leukocytosis, septic work up sent, H/H stable, no GI bleed since admission.   02/26 Pt able to respond to some questions and able to follow some commands. Continuing w/ IVF w/ improving hypernatremia. Will likely require SNF placement after d/c; PPD placed on 2/26 02/27 Hypernatremia resolved. Mentation improving but not yet returned to baseline. Evaluating thrombocytopenia, likely pseudothrombocytopenia  02/28 Mentation continued to improve; patient does " especially well during the afternoon hours. Plan for therapeutic LP for NPH this afternoon pending coordination w/ PT therapy to evaluate patient before and after the tap. Replacing lytes and continuing IVF now w/ 1/2 NS  03/01 Pending diagnostic/ therapeutic LP w/ PT eval before and after the procedure. Pt mental status unchanged from the day prior. Obtaining PT/INR and CT head   03/02 Diagnostic/therapeutic LP w/ PT evaluation before and after scheduled w/ FL for 13:00. Iron studies ordered as well as fecal heme occult test as Hb is slowly decreasing throughout her admission stay.   03/03 Patient improved w/ LP - much brighter and conversant today. Will need to f/u w/ both NSGY and Neurology outpatient. Stable to go home w/ home health today. Iniguez in; bladder training w. H/H     Consults:   Consults         Status Ordering Provider     Inpatient consult to Neurology  Once     Provider:  (Not yet assigned)    Completed ELIAN PARISI II     Inpatient consult to Neurosurgery  Once     Provider:  (Not yet assigned)    Acknowledged ELIAN PARISI II     Inpatient consult to PICC team ([facility])  Once     Provider:  (Not yet assigned)    Completed JARAD AVINA     Inpatient consult to Registered Dietitian/Nutritionist  Once     Provider:  (Not yet assigned)    Completed JARAD AVINA          No new Assessment & Plan notes have been filed under this hospital service since the last note was generated.  Service: Hospital Medicine    Final Active Diagnoses:    Diagnosis Date Noted POA    PRINCIPAL PROBLEM:  Encephalopathy, metabolic [G93.41] 02/25/2018 Yes     Chronic    Hypomagnesemia [E83.42] 03/01/2018 Unknown    Thrombocytopenia [D69.6] 02/27/2018 Yes    Urinary retention [R33.9] 02/26/2018 Yes    Vascular dementia without behavioral disturbance [F01.50] 02/25/2018 Yes    CKD (chronic kidney disease), stage III [N18.3] 02/25/2018 Yes    Anemia [D64.9] 02/25/2018 Yes    Hypertension, essential  [I10] 02/25/2018 Yes    NPH (normal pressure hydrocephalus) [G91.2] 02/25/2018 Yes    Hypokalemia [E87.6] 02/25/2018 Yes    Hypophosphatemia [E83.39] 02/25/2018 Yes      Problems Resolved During this Admission:    Diagnosis Date Noted Date Resolved POA    GIB (gastrointestinal bleeding) [K92.2] 02/25/2018 02/27/2018 Yes    Hypernatremia [E87.0] 02/25/2018 02/27/2018 Yes    Leukocytosis [D72.829] 02/25/2018 02/27/2018 Yes       Discharged Condition: stable    Disposition: [place] [facility]    Follow Up:  Follow-up Information     Schedule an appointment as soon as possible for a visit with Roly Pastor - Neurology.    Specialty:  Neurology  Contact information:  [address] [place] [id number] [phone]  Additional information:  [facility] Floor - Clinic Hannah Roldan MD.    Specialty:  Family Medicine  Contact information:  [address] [place] [address] [phone]             Roly Pastor - Neurology.    Specialty:  Neurology  Contact information:  [address] [place] [address] [phone]  Additional information:  7th Floor - Clinic [place]           Roly Pastor - Neurosurgery [facility].    Specialty:  Neurosurgery  Contact information:  [address] [place] [id number] [phone]  Additional information:  7th [facility]               Patient Instructions:     Ambulatory Referral to Neurosurgery   Referral Priority: Routine Referral Type: Consultation   Referral Reason: Specialty Services Required    Requested Specialty: Neurosurgery    Number of Visits Requested: 1      Ambulatory Referral to Neurology   Referral Priority: Routine Referral Type: Consultation   Referral Reason: Specialty Services Required    Requested Specialty: Neurology    Number of Visits Requested: 1      Ambulatory referral to Neurosurgery   Referral Priority: Routine Referral Type: Consultation   Referral Reason: Specialty Services Required    Requested  Specialty: Neurosurgery    Number of Visits Requested: 1      Ambulatory referral to Neurology   Referral Priority: Routine Referral Type: Consultation   Referral Reason: Specialty Services Required    Requested Specialty: Neurology    Number of Visits Requested: 1      Diet Adult Regular     Activity as tolerated     Notify your health care provider if you experience any of the following:  temperature >100.4     Notify your health care provider if you experience any of the following:  persistent nausea and vomiting or diarrhea     Notify your health care provider if you experience any of the following:  severe uncontrolled pain     Notify your health care provider if you experience any of the following:  redness, tenderness, or signs of infection (pain, swelling, redness, odor or green/yellow discharge around incision site)     Notify your health care provider if you experience any of the following:  difficulty breathing or increased cough     Notify your health care provider if you experience any of the following:  severe persistent headache     Notify your health care provider if you experience any of the following:  worsening rash     Notify your health care provider if you experience any of the following:  persistent dizziness, light-headedness, or visual disturbances     Notify your health care provider if you experience any of the following:  increased confusion or weakness     No dressing needed         Significant Diagnostic Studies: Labs:   BMP:   Recent Labs  Lab 03/02/18  0429 03/03/18  0711   GLU 72 81    143   K 3.3* 3.9    108   CO2 23 24   BUN 8 8   CREATININE 0.7 0.7   CALCIUM 7.6* 8.7   MG 1.4* 1.8   , CMP   Recent Labs  Lab 03/02/18  0429 03/03/18  0711    143   K 3.3* 3.9    108   CO2 23 24   GLU 72 81   BUN 8 8   CREATININE 0.7 0.7   CALCIUM 7.6* 8.7   PROT 5.3* 5.8*   ALBUMIN 1.8* 1.8*   BILITOT 0.5 0.5   ALKPHOS 78 73   AST 40 33   ALT 26 27   ANIONGAP 9 11    ESTGFRAFRICA >60.0 >60.0   EGFRNONAA >60.0 >60.0   , CBC   Recent Labs  Lab 03/02/18  0429 03/03/18  0950 03/03/18  1140   WBC 5.30  --  5.45   HGB 8.2*  --  9.9*   HCT 25.4*  --  30.5*   PLT 77* 136* 121*   , INR   Lab Results   Component Value Date    INR 1.0 03/01/2018    INR 1.1 02/25/2018    INR 1.0 10/25/2017   , Lipid Panel No results found for: CHOL, HDL, LDLCALC, TRIG, CHOLHDL, Troponin No results for input(s): TROPONINI in the last 168 hours., A1C: No results for input(s): HGBA1C in the last 4320 hours. and All labs within the past 24 hours have been reviewed  Microbiology:   Blood Culture   Lab Results   Component Value Date    LABBLOO No growth after 5 days. 02/25/2018     Radiology: CT scan: HEAD  Age advanced cerebral/cerebellar volume loss and atrophy with severe chronic microvascular ischemic changes and compensatory enlargement of the ventricles. Differential considerations include; cerebrovascular disease, multiple sclerosis, or neurodegenerative disorders such as Alzheimer disease.  Remote bilateral basal ganglia infarcts.  Pending Diagnostic Studies:     Procedure Component Value Units Date/Time    Cytology Specimen-Medical Cytology (Fluid/Wash/Brush) [[id number]] Collected:  03/02/18 1510    Order Status:  Sent Lab Status:  No result     Specimen:  Cerebrospinal fluid (CSF)     FL Lumbar Puncture (xpd) [[id number]] Resulted:  03/02/18 1332    Order Status:  Sent Lab Status:  In process Updated:  03/02/18 1708    Freeze and Hold, [facility] [[id number]] Collected:  03/02/18 1510    Order Status:  Sent Lab Status:  No result     Specimen:  CSF (Spinal Fluid) from Cerebrospinal Fluid     Iron and TIBC [[id number]]     Order Status:  Sent Lab Status:  No result     Specimen:  Blood from Blood          Medications:  Reconciled Home Medications:   Current Discharge Medication List      START taking these medications    Details   magnesium oxide (MAG-OX) 400 mg tablet Take 1 tablet (400 mg total) by mouth 2  (two) times daily.  Refills: 0         CONTINUE these medications which have NOT CHANGED    Details   atorvastatin (LIPITOR) 40 MG tablet Take 40 mg by mouth once daily.       potassium chloride (K-TAB) 20 mEq Take 20 mEq by mouth once daily.       aspirin 81 MG Chew Take 81 mg by mouth once daily.         STOP taking these medications       losartan-hydrochlorothiazide 100-25 mg (HYZAAR) 100-25 mg per tablet Comments:   Reason for Stopping:               Indwelling Lines/Drains at time of discharge:   Lines/Drains/Airways     Drain                 Urethral Catheter 02/25/18 1653 5 days                Time spent on the discharge of patient: 35 minutes  Patient was seen and examined on the date of discharge and determined to be suitable for discharge.         Micheline Crawley MD  Department of Hospital Medicine  [facility]

## 2018-03-03 NOTE — PLAN OF CARE
Problem: Patient Care Overview  Goal: Plan of Care Review  Outcome: Ongoing (interventions implemented as appropriate)  Reviewed plan of care, patient awaiting discharge with homehealth services. Patient turned independently throughout shift, had 2 small loose brown bowel movements, no complaints over night. No acute events, see flowsheets for detailed assessment information, will continue to monitor.

## 2018-03-06 ENCOUNTER — HOSPITAL ENCOUNTER (EMERGENCY)
Facility: HOSPITAL | Age: 63
Discharge: OTHER INSTITUTION | End: 2018-03-06
Attending: EMERGENCY MEDICINE
Payer: COMMERCIAL

## 2018-03-06 ENCOUNTER — PATIENT OUTREACH (OUTPATIENT)
Dept: ADMINISTRATIVE | Facility: CLINIC | Age: 63
End: 2018-03-06

## 2018-03-06 ENCOUNTER — HOSPITAL ENCOUNTER (INPATIENT)
Facility: HOSPITAL | Age: 63
LOS: 16 days | Discharge: HOME HEALTH | DRG: 031 | End: 2018-03-22
Attending: HOSPITALIST | Admitting: HOSPITALIST
Payer: COMMERCIAL

## 2018-03-06 VITALS
RESPIRATION RATE: 16 BRPM | HEIGHT: 61 IN | DIASTOLIC BLOOD PRESSURE: 96 MMHG | BODY MASS INDEX: 19.63 KG/M2 | HEART RATE: 79 BPM | SYSTOLIC BLOOD PRESSURE: 159 MMHG | WEIGHT: 104 LBS | TEMPERATURE: 99 F | OXYGEN SATURATION: 95 %

## 2018-03-06 DIAGNOSIS — R53.81 DEBILITY: ICD-10-CM

## 2018-03-06 DIAGNOSIS — N39.0 URINARY TRACT INFECTION ASSOCIATED WITH INDWELLING URETHRAL CATHETER, INITIAL ENCOUNTER: ICD-10-CM

## 2018-03-06 DIAGNOSIS — F01.50 VASCULAR DEMENTIA WITHOUT BEHAVIORAL DISTURBANCE: ICD-10-CM

## 2018-03-06 DIAGNOSIS — G91.2 NPH (NORMAL PRESSURE HYDROCEPHALUS): Primary | ICD-10-CM

## 2018-03-06 DIAGNOSIS — T83.511A URINARY TRACT INFECTION ASSOCIATED WITH INDWELLING URETHRAL CATHETER, INITIAL ENCOUNTER: ICD-10-CM

## 2018-03-06 DIAGNOSIS — G91.2 NPH (NORMAL PRESSURE HYDROCEPHALUS): ICD-10-CM

## 2018-03-06 DIAGNOSIS — G91.2 NORMAL PRESSURE HYDROCEPHALUS: Primary | ICD-10-CM

## 2018-03-06 DIAGNOSIS — R53.1 WEAKNESS: ICD-10-CM

## 2018-03-06 DIAGNOSIS — I10 HYPERTENSION, ESSENTIAL: ICD-10-CM

## 2018-03-06 DIAGNOSIS — G93.41 ENCEPHALOPATHY, METABOLIC: Chronic | ICD-10-CM

## 2018-03-06 DIAGNOSIS — N30.01 ACUTE CYSTITIS WITH HEMATURIA: ICD-10-CM

## 2018-03-06 LAB
ALBUMIN SERPL BCP-MCNC: 2.3 G/DL
ALP SERPL-CCNC: 70 U/L
ALT SERPL W/O P-5'-P-CCNC: 15 U/L
ANION GAP SERPL CALC-SCNC: 14 MMOL/L
AST SERPL-CCNC: 17 U/L
BACTERIA #/AREA URNS HPF: ABNORMAL /HPF
BASOPHILS # BLD AUTO: 0 K/UL
BASOPHILS NFR BLD: 0.3 %
BILIRUB SERPL-MCNC: 0.4 MG/DL
BILIRUB UR QL STRIP: NEGATIVE
BUN SERPL-MCNC: 20 MG/DL
CALCIUM SERPL-MCNC: 9 MG/DL
CHLORIDE SERPL-SCNC: 108 MMOL/L
CLARITY UR: ABNORMAL
CO2 SERPL-SCNC: 24 MMOL/L
COLOR UR: YELLOW
CREAT SERPL-MCNC: 0.8 MG/DL
DIFFERENTIAL METHOD: ABNORMAL
EOSINOPHIL # BLD AUTO: 0 K/UL
EOSINOPHIL NFR BLD: 0.1 %
ERYTHROCYTE [DISTWIDTH] IN BLOOD BY AUTOMATED COUNT: 15.2 %
EST. GFR  (AFRICAN AMERICAN): >60 ML/MIN/1.73 M^2
EST. GFR  (NON AFRICAN AMERICAN): >60 ML/MIN/1.73 M^2
GLUCOSE SERPL-MCNC: 93 MG/DL
GLUCOSE UR QL STRIP: NEGATIVE
HCT VFR BLD AUTO: 30 %
HGB BLD-MCNC: 9.9 G/DL
HGB UR QL STRIP: ABNORMAL
HYALINE CASTS #/AREA URNS LPF: 1 /LPF
KETONES UR QL STRIP: ABNORMAL
LACTATE SERPL-SCNC: 1.3 MMOL/L
LEUKOCYTE ESTERASE UR QL STRIP: ABNORMAL
LYMPHOCYTES # BLD AUTO: 1.1 K/UL
LYMPHOCYTES NFR BLD: 16.2 %
MAGNESIUM SERPL-MCNC: 1.9 MG/DL
MCH RBC QN AUTO: 30 PG
MCHC RBC AUTO-ENTMCNC: 33.1 G/DL
MCV RBC AUTO: 91 FL
MICROSCOPIC COMMENT: ABNORMAL
MONOCYTES # BLD AUTO: 0.2 K/UL
MONOCYTES NFR BLD: 3.6 %
NEUTROPHILS # BLD AUTO: 5.5 K/UL
NEUTROPHILS NFR BLD: 79.8 %
NITRITE UR QL STRIP: POSITIVE
PH UR STRIP: 6 [PH] (ref 5–8)
PHOSPHATE SERPL-MCNC: 1.9 MG/DL
PLATELET # BLD AUTO: 193 K/UL
PLATELET BLD QL SMEAR: ABNORMAL
PMV BLD AUTO: 7.2 FL
POTASSIUM SERPL-SCNC: 3.6 MMOL/L
PROT SERPL-MCNC: 6.8 G/DL
PROT UR QL STRIP: ABNORMAL
RBC # BLD AUTO: 3.32 M/UL
RBC #/AREA URNS HPF: 8 /HPF (ref 0–4)
SODIUM SERPL-SCNC: 146 MMOL/L
SP GR UR STRIP: 1.02 (ref 1–1.03)
SQUAMOUS #/AREA URNS HPF: 2 /HPF
URN SPEC COLLECT METH UR: ABNORMAL
UROBILINOGEN UR STRIP-ACNC: NEGATIVE EU/DL
WBC # BLD AUTO: 6.9 K/UL
WBC #/AREA URNS HPF: 35 /HPF (ref 0–5)

## 2018-03-06 PROCEDURE — 96374 THER/PROPH/DIAG INJ IV PUSH: CPT

## 2018-03-06 PROCEDURE — 36415 COLL VENOUS BLD VENIPUNCTURE: CPT

## 2018-03-06 PROCEDURE — 85730 THROMBOPLASTIN TIME PARTIAL: CPT

## 2018-03-06 PROCEDURE — 85610 PROTHROMBIN TIME: CPT

## 2018-03-06 PROCEDURE — 96361 HYDRATE IV INFUSION ADD-ON: CPT | Mod: 59

## 2018-03-06 PROCEDURE — 87086 URINE CULTURE/COLONY COUNT: CPT

## 2018-03-06 PROCEDURE — 83735 ASSAY OF MAGNESIUM: CPT

## 2018-03-06 PROCEDURE — 80053 COMPREHEN METABOLIC PANEL: CPT

## 2018-03-06 PROCEDURE — 83036 HEMOGLOBIN GLYCOSYLATED A1C: CPT

## 2018-03-06 PROCEDURE — 20600001 HC STEP DOWN PRIVATE ROOM

## 2018-03-06 PROCEDURE — 84100 ASSAY OF PHOSPHORUS: CPT

## 2018-03-06 PROCEDURE — 85025 COMPLETE CBC W/AUTO DIFF WBC: CPT

## 2018-03-06 PROCEDURE — 99285 EMERGENCY DEPT VISIT HI MDM: CPT | Mod: 25

## 2018-03-06 PROCEDURE — P9612 CATHETERIZE FOR URINE SPEC: HCPCS

## 2018-03-06 PROCEDURE — 81000 URINALYSIS NONAUTO W/SCOPE: CPT

## 2018-03-06 PROCEDURE — 83605 ASSAY OF LACTIC ACID: CPT

## 2018-03-06 PROCEDURE — 25000003 PHARM REV CODE 250: Performed by: EMERGENCY MEDICINE

## 2018-03-06 PROCEDURE — 63600175 PHARM REV CODE 636 W HCPCS: Performed by: EMERGENCY MEDICINE

## 2018-03-06 PROCEDURE — 93005 ELECTROCARDIOGRAM TRACING: CPT

## 2018-03-06 RX ORDER — CEFTRIAXONE 1 G/1
1 INJECTION, POWDER, FOR SOLUTION INTRAMUSCULAR; INTRAVENOUS
Status: COMPLETED | OUTPATIENT
Start: 2018-03-06 | End: 2018-03-06

## 2018-03-06 RX ORDER — BUSPIRONE HYDROCHLORIDE 15 MG/1
15 TABLET ORAL DAILY
Status: ON HOLD | COMMUNITY
End: 2018-03-07

## 2018-03-06 RX ORDER — SIMVASTATIN 20 MG/1
20 TABLET, FILM COATED ORAL DAILY
Status: ON HOLD | COMMUNITY
End: 2018-03-07

## 2018-03-06 RX ORDER — GLUCAGON 1 MG
1 KIT INJECTION
Status: DISCONTINUED | OUTPATIENT
Start: 2018-03-07 | End: 2018-03-22 | Stop reason: HOSPADM

## 2018-03-06 RX ORDER — LANOLIN ALCOHOL/MO/W.PET/CERES
400 CREAM (GRAM) TOPICAL 2 TIMES DAILY
Status: DISCONTINUED | OUTPATIENT
Start: 2018-03-07 | End: 2018-03-15

## 2018-03-06 RX ORDER — SODIUM CHLORIDE 0.9 % (FLUSH) 0.9 %
5 SYRINGE (ML) INJECTION
Status: DISCONTINUED | OUTPATIENT
Start: 2018-03-07 | End: 2018-03-22 | Stop reason: HOSPADM

## 2018-03-06 RX ORDER — ATORVASTATIN CALCIUM 20 MG/1
40 TABLET, FILM COATED ORAL DAILY
Status: DISCONTINUED | OUTPATIENT
Start: 2018-03-07 | End: 2018-03-22 | Stop reason: HOSPADM

## 2018-03-06 RX ORDER — RAMELTEON 8 MG/1
8 TABLET ORAL NIGHTLY PRN
Status: DISCONTINUED | OUTPATIENT
Start: 2018-03-07 | End: 2018-03-22 | Stop reason: HOSPADM

## 2018-03-06 RX ORDER — IBUPROFEN 200 MG
24 TABLET ORAL
Status: DISCONTINUED | OUTPATIENT
Start: 2018-03-07 | End: 2018-03-22 | Stop reason: HOSPADM

## 2018-03-06 RX ORDER — ACETAMINOPHEN 325 MG/1
650 TABLET ORAL EVERY 4 HOURS PRN
Status: DISCONTINUED | OUTPATIENT
Start: 2018-03-07 | End: 2018-03-12

## 2018-03-06 RX ORDER — IBUPROFEN 200 MG
16 TABLET ORAL
Status: DISCONTINUED | OUTPATIENT
Start: 2018-03-07 | End: 2018-03-22 | Stop reason: HOSPADM

## 2018-03-06 RX ADMIN — SODIUM CHLORIDE 1000 ML: 0.9 INJECTION, SOLUTION INTRAVENOUS at 03:03

## 2018-03-06 RX ADMIN — CEFTRIAXONE SODIUM 1 G: 1 INJECTION, POWDER, FOR SOLUTION INTRAMUSCULAR; INTRAVENOUS at 05:03

## 2018-03-06 NOTE — ED NOTES
"Patient able to roll side to side for changing of her brief. When asked if family attempts to help patient with ADL's or getting out of bed, she states, "No."   "

## 2018-03-06 NOTE — ED NOTES
Dr. Goff has called patient's son to give an update that patient will be transferred to [facility].

## 2018-03-06 NOTE — ED NOTES
Jasmin the house supervisor is aware that speech therapist has not arrived to perform bedside swallow study.

## 2018-03-06 NOTE — ED NOTES
Patient given a teaspoon of pudding. Patient initially refused to swallow and moved pudding around with tongue.

## 2018-03-06 NOTE — ED NOTES
"Presents to the ER with c/o failure to thrive that has been persistent for the past 3 days. Patient was seen at [facility] for similar complaint and discharged 3 days. Family reports that they feel that patient was discharged too soon. Patient's family member reports that home health was to be set up, but "Something was messed up and they haven's seen her." Associated complaints are altered mental status, difficulty swallowing and decreased appetite. Patient arrived to ED room 11 with gipson catheter in place that was anchored to left upper thigh and dry brief in place. Stage II to bilateral gluteal fold. AAO to self only. Mucous membranes are pink and dry Skin is warm and intact. Lungs are clear bilaterally, respirations are regular and unlabored. Denies cough, congestion, rhinorrhea or SOB. BS active x4, no tenderness with palpation, abd is soft and not distended. Denies any appetite or activity change. S1S2, capillary refill is < 2 seconds. Denies dysuria, difficulty urinating, frequency, numbness, tingling or weakness. NAND VSS    Patient is able to answer questions with a whisper.     "

## 2018-03-06 NOTE — ED PROVIDER NOTES
Encounter Date: 3/6/2018    SCRIBE #1 NOTE: I, Parisa Goodrich, am scribing for, and in the presence of, Dr. Goff.       History     Chief Complaint   Patient presents with    Altered Mental Status     recent admission for metabolic encephalopathy    Failure To Thrive     03/06/2018  1:50 PM     Chief Complaint: Failure to Thrive    Lisbeth Seaman is a 63 y.o. female who has pmhx HLD, HTN, vascular dementia and stroke is presenting to ED for evaluation failure to thrive since discharge from hospital three days ago. Daughter reports that pt has been unable to eat or drink, decrease mobility secondary to weakness, and confusion. She also reports decreased bowel movements. Family thinks that it was inappropriate to allow pt to be discharged. Family thinks that pt needs to be hospitalized due to declining health since discharge. Family contacted PCP today, who advised pt to go to ED for further evaluation.            The history is provided by a relative and the patient.     Review of patient's allergies indicates:  No Known Allergies  Past Medical History:   Diagnosis Date    Hyperlipidemia     Hypertension     Stroke 09/2017    residual deficits are ambulates on her own with a limp, but has limited use of her right arm; emotional lability    Vascular dementia     since 9/2017 stroke     No past surgical history on file.  No family history on file.  Social History   Substance Use Topics    Smoking status: Former Smoker    Smokeless tobacco: Not on file    Alcohol use 8.4 oz/week     14 Shots of liquor per week     Review of Systems   Constitutional: Positive for appetite change. Negative for fever.   HENT: Positive for trouble swallowing. Negative for sore throat.    Eyes: Negative for visual disturbance.   Respiratory: Negative for cough.    Cardiovascular: Negative for chest pain.   Gastrointestinal: Negative for abdominal pain, diarrhea, nausea and vomiting.   Genitourinary: Negative for difficulty urinating  and pelvic pain.   Musculoskeletal: Negative for arthralgias.   Skin: Negative for rash.   Neurological: Positive for weakness.   Psychiatric/Behavioral: Negative for confusion.       Physical Exam     Initial Vitals [03/06/18 1238]   BP Pulse Resp Temp SpO2   112/89 108 18 98.1 °F (36.7 °C) 97 %      MAP       96.67         Physical Exam    Nursing note and vitals reviewed.  Constitutional: She appears well-developed and well-nourished. She appears ill (chronically).   Weak.    HENT:   Head: Normocephalic and atraumatic.   Mouth/Throat: Mucous membranes are dry.   Eyes: EOM are normal. Pupils are equal, round, and reactive to light.   Neck: Normal range of motion. Neck supple. No neck rigidity.   Cardiovascular: Normal rate, regular rhythm, normal heart sounds and intact distal pulses.   Pulmonary/Chest: Breath sounds normal. No respiratory distress. She has no wheezes. She has no rhonchi. She has no rales.   Abdominal: Soft. Bowel sounds are normal. She exhibits no distension. There is no tenderness. There is no rigidity.   Musculoskeletal: Normal range of motion.   Neurological: No cranial nerve deficit.   Alert to self person, but not place or time. 4/5 strength symmetrically. 3/5 strength bilaterally.    Skin: Skin is warm and dry.         ED Course   Procedures  Labs Reviewed   CBC W/ AUTO DIFFERENTIAL - Abnormal; Notable for the following:        Result Value    RBC 3.32 (*)     Hemoglobin 9.9 (*)     Hematocrit 30.0 (*)     RDW 15.2 (*)     MPV 7.2 (*)     Mono # 0.2 (*)     Gran% 79.8 (*)     Lymph% 16.2 (*)     Mono% 3.6 (*)     All other components within normal limits   COMPREHENSIVE METABOLIC PANEL - Abnormal; Notable for the following:     Sodium 146 (*)     Albumin 2.3 (*)     All other components within normal limits   URINALYSIS - Abnormal; Notable for the following:     Appearance, UA Hazy (*)     Protein, UA 1+ (*)     Ketones, UA 1+ (*)     Occult Blood UA 2+ (*)     Nitrite, UA Positive (*)      Leukocytes, UA 1+ (*)     All other components within normal limits   PHOSPHORUS - Abnormal; Notable for the following:     Phosphorus 1.9 (*)     All other components within normal limits   URINALYSIS MICROSCOPIC - Abnormal; Notable for the following:     RBC, UA 8 (*)     WBC, UA 35 (*)     Bacteria, UA Many (*)     All other components within normal limits   CULTURE, URINE   MAGNESIUM   LACTIC ACID, PLASMA     EKG Readings: (Independently Interpreted)   16:33 - Normal Sinus Rhythm at 92 bpm. Normal st segments and non-specific t wave flattening.      Imaging Results          CT Head Without Contrast (Final result)  Result time 03/06/18 14:40:40    Final result by Laz Smith MD (03/06/18 14:40:40)                 Impression:      1.  There is no acute abnormality.  There is no change compared to head CT dated 03/01/2018.  There is moderate generalized volume loss with marked nonspecific diffuse and confluent white matter change which likely reflect sequelae of severe microvascular ischemic disease.  There is a chronic infarction in the lateral right cerebellum.  There also chronic infarctions in the basal ganglia and thalami.  There is no hemorrhage, mass or obvious acute infarction.  It should be noted that MRI is more sensitive in the detection of subtle or acute nonhemorrhagic ischemic disease.      Electronically signed by: Laz Smith MD  Date:    03/06/2018  Time:    14:40             Narrative:    EXAMINATION:  CT HEAD WITHOUT CONTRAST    CLINICAL HISTORY:  Dementia, normal pressure hydrocephalus suspected; altered mental status    TECHNIQUE:  Routine unenhanced axial images were obtained through the head.  Sagittal and coronal reformatted images were created.  The study is reviewed in bone and soft tissue windows.    COMPARISON:  Head CT dated 03/01/2018    FINDINGS:  Intracranial contents: There is no acute intracranial abnormality or definite change in the appearance of the brain compared  to the recent prior study dated 03/01/2018.  There is moderate generalized volume loss.  There is a marked burden of diffuse and confluent white matter decreased attenuation with ex vacuo or compensatory dilatation of the ventricles.  There is encephalomalacia in the lateral right cerebellum consistent with remote vascular or traumatic insult.  This was present previously.  There are also chronic infarctions in the thalami, right greater than left.  There is no midline shift.  The imaging findings are not suggestive of normal pressure hydrocephalus.  There is no hemorrhage.  There is no mass.  There is no obvious acute infarction.  There are chronic basal ganglia lacunar infarctions.  There is no abnormal extra-axial fluid collection.  The basilar cisterns are open.  The cerebellar tonsils are in normal position.  The sellar structures are normal.  The orbits are grossly normal.    Extracranial contents, calvarium, soft tissues: The calvarium is normal.  The included paranasal sinuses and mastoid air cells are clear.                                   Medical Decision Making:   History:   Old Medical Records: I decided to obtain old medical records.  Old Records Summarized: records from previous admission(s).       <> Summary of Records: Pt was admitted to hospital for NPH (Normal Pressure Hydrocephalus) and hypernatremia. Treated with IV fluids and a therapeutic LP inpatient, which improved her mental status and was sent home with home health and a neuro followup. Had plans for a  shunt, but developed a GI bleed.  Initial Assessment:   63-year-old woman with a history of CVA in 2017 and resultant and NPH, and believed to have vascular dementia presents with refusing to ED and decreased oral intake after being sent home from the hospital 3 days ago for treatment of metabolic encephalopathy and severe AK I.  Believe that the patient needs a ventricular shunt placed.  Review of previous notes reveal that she been  value by neurosurgery however the impression was that the patient had been evaluated by neurosurgeon in [place] and was planned to have a  shunt by that person.  I discussed with patient and with patient's son who states they have not actually seen the neurosurgeon yet and the appointment was for evaluation.  They're wishing to have a  shunt placed as soon as possible, I'm in agreement and believe that the normal pressure hydrocephalus may be contributing to her decline in a  shunt would be a reasonable therapeutic option.  Laboratory evaluation shows no evidence of gray-black slight abnormalities or any AK I or significant dehydration.  She does have nitrite positive UTI.  Urine culture sent and patient started on Rocephin in the ED.  Discussed with Dr. Alexander who suggests transfer to [facility] for treatment of the UTI, medical clearance and shunt placement later this week.  Discussed with hospitalist at [facility] who agrees to have the patient transferred there for admission.  Clinical Tests:   Lab Tests: Ordered and Reviewed  Radiological Study: Ordered and Reviewed  Medical Tests: Ordered and Reviewed            Scribe Attestation:   Scribe #1: I performed the above scribed service and the documentation accurately describes the services I performed. I attest to the accuracy of the note.    I, Denver Viera, personally performed the services described in this documentation. All medical record entries made by the scribe were at my direction and in my presence.  I have reviewed the chart and agree that the record reflects my personal performance and is accurate and complete. Jacob Goff MD.  8:26 PM 03/06/2018             Clinical Impression:     1. NPH (normal pressure hydrocephalus)    2. Weakness    3. Urinary tract infection associated with indwelling urethral catheter, initial encounter                                 Jacob Goff MD  03/06/18 2026

## 2018-03-07 PROBLEM — N30.01 ACUTE CYSTITIS WITH HEMATURIA: Status: ACTIVE | Noted: 2018-03-07

## 2018-03-07 PROBLEM — N39.0 UTI (URINARY TRACT INFECTION): Status: ACTIVE | Noted: 2018-03-07

## 2018-03-07 LAB
ANION GAP SERPL CALC-SCNC: 17 MMOL/L
APTT BLDCRRT: 21.1 SEC
BUN SERPL-MCNC: 15 MG/DL
CALCIUM SERPL-MCNC: 8.5 MG/DL
CHLORIDE SERPL-SCNC: 106 MMOL/L
CMV SPEC QL SHELL VIAL CULT: NO GROWTH
CO2 SERPL-SCNC: 20 MMOL/L
CREAT SERPL-MCNC: 0.6 MG/DL
EST. GFR  (AFRICAN AMERICAN): >60 ML/MIN/1.73 M^2
EST. GFR  (NON AFRICAN AMERICAN): >60 ML/MIN/1.73 M^2
ESTIMATED AVG GLUCOSE: 105 MG/DL
GLUCOSE SERPL-MCNC: 90 MG/DL
GRAM STN SPEC: NORMAL
HBA1C MFR BLD HPLC: 5.3 %
INR PPP: 1
POTASSIUM SERPL-SCNC: 4.2 MMOL/L
PROTHROMBIN TIME: 10.1 SEC
SODIUM SERPL-SCNC: 143 MMOL/L

## 2018-03-07 PROCEDURE — 63600175 PHARM REV CODE 636 W HCPCS: Performed by: STUDENT IN AN ORGANIZED HEALTH CARE EDUCATION/TRAINING PROGRAM

## 2018-03-07 PROCEDURE — 25000003 PHARM REV CODE 250: Performed by: STUDENT IN AN ORGANIZED HEALTH CARE EDUCATION/TRAINING PROGRAM

## 2018-03-07 PROCEDURE — 99223 1ST HOSP IP/OBS HIGH 75: CPT | Mod: ,,, | Performed by: HOSPITALIST

## 2018-03-07 PROCEDURE — 97166 OT EVAL MOD COMPLEX 45 MIN: CPT

## 2018-03-07 PROCEDURE — 80048 BASIC METABOLIC PNL TOTAL CA: CPT

## 2018-03-07 PROCEDURE — 25000003 PHARM REV CODE 250: Performed by: INTERNAL MEDICINE

## 2018-03-07 PROCEDURE — 36415 COLL VENOUS BLD VENIPUNCTURE: CPT

## 2018-03-07 PROCEDURE — 92610 EVALUATE SWALLOWING FUNCTION: CPT

## 2018-03-07 PROCEDURE — 97802 MEDICAL NUTRITION INDIV IN: CPT

## 2018-03-07 PROCEDURE — 97162 PT EVAL MOD COMPLEX 30 MIN: CPT

## 2018-03-07 PROCEDURE — 20600001 HC STEP DOWN PRIVATE ROOM

## 2018-03-07 RX ORDER — NAPROXEN SODIUM 220 MG/1
81 TABLET, FILM COATED ORAL DAILY
COMMUNITY

## 2018-03-07 RX ORDER — CEFTRIAXONE 1 G/1
1 INJECTION, POWDER, FOR SOLUTION INTRAMUSCULAR; INTRAVENOUS
Status: DISCONTINUED | OUTPATIENT
Start: 2018-03-08 | End: 2018-03-09

## 2018-03-07 RX ORDER — CEFTRIAXONE 1 G/1
1 INJECTION, POWDER, FOR SOLUTION INTRAMUSCULAR; INTRAVENOUS
Status: DISCONTINUED | OUTPATIENT
Start: 2018-03-07 | End: 2018-03-07

## 2018-03-07 RX ORDER — DEXTROSE MONOHYDRATE 50 MG/ML
INJECTION, SOLUTION INTRAVENOUS CONTINUOUS
Status: DISCONTINUED | OUTPATIENT
Start: 2018-03-07 | End: 2018-03-07

## 2018-03-07 RX ORDER — DEXTROSE MONOHYDRATE 50 MG/ML
INJECTION, SOLUTION INTRAVENOUS CONTINUOUS
Status: ACTIVE | OUTPATIENT
Start: 2018-03-07 | End: 2018-03-07

## 2018-03-07 RX ADMIN — CEFTRIAXONE SODIUM 1 G: 1 INJECTION, POWDER, FOR SOLUTION INTRAMUSCULAR; INTRAVENOUS at 01:03

## 2018-03-07 RX ADMIN — DEXTROSE: 5 SOLUTION INTRAVENOUS at 09:03

## 2018-03-07 RX ADMIN — Medication 400 MG: at 09:03

## 2018-03-07 RX ADMIN — BUSPIRONE HYDROCHLORIDE 15 MG: 10 TABLET ORAL at 05:03

## 2018-03-07 RX ADMIN — POTASSIUM PHOSPHATE, MONOBASIC AND POTASSIUM PHOSPHATE, DIBASIC 30 MMOL: 224; 236 INJECTION, SOLUTION, CONCENTRATE INTRAVENOUS at 09:03

## 2018-03-07 RX ADMIN — ATORVASTATIN CALCIUM 40 MG: 20 TABLET, FILM COATED ORAL at 09:03

## 2018-03-07 NOTE — HPI
Lisbeth Seaman is a 63 y.o. Female with extensive PMH who has pmhx HLD, HTN, vascular dementia and stroke is presenting to [facility] for evaluation failure to thrive since discharge from hospital three days ago. Patient is also reported to have history of NPH and needs neurosurgery evaluation for treatment. Daughter reports that pt has been unable to eat or drink, decrease mobility secondary to weakness, and confusion. She also reports decreased bowel movements however this is likely 2/2 to decreased PO intake as patient does have positive bowel sounds in all 4 quadrants. Family thinks that pt needs to be hospitalized due to declining health since discharge. Family contacted PCP today, who advised pt to go to ED for further evaluation.      Patient non-verbal on physical exam, oriented only to person. Family not at bedside and unable to be reached by phone.

## 2018-03-07 NOTE — ASSESSMENT & PLAN NOTE
Nutrition Problem:  Inadequate oral intake    Related to (etiology):   Swallowing/chewing difficulty, increased physiological needs    Signs and Symptoms (as evidenced by):   Dysphagia, 2x PU of gluteal region     Interventions/Recommendations (treatment strategy):  See recs above    Nutrition Diagnosis Status:   New

## 2018-03-07 NOTE — ED NOTES
Report given to [facility] Paramedic, unit [phone]. No needs or questions from patient at this time. Patient is aware that her son has been called and is aware that he will be transferred.

## 2018-03-07 NOTE — ASSESSMENT & PLAN NOTE
-ceftriaxone 1g Q12  -UA positive for nitrites, many bacteria and 35 WBC  -culture pending  -CBC white count 6.90

## 2018-03-07 NOTE — PT/OT/SLP EVAL
Speech Language Pathology Evaluation  Bedside Swallow/Discharge    Patient Name:  Lisbeth Seaman   MRN:  [id number]  Admitting Diagnosis: Encephalopathy, metabolic    Recommendations:                 General Recommendations:  Follow-up not indicated  Diet recommendations:  Dental Soft, Thin   Aspiration Precautions: Standard aspiration precautions   General Precautions: Standard, aspiration, fall  Communication strategies:  none    History:     Past Medical History:   Diagnosis Date    Hyperlipidemia     Hypertension     Stroke 09/2017    residual deficits are ambulates on her own with a limp, but has limited use of her right arm; emotional lability    Vascular dementia     since 9/2017 stroke       History reviewed. No pertinent surgical history.    Social History: Patient lives with son .    Prior diet:Dental soft/thin.    Subjective     Awake/alert    Pain/Comfort:  · Pain Rating 1: 0/10  · Pain Rating Post-Intervention 1: 0/10    Objective:     Oral Musculature Evaluation  · Oral Musculature: WFL  · Dentition: scattered dentition  · Mucosal Quality: good  · Mandibular Strength and Mobility: WFL  · Oral Labial Strength and Mobility: WFL  · Lingual Strength and Mobility: WFL  · Volitional Cough: weak  · Volitional Swallow: adequate  · Voice Prior to PO Intake: low intensity    Bedside Swallow Eval:   Consistencies Assessed:  · Thin liquids  cup x3. x2 staw  · Puree x2  · Solids x1     Oral Phase:   · Slow oral transit time  · Prolonged mastication  · Adequate oral clearance    Pharyngeal Phase:   · decreased hyolaryngeal excursion to palpation  · no overt clinical signs/symptoms of aspiration with puree, thin and solid trials      Assessment:     Lisbeth Seaman is a 63 y.o. female with oral and pharyngeal swallow function deemed WFL.    Goals:    SLP Goals        Problem: SLP Goal    Goal Priority Disciplines Outcome   SLP Goal     SLP                    Plan:     · Plan of Care reviewed with:  patient ,  son  · SLP Follow-Up:  No       Discharge recommendations:    No St f/u    Time Tracking:     SLP Treatment Date:   03/07/18  Speech Start Time:  1104  Speech Stop Time:  1114     Speech Total Time (min):  10 min    Billable Minutes: Eval Swallow and Oral Function 10    Mary Adams CCC-SLP  03/07/2018

## 2018-03-07 NOTE — ASSESSMENT & PLAN NOTE
-reported from outside hospital  -will need neurosurg evaluation   -holding anticoagulation at this time

## 2018-03-07 NOTE — NURSING
IM1 paged after PCT alerted RN to foul smelling,red tinged stool with dark tarry elements. Per Dr. Torey guthrie to send stool sample. RN to cont to monitor symptoms and lab work.

## 2018-03-07 NOTE — PLAN OF CARE
Problem: SLP Goal  Goal: SLP Goal  Bedside swallow evaluation completed. No further ST recommended at this time.   Mary Adams CCC-SLP  3/7/2018

## 2018-03-07 NOTE — ED NOTES
Patient is resting in bed with eyes closed, chest rise is symmetrical, respirations are regular and unlabored. BABITA JAMES

## 2018-03-07 NOTE — H&P
[facility] Hospital Medicine  History & Physical    Patient Name: Lisbeth Seaman  MRN: [id number]  Admission Date: 3/6/2018  Attending Physician: Ovi Cordoba MD   Primary Care Provider: Rob Valladares MD    Hospital Medicine Team: [facility] MED 1 Mi Lema MD     Patient information was obtained from past medical records and ER records.     Subjective:     Principal Problem:Encephalopathy, metabolic    Chief Complaint: No chief complaint on file.       HPI: Lisbeth Seaman is a 63 y.o. Female with extensive PMH who has pmhx HLD, HTN, vascular dementia and stroke is presenting to [facility] for evaluation failure to thrive since discharge from hospital three days ago. Patient is also reported to have history of NPH and needs neurosurgery evaluation for treatment. Daughter reports that pt has been unable to eat or drink, decrease mobility secondary to weakness, and confusion. She also reports decreased bowel movements however this is likely 2/2 to decreased PO intake as patient does have positive bowel sounds in all 4 quadrants. Family thinks that pt needs to be hospitalized due to declining health since discharge. Family contacted PCP today, who advised pt to go to ED for further evaluation.      Patient non-verbal on physical exam, oriented only to person. Family not at bedside and unable to be reached by phone.     Past Medical History:   Diagnosis Date    Hyperlipidemia     Hypertension     Stroke 09/2017    residual deficits are ambulates on her own with a limp, but has limited use of her right arm; emotional lability    Vascular dementia     since 9/2017 stroke       No past surgical history on file.    Review of patient's allergies indicates:  No Known Allergies    Current Facility-Administered Medications on File Prior to Encounter   Medication    [COMPLETED] cefTRIAXone injection 1 g    [COMPLETED] sodium chloride 0.9% bolus 1,000 mL     Current Outpatient Prescriptions on  File Prior to Encounter   Medication Sig    atorvastatin (LIPITOR) 40 MG tablet Take 40 mg by mouth once daily.     magnesium oxide (MAG-OX) 400 mg tablet Take 1 tablet (400 mg total) by mouth 2 (two) times daily.    potassium chloride (K-TAB) 20 mEq Take 20 mEq by mouth once daily.     [DISCONTINUED] aspirin 81 MG Chew Take 81 mg by mouth once daily.     Family History     None        Social History Main Topics    Smoking status: Former Smoker    Smokeless tobacco: Not on file    Alcohol use 8.4 oz/week     14 Shots of liquor per week    Drug use: No    Sexual activity: Not on file     Review of Systems   Unable to perform ROS: Patient nonverbal     Objective:     Vital Signs (Most Recent):  Temp: 96.9 °F (36.1 °C) (03/06/18 2300)  Pulse: 75 (03/06/18 2300)  Resp: 18 (03/06/18 2300)  BP: (!) 139/90 (03/06/18 2300)  SpO2: 99 % (03/06/18 2300) Vital Signs (24h Range):  Temp:  [96.9 °F (36.1 °C)-98.6 °F (37 °C)] 96.9 °F (36.1 °C)  Pulse:  [] 75  Resp:  [16-18] 18  SpO2:  [95 %-99 %] 99 %  BP: (112-159)/(77-96) 139/90        There is no height or weight on file to calculate BMI.    Physical Exam   Constitutional: She appears well-developed. No distress.   HENT:   Head: Normocephalic and atraumatic.   Mouth/Throat: No oropharyngeal exudate.   Eyes: EOM are normal. Pupils are equal, round, and reactive to light. No scleral icterus.   Neck: Normal range of motion. Neck supple.   Cardiovascular: Normal rate, regular rhythm, normal heart sounds and intact distal pulses.    No murmur heard.  Pulmonary/Chest: Effort normal. No respiratory distress. She has wheezes.   Abdominal: Soft. Bowel sounds are normal. She exhibits no distension.   Musculoskeletal: Normal range of motion. She exhibits no edema.   Lymphadenopathy:     She has no cervical adenopathy.   Neurological: She is alert.   Skin: Skin is warm. She is not diaphoretic.   Diffuse ecchymoses and purpura    Psychiatric:   Non-verbal other than stating  her own name     Vitals reviewed.        CRANIAL NERVES     CN III, IV, VI   Pupils are equal, round, and reactive to light.  Extraocular motions are normal.        Significant Labs:   Recent Lab Results       03/06/18  1525 03/06/18  1420 03/06/18  1419      Albumin   2.3(L)     Alkaline Phosphatase   70     ALT   15     Anion Gap   14     Appearance, UA Hazy(A)       AST   17     Bacteria, UA Many(A)       Baso #  0.00      Basophil%  0.3      Bilirubin (UA) Negative       Total Bilirubin   0.4  Comment:  For infants and newborns, interpretation of results should be based  on gestational age, weight and in agreement with clinical  observations.  Premature Infant recommended reference ranges:  Up to 24 hours.............<8.0 mg/dL  Up to 48 hours............<12.0 mg/dL  3-5 days..................<15.0 mg/dL  6-29 days.................<15.0 mg/dL       BUN, Bld   20     Calcium   9.0     Chloride   108     CO2   24     Color, UA Yellow       Creatinine   0.8     Differential Method  Automated      eGFR if    >60     eGFR if non    >60  Comment:  Calculation used to obtain the estimated glomerular filtration  rate (eGFR) is the CKD-EPI equation.        Eos #  0.0      Eosinophil%  0.1      Glucose   93     Glucose, UA Negative       Gran # (ANC)  5.5      Gran%  79.8(H)      Hematocrit  30.0(L)      Hemoglobin  9.9(L)      Hyaline Casts, UA 1       Ketones, UA 1+(A)       Lactate, Carl   1.3  Comment:  Falsely low lactic acid results can be found in samples   containing >=13.0 mg/dL total bilirubin and/or >=3.5 mg/dL   direct bilirubin.       Leukocytes, UA 1+(A)       Lymph #  1.1      Lymph%  16.2(L)      Magnesium   1.9     MCH  30.0      MCHC  33.1      MCV  91      Microscopic Comment SEE COMMENT  Comment:  Other formed elements not mentioned in the report are not   present in the microscopic examination.          Mono #  0.2(L)      Mono%  3.6(L)      MPV  7.2(L)      Nitrite, UA  Positive(A)       Occult Blood UA 2+(A)       pH, UA 6.0       Phosphorus   1.9(L)     Platelet Estimate  Appears normal      Platelets  193      Potassium   3.6     Total Protein   6.8     Protein, UA 1+  Comment:  Recommend a 24 hour urine protein or a urine   protein/creatinine ratio if globulin induced proteinuria is  clinically suspected.  (A)       RBC  3.32(L)      RBC, UA 8(H)       RDW  15.2(H)      Sodium   146(H)     Specific Gravity, UA 1.025       Specimen UA Urine, Catheterized       Squam Epithel, UA 2       Urobilinogen, UA Negative       WBC, UA 35(H)       WBC  6.90            Significant Imaging: I have reviewed all pertinent imaging results/findings within the past 24 hours.    Assessment/Plan:     * Encephalopathy, metabolic    -patient reported to have NPH from outside hospital  -neurosurgery consult placed   -patient appears encephalopathic at time of presentation   -holding anticoagulation at this time           UTI (urinary tract infection)    -ceftriaxone 1g Q12  -UA positive for nitrites, many bacteria and 35 WBC  -culture pending  -CBC white count 6.90        Debility    -likely 2/2 to previous stroke  -PT/OT           Normal pressure hydrocephalus    -reported from outside hospital  -will need neurosurg evaluation   -holding anticoagulation at this time          Vascular dementia without behavioral disturbance    -continue Statin at this time  -Neurosurg consult placed   -neuro checks q4      Recent head Ct:  There is no acute abnormality.  There is no change compared to head CT dated 03/01/2018.  There is moderate generalized volume loss with marked nonspecific diffuse and confluent white matter change which likely reflect sequelae of severe microvascular ischemic disease.  There is a chronic infarction in the lateral right cerebellum.  There also chronic infarctions in the basal ganglia and thalami.  There is no hemorrhage, mass or obvious acute infarction.  It should be noted that MRI  is more sensitive in the detection of subtle or acute nonhemorrhagic ischemic disease.          VTE Risk Mitigation         Ordered     Medium Risk of VTE  Once      03/06/18 2340     Place CHANDRAKANT hose  Until discontinued      03/06/18 2340     Place sequential compression device  Until discontinued      03/06/18 2340         Plan discussed with attending Dr. Mantilla, further recommendations as per attending addendum. Please feel free to call with any questions or concerns.            Mi Lema MD  Department of Hospital Medicine   [facility]

## 2018-03-07 NOTE — PT/OT/SLP EVAL
Occupational Therapy   Evaluation/Treatment    Name: Lisbeth Seaman  MRN: [id number]  Admitting Diagnosis:  Encephalopathy, metabolic      Recommendations:     Discharge Recommendations: nursing facility, skilled  Discharge Equipment Recommendations:   (TBD once currently owned equipment is established)  Barriers to discharge:  Other (Comment) (increased level of assistance required )    History:     Occupational Profile: Pt AAO x person only; unable to provide history; will need to obtain information from family   Living Environment:   Previous level of function:   Roles and Routines:   Equipment Owned:   (need to confirm with family; pt unable to provide this information)  Assistance upon Discharge:     Past Medical History:   Diagnosis Date    Hyperlipidemia     Hypertension     Stroke 09/2017    residual deficits are ambulates on her own with a limp, but has limited use of her right arm; emotional lability    Vascular dementia     since 9/2017 stroke       History reviewed. No pertinent surgical history.    Subjective   Pt answers questions when asked in a whisper  Chief Complaint: none   Patient/Family stated goals: none stated   Communicated with: RN prior to session.  Pain/Comfort:  · Pain Rating 1: 0/10  · Pain Rating Post-Intervention 1: 0/10    Patients cultural, spiritual, Shinto conflicts given the current situation:      Objective:     Patient found with: telemetry    General Precautions: Standard, aspiration, fall   Orthopedic Precautions:N/A   Braces: N/A     Occupational Performance:    Bed Mobility:    · Patient completed Scooting to EOB with maximal assistance  · Patient completed Supine to Sit with maximal assistance    Functional Mobility/Transfers:  · Patient completed Sit <> Stand Transfer with maximal assistance  with  no assistive device ; pt with posterior lean in standing   · Pt completed squat pivot t/f EOB->bedside chair with maximal assistance     Activities of Daily  Living:  · Grooming: pt sat EOB with SBA and combed front of hair using RUE without physical assist; OT assisted with combing back of head due to matting; pt able to maintain static sitting balance with SBA during grooming activity   · LB Dressing: maximal assistance required to don socks from EOB    Cognitive/Visual Perceptual:  Cognitive/Psychosocial Skills:     -       Oriented to: Person   -       Follows Commands/attention:Follows one-step commands  -       Communication: minimal verbalizations; pt answered questions with short answers in a whisper  -       Memory: Impaired STM and Impaired LTM  -       Safety awareness/insight to disability: impaired   -       Mood/Affect/Coping skills/emotional control: Flat affect and Withdrawn    Physical Exam:  Balance:    -       static sitting- good; dynamic sitting: TBA; static standing- poor  Postural examination/scapula alignment:    -       Rounded shoulders  -       Forward head  -       Kyphosis  Skin integrity: Visible skin intact  Edema:  None noted  Sensation:    -       Intact  light/touch BUEs  Dominant hand:    -       Right  Upper Extremity Range of Motion:     -       Right Upper Extremity: WFL  -       Left Upper Extremity: WFL  Upper Extremity Strength:    -       Right Upper Extremity: 3/5 grossly  -       Left Upper Extremity: 3/5 grossly   Strength: WFL bilaterally  Fine Motor Coordination: WFL bilaterally     Patient left up in chair with all lines intact, call button in reach, chair alarm on and RN notified    AMPA 6 Click:  AMPAC Total Score: 15    Treatment & Education:  Pt educated on OT role/POC  Pt educated to call for assist with OOB activity; pt able to point to button to press on call light to call for assist without cues required  Education:    Assessment:     Lisbeth Seaman is a 63 y.o. female with a medical diagnosis of Encephalopathy, metabolic.  She presents with flat affect and limited attempts to communicate.  Pt was  "pleasant/cooperative following all commands/requests during OT evaluation.  Pt presents with generalized weakness, impaired dynamic sitting balance, impaired standing balance, and cognitive impairments affecting current occupational performance.       Performance deficits affecting function are weakness, impaired endurance, impaired self care skills, impaired functional mobilty, impaired balance, impaired cognition, gait instability, decreased safety awareness.      Rehab Prognosis:  good; patient would benefit from acute skilled OT services to address these deficits and reach maximum level of function.         Clinical Decision Makin.  OT Mod:  "Pt evaluation falls under moderate complexity for evaluation coding due to identification of 3-5 performance deficits noted as stated above. Eval required Min/Mod assistance to complete on this date and detailed assessment(s) were utilized. Moreover, an expanded review of history and occupational profile obtained with additional review of cognitive, physical and psychosocial hx."     Plan:     Patient to be seen 4 x/week to address the above listed problems via self-care/home management, therapeutic exercises, therapeutic activities, neuromuscular re-education, cognitive retraining  · Plan of Care Expires: 18  · Plan of Care Reviewed with: patient    This Plan of care has been discussed with the patient who was involved in its development and understands and is in agreement with the identified goals and treatment plan    GOALS:    Occupational Therapy Goals        Problem: Occupational Therapy Goal    Goal Priority Disciplines Outcome Interventions   Occupational Therapy Goal     OT, PT/OT Ongoing (interventions implemented as appropriate)    Description:  Goals to be met by: 7 days     Patient will increase functional independence with ADLs by performing:    Feeding with Set-up Assistance.  UE Dressing with Set-up Assistance.  LE Dressing with Moderate " Assistance.  Grooming while seated EOB with Stand-by/Set-up assistance.  Toileting from bedside commode with Moderate Assistance for hygiene and clothing management.   Supine to sit with Minimal Assistance.  Stand pivot transfer EOB->bedside chair with Moderate Assistance using RW.  Toilet transfer EOB->bedside commode with Moderate Assistance using RW.                      Time Tracking:     OT Date of Treatment: 03/07/18  OT Start Time: 0907  OT Stop Time: 0931  OT Total Time (min): 24 min   Co-eval with PT    Billable Minutes:Evaluation 24    EZEQUIEL Manuel  3/7/2018

## 2018-03-07 NOTE — SUBJECTIVE & OBJECTIVE
Past Medical History:   Diagnosis Date    Hyperlipidemia     Hypertension     Stroke 09/2017    residual deficits are ambulates on her own with a limp, but has limited use of her right arm; emotional lability    Vascular dementia     since 9/2017 stroke       No past surgical history on file.    Review of patient's allergies indicates:  No Known Allergies    Current Facility-Administered Medications on File Prior to Encounter   Medication    [COMPLETED] cefTRIAXone injection 1 g    [COMPLETED] sodium chloride 0.9% bolus 1,000 mL     Current Outpatient Prescriptions on File Prior to Encounter   Medication Sig    atorvastatin (LIPITOR) 40 MG tablet Take 40 mg by mouth once daily.     magnesium oxide (MAG-OX) 400 mg tablet Take 1 tablet (400 mg total) by mouth 2 (two) times daily.    potassium chloride (K-TAB) 20 mEq Take 20 mEq by mouth once daily.     [DISCONTINUED] aspirin 81 MG Chew Take 81 mg by mouth once daily.     Family History     None        Social History Main Topics    Smoking status: Former Smoker    Smokeless tobacco: Not on file    Alcohol use 8.4 oz/week     14 Shots of liquor per week    Drug use: No    Sexual activity: Not on file     Review of Systems   Unable to perform ROS: Patient nonverbal     Objective:     Vital Signs (Most Recent):  Temp: 96.9 °F (36.1 °C) (03/06/18 2300)  Pulse: 75 (03/06/18 2300)  Resp: 18 (03/06/18 2300)  BP: (!) 139/90 (03/06/18 2300)  SpO2: 99 % (03/06/18 2300) Vital Signs (24h Range):  Temp:  [96.9 °F (36.1 °C)-98.6 °F (37 °C)] 96.9 °F (36.1 °C)  Pulse:  [] 75  Resp:  [16-18] 18  SpO2:  [95 %-99 %] 99 %  BP: (112-159)/(77-96) 139/90        There is no height or weight on file to calculate BMI.    Physical Exam   Constitutional: She appears well-developed. No distress.   HENT:   Head: Normocephalic and atraumatic.   Mouth/Throat: No oropharyngeal exudate.   Eyes: EOM are normal. Pupils are equal, round, and reactive to light. No scleral icterus.    Neck: Normal range of motion. Neck supple.   Cardiovascular: Normal rate, regular rhythm, normal heart sounds and intact distal pulses.    No murmur heard.  Pulmonary/Chest: Effort normal. No respiratory distress. She has wheezes.   Abdominal: Soft. Bowel sounds are normal. She exhibits no distension.   Musculoskeletal: Normal range of motion. She exhibits no edema.   Lymphadenopathy:     She has no cervical adenopathy.   Neurological: She is alert.   Skin: Skin is warm. She is not diaphoretic.   Diffuse ecchymoses and purpura    Psychiatric:   Non-verbal other than stating her own name     Vitals reviewed.        CRANIAL NERVES     CN III, IV, VI   Pupils are equal, round, and reactive to light.  Extraocular motions are normal.        Significant Labs:   Recent Lab Results       03/06/18  1525 03/06/18  1420 03/06/18  1419      Albumin   2.3(L)     Alkaline Phosphatase   70     ALT   15     Anion Gap   14     Appearance, UA Hazy(A)       AST   17     Bacteria, UA Many(A)       Baso #  0.00      Basophil%  0.3      Bilirubin (UA) Negative       Total Bilirubin   0.4  Comment:  For infants and newborns, interpretation of results should be based  on gestational age, weight and in agreement with clinical  observations.  Premature Infant recommended reference ranges:  Up to 24 hours.............<8.0 mg/dL  Up to 48 hours............<12.0 mg/dL  3-5 days..................<15.0 mg/dL  6-29 days.................<15.0 mg/dL       BUN, Bld   20     Calcium   9.0     Chloride   108     CO2   24     Color, UA Yellow       Creatinine   0.8     Differential Method  Automated      eGFR if    >60     eGFR if non    >60  Comment:  Calculation used to obtain the estimated glomerular filtration  rate (eGFR) is the CKD-EPI equation.        Eos #  0.0      Eosinophil%  0.1      Glucose   93     Glucose, UA Negative       Gran # (ANC)  5.5      Gran%  79.8(H)      Hematocrit  30.0(L)      Hemoglobin   9.9(L)      Hyaline Casts, UA 1       Ketones, UA 1+(A)       Lactate, Carl   1.3  Comment:  Falsely low lactic acid results can be found in samples   containing >=13.0 mg/dL total bilirubin and/or >=3.5 mg/dL   direct bilirubin.       Leukocytes, UA 1+(A)       Lymph #  1.1      Lymph%  16.2(L)      Magnesium   1.9     MCH  30.0      MCHC  33.1      MCV  91      Microscopic Comment SEE COMMENT  Comment:  Other formed elements not mentioned in the report are not   present in the microscopic examination.          Mono #  0.2(L)      Mono%  3.6(L)      MPV  7.2(L)      Nitrite, UA Positive(A)       Occult Blood UA 2+(A)       pH, UA 6.0       Phosphorus   1.9(L)     Platelet Estimate  Appears normal      Platelets  193      Potassium   3.6     Total Protein   6.8     Protein, UA 1+  Comment:  Recommend a 24 hour urine protein or a urine   protein/creatinine ratio if globulin induced proteinuria is  clinically suspected.  (A)       RBC  3.32(L)      RBC, UA 8(H)       RDW  15.2(H)      Sodium   146(H)     Specific Gravity, UA 1.025       Specimen UA Urine, Catheterized       Squam Epithel, UA 2       Urobilinogen, UA Negative       WBC, UA 35(H)       WBC  6.90            Significant Imaging: I have reviewed all pertinent imaging results/findings within the past 24 hours.

## 2018-03-07 NOTE — CONSULTS
"  [facility]  Adult Nutrition  Consult Note    SUMMARY     Recommendations    1. Continue dysphagia mechanical soft regular diet.   2. Recommend Boost Plus TID to provide additional calories and promote wound healing.   3. RD to monitor and follow-up.    Goals: PO intake >85% EEN,EPN  Nutrition Goal Status: new  Communication of RD Recs: reviewed with RN    Reason for Assessment    Reason for Assessment: physician consult  Diagnosis: other (see comments) (Encephalopathy, metabolic)  Relevent Medical History: Stroke, HTN, HLD     General Information Comments: Pt non-verbal during visit. Pt has history of poor appetite and dysphagia. Pt rec for mechanical soft diet per SLP     Nutrition Discharge Planning: PO intake >85% EEN,EPN    Nutrition Prescription Ordered    Current Diet Order: Dysphagia Mechanical Soft   Nutrition Order Comments: Thin    Evaluation of Received Nutrients/Fluid Intake    Energy Calories Required: not meeting needs   Protein Required: not meeting needs    IV Fluid (mL): 1800  Fluid Required: other (see comments) (Per Md)  Comments: LBM 3/7    % Intake of Estimated Energy Needs: Other: CHRISTY  % Meal Intake: Other: CHRISTY     Nutrition Risk Screen     Nutrition Risk Screen: dysphagia or difficulty swallowing    Nutrition/Diet History    Patient Reported Diet/Restrictions/Preferences: other (see comments) (christy)  Food Preferences: christy  Factors Affecting Nutritional Intake: decreased appetite, impaired cognitive status/motor control, difficulty/impaired swallowing    Labs/Tests/Procedures/Meds    Pertinent Labs Reviewed: reviewed  Pertinent Labs Comments: Na 146, P 1.9, Alb 2.3  Pertinent Medications Reviewed: reviewed  Pertinent Medications Comments: Statin, Mg, K, IVF    Physical Findings    Overall Physical Appearance: underweight  Oral/Mouth Cavity: WDL  Skin: pressure ulcer(s), dermatitis (2x PU gluteal region)    Anthropometrics    Temp: 98.6 °F (37 °C)  Height: 5' 1" (154.9 " cm)  Weight: 47.2 kg (104 lb 0.9 oz)     Ideal Body Weight (IBW), Female: 105 lb  % Ideal Body Weight, Female (lb): 99.1 lb     BMI (Calculated): 19.7  BMI Grade: 18.5-24.9 - normal    Estimated/Assessed Needs    Weight Used For Calorie Calculations: 47.2 kg (104 lb 0.9 oz)   Energy Calorie Requirements (kcal): 1267  Energy Need Method: Kcal/kg (1.3 PAL)  RMR ([place]-St. Jeor Equation): 974.38     Weight Used For Protein Calculations: 47.2 kg (104 lb 0.9 oz)  Protein Requirements: 57-66g/d (1.2-1.4 g/kg)    Fluid Need Method: RDA Method (1 ml/kcal or per MD)  RDA Method (mL): 1267     I/O: - I/O    Assessment and Plan    * Encephalopathy, metabolic    Nutrition Problem:  Inadequate oral intake    Related to (etiology):   Swallowing/chewing difficulty, increased physiological needs    Signs and Symptoms (as evidenced by):   Dysphagia, 2x PU of gluteal region     Interventions/Recommendations (treatment strategy):  See recs above    Nutrition Diagnosis Status:   New            Monitor and Evaluation    Food and Nutrient Intake: energy intake, food and beverage intake  Food and Nutrient Adminstration: diet order  Knowledge/Beliefs/Attitudes: beliefs and attitudes, food and nutrition knowledge/skill  Physical Activity and Function: nutrition-related ADLs and IADLs  Anthropometric Measurements: height/length, weight, weight change, body mass index  Biochemical Data, Medical Tests and Procedures: electrolyte and renal panel, gastrointestinal profile, glucose/endocrine profile, inflammatory profile, lipid profile  Nutrition-Focused Physical Findings: overall appearance    Nutrition Risk    Level of Risk: other (see comments) (2x/week)    Nutrition Follow-Up    RD Follow-up?: Yes     Mustapha Valverde  Dietetic Intern    I certify that I directed the dietetic intern in service delivery and guided them using my skilled judgment. As the cosigning dietitian, I have reviewed the dietetic interns documentation and am responsible for  the treatment, assessment, and plan.

## 2018-03-07 NOTE — PLAN OF CARE
Problem: Physical Therapy Goal  Goal: Physical Therapy Goal  Goals to be met by: 3/14/18    Patient will increase functional independence with mobility by performin. Supine to sit with Minimal Assistance  2. Sit to supine with Minimal Assistance  3. Sit to stand transfer with Minimal Assistance with least restrictive AD.   4. Bed to chair transfer with Moderate Assistance using least restrictive AD.   5. Gait  x 10 feet with Moderate Assistance using least restrictive AD.   6. Stand for 3 minutes with Minimal Assistance  7. Lower extremity exercise program x20 reps per handout, with assistance as needed    Outcome: Ongoing (interventions implemented as appropriate)  Pt chart reviewed and PT evaluation completed- see note for details. POC initiated.     Alka Rivas, PT, DPT   3/7/2018  Pager: [phone]

## 2018-03-07 NOTE — PLAN OF CARE
Problem: Occupational Therapy Goal  Goal: Occupational Therapy Goal  Goals to be met by: 7 days     Patient will increase functional independence with ADLs by performing:    Feeding with Set-up Assistance.  UE Dressing with Set-up Assistance.  LE Dressing with Moderate Assistance.  Grooming while seated EOB with Stand-by/Set-up assistance.  Toileting from bedside commode with Moderate Assistance for hygiene and clothing management.   Supine to sit with Minimal Assistance.  Stand pivot transfer EOB->bedside chair with Moderate Assistance using RW.  Toilet transfer EOB->bedside commode with Moderate Assistance using RW.    Outcome: Ongoing (interventions implemented as appropriate)  OT goals set.  EZEQUIEL Manuel  3/7/2018

## 2018-03-07 NOTE — NURSING
Pt arrived via stretcher with EMT x 2. VSS. Iniguez intact. Pt denies pain. Hospital medicine paged for admission and orders.

## 2018-03-07 NOTE — PLAN OF CARE
Please call extension [phone] (if nobody answers, this will flip to a beeper, so put in your call back number) upon patient arrival to floor for Hospital Medicine admit team assignment and for additional admit orders for the patient.  Do not page the attending, staff physician associate with the patient on arrival (may not be in-house at the time of arrival).  Rather, always call [phone] to reach the triage physician for orders and team assignment.        Outside Transfer Acceptance Note     Patients name: Jurgen Lisbeth     Transferring Physician or Mid-Level provider/Clinic giving report: Dr. Madhav Goff, [facility]       Accepting Physician for admission to hospital: Saulo Becker MD        Date of acceptance:  3/6/18     Reason for transfer:  UTI tx prior to  shunt placement by Neurosurgery     Report from Physician/Mid-Level Provider:    HPI:  63F recently diagnosed with NPH admitted here 2/25-3/3 for encephalopathy, ADITHYA and hypernatremia left with plan to follow up with neurosurgeon in [place] for  shunt with PT/OT recommendations for SNF which family couldn't afford went home instead with  but since discharge patient has not eaten and has gotten weaker and was brought to [facility] ED.  She has an indwelling Iniguez with mildly positive UA.  ED physician spoke with neurosurgeon Dr Catherine willett who was agreeable to doing  shunt later this week after UTI has been cleared.  She got a dose of ceftriaxone in the ED.    VS: all WNL    Labs:  UA with positive nitrite, 1+ leuk esterase, 35 WBC on micro.    Radiographs:     To Do List upon arrival:  continue abx treatment, Neurosurgery consult     Please call extension [phone] (if nobody answers, this will flip to a beeper, so put in your call back number) upon patient arrival to floor for Hospital Medicine admit team assignment and for additional admit orders for the patient.  Do not page the attending, staff physician associate with the patient on arrival (may not be  in-house at the time of arrival).  Rather, always call [phone] to reach the triage physician for orders and team assignment.

## 2018-03-07 NOTE — NURSING
Attempted to place SCDs on pt. Pt shook head no and kicked legs. RN will continue to educate and re-attempt at another time..

## 2018-03-07 NOTE — PT/OT/SLP EVAL
"Physical Therapy Evaluation    Patient Name:  Lisbeth Seaman   MRN:  [id number]    Recommendations:     Discharge Recommendations:  nursing facility, skilled   Discharge Equipment Recommendations:  (TBD)   Barriers to discharge:pending further information on pt's living environment and caregiver assistance    Assessment:     Lisbeth Seaman is a 63 y.o. female admitted with a medical diagnosis of Encephalopathy, metabolic.  She presents with the following impairments/functional limitations:  weakness, impaired endurance, impaired functional mobilty, impaired self care skills, gait instability, impaired balance, impaired cognition, decreased lower extremity function, decreased safety awareness, impaired cardiopulmonary response to activity, decreased ROM. Pt presents with significant deconditioning, impaired standing balance, poor posture, and difficulty ambulating. Pt alert, able to follow simple commands and cooperative throughout evaluation. Able to communicate verbally with decreased volume of speech, however is an unreliable historian 2/2 cognitive deficits. Pt would benefit from skilled PT intervention to address below listed deficits, reduce fall risk, and maximize (I) and safety with functional mobility.     Rehab Prognosis:  Good; patient would benefit from acute skilled PT services to address these deficits and reach maximum level of function.      Recent Surgery: * No surgery found *      Plan:     During this hospitalization, patient to be seen 4 x/week to address the above listed problems via gait training, therapeutic activities, therapeutic exercises, neuromuscular re-education  · Plan of Care Expires:  04/06/18   Plan of Care Reviewed with: patient    Subjective     Communicated with RN prior to session.  Patient found supine upon PT entry to room, agreeable to evaluation.      Chief Complaint: weakness   Patient comments/goals: Pt expressed that she would like to "sit up" when prompted "   Pain/Comfort:  · Pain Rating 1: 0/10  · Pain Rating Post-Intervention 1: 0/10    Patients cultural, spiritual, Voodoo conflicts given the current situation: no conflicts    Patient History:     *Pt oriented to person only; pt states that she lives alone, but is unable to provide reliable history; will need to obtain information on pt's living environment, PLOF, and caregiver assistance from family/caregiver.     Objective:     Patient found with: telemetry     General Precautions: Standard, fall, aspiration   Orthopedic Precautions:N/A   Braces: N/A     Exams:  · Cognitive Exam:  Patient is oriented to person; able to follow simple commands   · Postural Exam:  Patient presented with the following abnormalities:    · -       Rounded shoulders  · -       Forward head  · -       Kyphosis  · RLE ROM: WFL except ankle DF grossly decreased; pt with ankles resting in excessive PF and inversion   · RLE Strength: grossly decreased based on observation of functional mobility   · LLE ROM: WFL except ankle DF grossly decreased; pt with ankles resting in excessive PF and inversion   · LLE Strength: grossly decreased based on observation of functional mobility     Functional Mobility:       Bed Mobility  · Rolling: to left with minimum assistance   · Supine to sit: maximum assistance      Transfers · Sit <> Stand: maximum assistance from EOB x 2 trials with no AD, bilateral HHA provided. Pt with posterior lean in standing.   · Bed <> Chair: maximum assistance for squat pivot transfer with no AD        Gait    Pt unable to perform.        Balance  - Static Sitting: SBA while seated EOB ~10 minutes   - Dynamic Sitting: CGA-min A   - Static Standing: poor; requires max A   - Dynamic Standing: poor; requires max A     AM-PAC 6 CLICK MOBILITY  Total Score:10     Therapeutic Activities and Exercises:  Pt educated on role of PT and POC/goals for therapy as well as safety with mobility. Pt verbalized understanding, able to  demonstrate understanding of use of call button for RN assistance-- however will need to reinforce education 2/2 impaired cognition. Pt expressed no further concerns/questions.     Patient left up in chair with all lines intact, call button in reach, chair alarm on and RN notified.    GOALS:    Physical Therapy Goals        Problem: Physical Therapy Goal    Goal Priority Disciplines Outcome Goal Variances Interventions   Physical Therapy Goal     PT/OT, PT Ongoing (interventions implemented as appropriate)     Description:  Goals to be met by: 3/14/18    Patient will increase functional independence with mobility by performin. Supine to sit with Minimal Assistance  2. Sit to supine with Minimal Assistance  3. Sit to stand transfer with Minimal Assistance with least restrictive AD.   4. Bed to chair transfer with Moderate Assistance using least restrictive AD.   5. Gait  x 10 feet with Moderate Assistance using least restrictive AD.   6. Stand for 3 minutes with Minimal Assistance  7. Lower extremity exercise program x20 reps per handout, with assistance as needed                      History:     Past Medical History:   Diagnosis Date    Hyperlipidemia     Hypertension     Stroke 2017    residual deficits are ambulates on her own with a limp, but has limited use of her right arm; emotional lability    Vascular dementia     since 2017 stroke       History reviewed. No pertinent surgical history.    Clinical Decision Making:     Moderate complexity evaluation:   · Evolving clinical presentation   · 1-2 personal factors/comorbidities affecting treatment   · Examining and addressing 3+ functional deficits, activity limitations, and participation restrictions    Time Tracking:     PT Received On: 18  PT Start Time: 907     PT Stop Time: 931  PT Total Time (min): 24 min     Billable Minutes: Evaluation 24 min (Co-eval with OT)    Alka Rivas PT, DPT   3/7/2018  Pager: [phone]

## 2018-03-07 NOTE — HPI
Lisbeth Seaman is a 63 y.o. female who has pmhx HLD, HTN, vascular dementia and stroke is presenting to ED for evaluation failure to thrive since discharge from hospital three days ago. Daughter reports that pt has been unable to eat or drink, decrease mobility secondary to weakness, and confusion. She also reports decreased bowel movements. Family thinks that it was inappropriate to allow pt to be discharged. Family thinks that pt needs to be hospitalized due to declining health since discharge. Family contacted PCP today, who advised pt to go to ED for further evaluation.

## 2018-03-07 NOTE — PLAN OF CARE
Problem: Patient Care Overview  Goal: Plan of Care Review  Recommendations     1. Continue dysphagia mechanical soft regular diet.   2. Recommend Boost Plus TID to provide additional calories and promote wound healing.   3. RD to monitor and follow-up.      Assessment and Plan         * Encephalopathy, metabolic     Nutrition Problem:  Inadequate oral intake     Related to (etiology):   Swallowing/chewing difficulty, increased physiological needs     Signs and Symptoms (as evidenced by):   Dysphagia, 2x PU of gluteal region      Interventions/Recommendations (treatment strategy):  See recs above     Nutrition Diagnosis Status:   New

## 2018-03-07 NOTE — ASSESSMENT & PLAN NOTE
-continue Statin at this time  -Neurosurg consult placed   -neuro checks q4      Recent head Ct:  There is no acute abnormality.  There is no change compared to head CT dated 03/01/2018.  There is moderate generalized volume loss with marked nonspecific diffuse and confluent white matter change which likely reflect sequelae of severe microvascular ischemic disease.  There is a chronic infarction in the lateral right cerebellum.  There also chronic infarctions in the basal ganglia and thalami.  There is no hemorrhage, mass or obvious acute infarction.  It should be noted that MRI is more sensitive in the detection of subtle or acute nonhemorrhagic ischemic disease.

## 2018-03-07 NOTE — PLAN OF CARE
Problem: Patient Care Overview  Goal: Plan of Care Review  Outcome: Ongoing (interventions implemented as appropriate)  POC reviewed with pt, who is largely nonverbal. UTI tx and  shunt consult discussed as well as fall precautions and orders. Per patient she performs her own ADLS at home, but is unable/unwilling to feed self,reposition, or speak much for this RN.    SHAILESH. VSS. Pt unable to cooperate with enough of swallow screen to definitively order diet, ST to evaluate further.     Rocephin IV administered. UA processing. Turns assisted and gipson in place. Bed alarm on and pt closely monitored.

## 2018-03-07 NOTE — ASSESSMENT & PLAN NOTE
-patient reported to have NPH from outside hospital  -neurosurgery consult placed   -patient appears encephalopathic at time of presentation   -holding anticoagulation at this time

## 2018-03-07 NOTE — NURSING
Bedside swallow screen attempted with patient. Pt swallows puree inconsistently, occasionally spits out or recoils but swallows appropriately. Thin liquids tolerated. Pts voice is weak;voice change difficult to ascertain afterward.

## 2018-03-08 PROBLEM — N30.01 ACUTE CYSTITIS WITH HEMATURIA: Status: RESOLVED | Noted: 2018-03-07 | Resolved: 2018-03-08

## 2018-03-08 LAB
ALBUMIN SERPL BCP-MCNC: 2.4 G/DL
ALP SERPL-CCNC: 69 U/L
ALT SERPL W/O P-5'-P-CCNC: 14 U/L
ANION GAP SERPL CALC-SCNC: 10 MMOL/L
ANION GAP SERPL CALC-SCNC: 11 MMOL/L
AST SERPL-CCNC: 24 U/L
BACTERIA UR CULT: NORMAL
BACTERIA UR CULT: NORMAL
BASOPHILS # BLD AUTO: 0.02 K/UL
BASOPHILS NFR BLD: 0.3 %
BILIRUB SERPL-MCNC: 0.3 MG/DL
BUN SERPL-MCNC: 11 MG/DL
BUN SERPL-MCNC: 9 MG/DL
CALCIUM SERPL-MCNC: 8.4 MG/DL
CALCIUM SERPL-MCNC: 8.6 MG/DL
CHLORIDE SERPL-SCNC: 103 MMOL/L
CHLORIDE SERPL-SCNC: 106 MMOL/L
CO2 SERPL-SCNC: 25 MMOL/L
CO2 SERPL-SCNC: 26 MMOL/L
CREAT SERPL-MCNC: 0.6 MG/DL
CREAT SERPL-MCNC: 0.6 MG/DL
DIFFERENTIAL METHOD: ABNORMAL
EOSINOPHIL # BLD AUTO: 0.1 K/UL
EOSINOPHIL NFR BLD: 1.1 %
ERYTHROCYTE [DISTWIDTH] IN BLOOD BY AUTOMATED COUNT: 14.4 %
EST. GFR  (AFRICAN AMERICAN): >60 ML/MIN/1.73 M^2
EST. GFR  (AFRICAN AMERICAN): >60 ML/MIN/1.73 M^2
EST. GFR  (NON AFRICAN AMERICAN): >60 ML/MIN/1.73 M^2
EST. GFR  (NON AFRICAN AMERICAN): >60 ML/MIN/1.73 M^2
GLUCOSE SERPL-MCNC: 102 MG/DL
GLUCOSE SERPL-MCNC: 110 MG/DL
HCT VFR BLD AUTO: 31.2 %
HGB BLD-MCNC: 9.9 G/DL
IMM GRANULOCYTES # BLD AUTO: 0.25 K/UL
IMM GRANULOCYTES NFR BLD AUTO: 3.9 %
LYMPHOCYTES # BLD AUTO: 1 K/UL
LYMPHOCYTES NFR BLD: 15.9 %
MAGNESIUM SERPL-MCNC: 1.5 MG/DL
MCH RBC QN AUTO: 29.7 PG
MCHC RBC AUTO-ENTMCNC: 31.7 G/DL
MCV RBC AUTO: 94 FL
MONOCYTES # BLD AUTO: 0.6 K/UL
MONOCYTES NFR BLD: 9.8 %
NEUTROPHILS # BLD AUTO: 4.4 K/UL
NEUTROPHILS NFR BLD: 69 %
NRBC BLD-RTO: 0 /100 WBC
OB PNL STL: POSITIVE
PHOSPHATE SERPL-MCNC: 2 MG/DL
PLATELET # BLD AUTO: 130 K/UL
PMV BLD AUTO: 9.7 FL
POTASSIUM SERPL-SCNC: 3.7 MMOL/L
POTASSIUM SERPL-SCNC: 4 MMOL/L
PROT SERPL-MCNC: 6.4 G/DL
RBC # BLD AUTO: 3.33 M/UL
SODIUM SERPL-SCNC: 139 MMOL/L
SODIUM SERPL-SCNC: 142 MMOL/L
WBC # BLD AUTO: 6.43 K/UL

## 2018-03-08 PROCEDURE — 82272 OCCULT BLD FECES 1-3 TESTS: CPT

## 2018-03-08 PROCEDURE — 25000003 PHARM REV CODE 250: Performed by: STUDENT IN AN ORGANIZED HEALTH CARE EDUCATION/TRAINING PROGRAM

## 2018-03-08 PROCEDURE — 36415 COLL VENOUS BLD VENIPUNCTURE: CPT

## 2018-03-08 PROCEDURE — 20600001 HC STEP DOWN PRIVATE ROOM

## 2018-03-08 PROCEDURE — 84100 ASSAY OF PHOSPHORUS: CPT

## 2018-03-08 PROCEDURE — 99233 SBSQ HOSP IP/OBS HIGH 50: CPT | Mod: ,,, | Performed by: HOSPITALIST

## 2018-03-08 PROCEDURE — 63600175 PHARM REV CODE 636 W HCPCS: Performed by: INTERNAL MEDICINE

## 2018-03-08 PROCEDURE — 80053 COMPREHEN METABOLIC PANEL: CPT

## 2018-03-08 PROCEDURE — 97530 THERAPEUTIC ACTIVITIES: CPT

## 2018-03-08 PROCEDURE — 80048 BASIC METABOLIC PNL TOTAL CA: CPT

## 2018-03-08 PROCEDURE — 97535 SELF CARE MNGMENT TRAINING: CPT

## 2018-03-08 PROCEDURE — 97112 NEUROMUSCULAR REEDUCATION: CPT

## 2018-03-08 PROCEDURE — 85025 COMPLETE CBC W/AUTO DIFF WBC: CPT

## 2018-03-08 PROCEDURE — 83735 ASSAY OF MAGNESIUM: CPT

## 2018-03-08 RX ORDER — SODIUM CHLORIDE, SODIUM LACTATE, POTASSIUM CHLORIDE, CALCIUM CHLORIDE 600; 310; 30; 20 MG/100ML; MG/100ML; MG/100ML; MG/100ML
INJECTION, SOLUTION INTRAVENOUS CONTINUOUS
Status: DISCONTINUED | OUTPATIENT
Start: 2018-03-08 | End: 2018-03-16

## 2018-03-08 RX ADMIN — Medication 400 MG: at 09:03

## 2018-03-08 RX ADMIN — DIBASIC SODIUM PHOSPHATE, MONOBASIC POTASSIUM PHOSPHATE AND MONOBASIC SODIUM PHOSPHATE 1 TABLET: 852; 155; 130 TABLET ORAL at 10:03

## 2018-03-08 RX ADMIN — CEFTRIAXONE SODIUM 1 G: 1 INJECTION, POWDER, FOR SOLUTION INTRAMUSCULAR; INTRAVENOUS at 01:03

## 2018-03-08 RX ADMIN — ATORVASTATIN CALCIUM 40 MG: 20 TABLET, FILM COATED ORAL at 09:03

## 2018-03-08 NOTE — PLAN OF CARE
Problem: Fall Risk (Adult)  Goal: Identify Related Risk Factors and Signs and Symptoms  Related risk factors and signs and symptoms are identified upon initiation of Human Response Clinical Practice Guideline (CPG)   Outcome: Ongoing (interventions implemented as appropriate)  Patient will remain free from falls, and remain comfortable with using the call button when help is needed. Fall precautions will be taken each time going into patients room.

## 2018-03-08 NOTE — ASSESSMENT & PLAN NOTE
- Ceftriaxone 1g Q24 X 3 days for treatment of presumed UTI; currently D2/3  - UA positive for nitrites, many bacteria and 35 WBC  - Cx--> Multiple organisms present; none in predominance. Will complete the 3 day course of Abx to ensure that there is no infection as there is a possibility of NSGY intervention during this admission  - WBC 6.43

## 2018-03-08 NOTE — PT/OT/SLP PROGRESS
Physical Therapy Treatment    Patient Name:  Lisbeth Seaman   MRN:  [id number]    Recommendations:     Discharge Recommendations:  nursing facility, skilled   Discharge Equipment Recommendations: bedside commode   Barriers to discharge: Decreased caregiver support    Assessment:     Lisbeth Seaman is a 63 y.o. female admitted with a medical diagnosis of Encephalopathy, metabolic.  She presents with the following impairments/functional limitations:  weakness, impaired endurance, gait instability, impaired self care skills, impaired functional mobilty, impaired balance, impaired cognition, decreased lower extremity function, decreased upper extremity function, decreased safety awareness, abnormal tone, impaired fine motor, impaired cardiopulmonary response to activity. Pt alert and cooperative throughout session, able to follow simple commands. Pt continues to require max A for bed mobility and total A to stand for brief intervals. Pt with increased tone of BLEs, impaired cognition, and significant deconditioning, impacting functional mobility and participation in ADLs. Pt would benefit from continued PT intervention to address below listed deficits and maximize return to PLOF.       Rehab Prognosis: good; patient would benefit from acute skilled PT services to address these deficits and reach maximum level of function.      Recent Surgery: * No surgery found *      Plan:     During this hospitalization, patient to be seen 4 x/week to address the above listed problems via gait training, therapeutic exercises, therapeutic activities, neuromuscular re-education, wheelchair management/training  · Plan of Care Expires:  04/05/18   Plan of Care Reviewed with: patient, son    Subjective     Communicated with RN prior to session.  Patient found supine with HOB elevated upon PT entry to room, agreeable to treatment.  Pt's son at bedside. Pt's son able to provide information regarding pt's PLOF and living environment. Pt  lives in [facility] with ramp access to enter; prior to admit, pt was able to ambulate with HHA- over the past month, pt has required increased assistance with bathing, dressing, and ambulating but was previously mod(I).  Pt's son states that pt was unable to discharge to rehab following last hospital admit (last week) due to financial reasons- unable to afford out-of-pocket cost for rehab per pt's son. Pt will have assistance from son and son's girlfriend (son works at night) upon discharge.     Chief Complaint: weakness   Patient comments/goals: pt unable to state goals; pt alert, oriented to person only; able to state son's name   Pain/Comfort:  · Pain Rating 1: 0/10  · Pain Rating Post-Intervention 1: 0/10    Patients cultural, spiritual, Judaism conflicts given the current situation: no conflicts    Objective:     Patient found with: telemetry, bed alarm     General Precautions: Standard, fall, aspiration   Orthopedic Precautions:N/A   Braces: N/A     Functional Mobility:       Bed Mobility  · Rolling: to right with maximum assistance   · Supine to sit: maximum assistance     · Sit to supine: moderate assistance      Transfers · Sit <> Stand: total assistance x 2; 2 trials performed from EOB with no AD, HHA provided. Pt with posterior lean, poor postural control, increased B ankle PF and inversion      Gait  Pt unable to perform        Balance  - Static Sitting: SBA   - Dynamic Sitting: CGA-mod A 2/2 posterior lean   - Static Standing: total assistance; posterior lean     AM-PAC 6 CLICK MOBILITY  Turning over in bed (including adjusting bedclothes, sheets and blankets)?: 2  Sitting down on and standing up from a chair with arms (e.g., wheelchair, bedside commode, etc.): 2  Moving from lying on back to sitting on the side of the bed?: 2  Moving to and from a bed to a chair (including a wheelchair)?: 2  Need to walk in hospital room?: 1  Climbing 3-5 steps with a railing?: 1  Total Score: 10       Therapeutic  Activities and Exercises:  Therapeutic activities aimed to:  -  increase pt's independence, safety, and efficiency with bed mobility and functional transfers. See above for assistance levels.   - increase functional strength and endurance through performance of above listed activities  - improve BLE increased tissue tension; therapist facilitated gentle stretch of B hamstrings and B dorsiflexors x 30 sec hold, increased tone noted in BLEs (R>L)     Neuromuscular re-education facilitated to improve static and dynamic sitting balance, trunk control, postural awareness:   - pt sat EOB ~ 20 minutes; able to maintain static balance with SBA, but requiring CGA-mod A during dynamic activities. Therapist provided verbal and tactile cueing to improve midline orientation, and increase weight bearing through BLEs in sitting.   - balloon toss activity performed x 4 minutes to improve trunk control, gross motor coordination and balance; pt performed while sitting EOB- pt's son provided posterior support to maintain balance, pt able to toss and catch balloon with moderate assistance     Patient left HOB elevated with all lines intact, call button in reach, bed alarm on, RN notified and son present.     GOALS:    Physical Therapy Goals        Problem: Physical Therapy Goal    Goal Priority Disciplines Outcome Goal Variances Interventions   Physical Therapy Goal     PT/OT, PT Ongoing (interventions implemented as appropriate)     Description:  Goals to be met by: 3/14/18    Patient will increase functional independence with mobility by performin. Supine to sit with Minimal Assistance  2. Sit to supine with Minimal Assistance  3. Sit to stand transfer with Minimal Assistance with least restrictive AD.   4. Bed to chair transfer with Moderate Assistance using least restrictive AD.   5. Gait  x 10 feet with Moderate Assistance using least restrictive AD.   6. Stand for 3 minutes with Minimal Assistance  7. Lower extremity  exercise program x20 reps per handout, with assistance as needed                      Time Tracking:     PT Received On: 03/08/18  PT Start Time: 1316     PT Stop Time: 1347  PT Total Time (min): 31 min     Billable Minutes: Therapeutic Activity 15 min and Neuromuscular Re-education 15 min    Treatment Type: Treatment  PT/PTA: PT     PTA Visit Number: 0     Alka Rivas PT, DPT   3/8/2018  Pager: [phone]

## 2018-03-08 NOTE — SUBJECTIVE & OBJECTIVE
Medications:  Continuous Infusions:   lactated ringers Stopped (03/08/18 1130)     Scheduled Meds:   atorvastatin  40 mg Oral Daily    busPIRone  15 mg Oral Daily    cefTRIAXone (ROCEPHIN) IVPB  1 g Intravenous Q24H    k phos di & mono-sod phos mono  1 tablet Oral TID    magnesium oxide  400 mg Oral BID     PRN Meds:acetaminophen, dextrose 50%, dextrose 50%, glucagon (human recombinant), glucose, glucose, ramelteon, sodium chloride 0.9%     Review of Systems   Constitutional: no fever or chills  Eyes: no visual changes  ENT: no nasal congestion or sore throat  Respiratory: no cough or shortness of breath  Cardiovascular: no chest pain or palpitations  Gastrointestinal: no nausea or vomiting  Genitourinary: no hematuria or dysuria  Integument/Breast: no rash or pruritis  Hematologic/Lymphatic: no easy bruising or lymphadenopathy  Musculoskeletal: no arthralgias or myalgias  Neurological: no seizures or tremors    Objective:     Weight: 47.2 kg (104 lb 0.9 oz)  Body mass index is 19.66 kg/m².  Vital Signs (Most Recent):  Temp: 97.4 °F (36.3 °C) (03/08/18 1202)  Pulse: 79 (03/08/18 1202)  Resp: 18 (03/08/18 1202)  BP: 121/78 (03/08/18 1202)  SpO2: (!) 93 % (03/08/18 1202) Vital Signs (24h Range):  Temp:  [97.4 °F (36.3 °C)-98.6 °F (37 °C)] 97.4 °F (36.3 °C)  Pulse:  [63-79] 79  Resp:  [14-18] 18  SpO2:  [79 %-100 %] 93 %  BP: (118-136)/(72-99) 121/78                      Neurosurgery Physical Exam   General: no distress  Neurologic: Awake, Alert. Unable to identify location or time.   Head: normocephalic  GCS: Motor: 6/Verbal: 5/Eyes: 4 GCS Total: 15  Cranial nerves: face symmetric, tongue midline, pupils equal, round, reactive to light with accomodation, extraocular muscles intact  Sensory: response to light touch throughout  Motor Strength: generalized weakness   No focal numbness or weakness  Lungs:  normal respiratory effort  Abdomen: soft, non-tender   Extremities: no cyanosis or edema, or  clubbing      Significant Labs:    Recent Labs  Lab 03/07/18  1613 03/08/18  0012 03/08/18  0534   GLU 90 110 102    142 139   K 4.2 4.0 3.7    106 103   CO2 20* 26 25   BUN 15 11 9   CREATININE 0.6 0.6 0.6   CALCIUM 8.5* 8.4* 8.6*   MG  --   --  1.5*       Recent Labs  Lab 03/08/18  0836   WBC 6.43   HGB 9.9*   HCT 31.2*   *       Recent Labs  Lab 03/06/18  2355   INR 1.0   APTT 21.1     Microbiology Results (last 7 days)     Procedure Component Value Units Date/Time    Urine culture [[id number]]     Order Status:  No result Specimen:  Urine             Significant Diagnostics:  1.  There is no acute abnormality.  There is no change compared to head CT dated 03/01/2018.  There is moderate generalized volume loss with marked nonspecific diffuse and confluent white matter change which likely reflect sequelae of severe microvascular ischemic disease.  There is a chronic infarction in the lateral right cerebellum.  There also chronic infarctions in the basal ganglia and thalami.  There is no hemorrhage, mass or obvious acute infarction.  It should be noted that MRI is more sensitive in the detection of subtle or acute nonhemorrhagic ischemic disease.      Electronically signed by: Laz Smith MD  Date: 03/06/2018  Time: 14:40

## 2018-03-08 NOTE — HOSPITAL COURSE
03/08 NSGY to evaluate today; Day 2/3 of Abx for presumed UTI, cultures pending. SLP re-evaluation for some difficulties during PO intake   03/09 Doing well. Mental status stable. Completed ceftriaxone this morning with test for clearance pending.   03/10 Awaiting UA/Ucx to confirm bacterial clearance of UTI. No issues overnight; continuing to work on adequate PO intake.   03/11 Repeat UA w/o any signs of infection. Patient stable.  shunt scheduled for Monday. NPO after midnight.  03/12 Patient received BP shunt today. Will D/c Bactrim.   03/13 Patient doing well with no complaints. Her V/P shunt was kinked this morning and settings were adjusted by neurosurgery. Will monitor and obtain abdominal x-ray tomorrow. Patient encouraged PO diet.   03/14-15 Patient's X-ray abdomen showed kinked V/P which has resolved. She is medically stable for discharge but would like to go to SNF.   03/16 Started patient on Mirtazapine for appetite stimulation. Changed LR to LR with dextose. Awaiting SNF placement.   3/17 SHAILESH. Her appetite continues to be poor.   3/17-3/20: Discussed with family and patient what her wishes are and she expressed that she would like to go home. Her family agreed that home hospice would be the best option for her at this time. Palliative care has been working with the family and discussed their wishes. Patient continues to have poor PO intake despite starting appetite stimulants. She otherwise has no complaints. Awaiting discharge.

## 2018-03-08 NOTE — HPI
Patient is 62 yo F h/o vascular dementia, stroke 9/2017, HTN, HLD, and NPH. She presents for evaluation of failure to thrive since discharge from hospital three days ago.  Per family functional status has been steadily declining since stroke in 9/2017 including not being able to ambulate on her own or dress, feed, or bath herself. Pt was hospitalized stay from 2/25/2018-3/3/2018,  Pt was evaluated by neurosurgery for NPH,  she had a high volume LP with   improvement of gait and cognitive symptoms, and was scheduled for  shunt placement back home in [place].  Since being home, she has been working with home health, her son reports one day after discharge stay she began to show increase in generalized weakness and confusion.  Pt was found to have UTI and currently admitted under medicine service.  Neurosurgery consulted for evaluation of NPH/possible  shunt placement.  Patient is unable to provide reliable history. Son at bedside provided history.

## 2018-03-08 NOTE — PLAN OF CARE
Problem: Physical Therapy Goal  Goal: Physical Therapy Goal  Goals to be met by: 3/14/18    Patient will increase functional independence with mobility by performin. Supine to sit with Minimal Assistance  2. Sit to supine with Minimal Assistance  3. Sit to stand transfer with Minimal Assistance with least restrictive AD.   4. Bed to chair transfer with Moderate Assistance using least restrictive AD.   5. Gait  x 10 feet with Moderate Assistance using least restrictive AD.   6. Stand for 3 minutes with Minimal Assistance  7. Lower extremity exercise program x20 reps per handout, with assistance as needed     Outcome: Ongoing (interventions implemented as appropriate)  No goals met this visit; continue current POC.     Alka Rivas PT, DPT   3/8/2018  Pager: [phone]

## 2018-03-08 NOTE — CONSULTS
[facility]  Neurosurgery  Consult Note    Consults  Subjective:     Chief Complaint: NPH    History of Present Illness: Patient is 64 yo F h/o vascular dementia, stroke 9/2017, HTN, HLD, and NPH. She presents for evaluation of failure to thrive since discharge from hospital three days ago.  Per family functional status has been steadily declining since stroke in 9/2017 including not being able to ambulate on her own or dress, feed, or bath herself. Pt was hospitalized stay from 2/25/2018-3/3/2018,  Pt was evaluated by neurosurgery for NPH,  she had a high volume LP with   improvement of gait and cognitive symptoms, and was scheduled for  shunt placement back home in [place].  Since being home, she has been working with home health, her son reports one day after discharge stay she began to show increase in generalized weakness and confusion.  Pt was found to have UTI and currently admitted under medicine service.  Neurosurgery consulted for evaluation of NPH/possible  shunt placement.  Patient is unable to provide reliable history. Son at bedside provided history.             Medications:  Continuous Infusions:   lactated ringers Stopped (03/08/18 1130)     Scheduled Meds:   atorvastatin  40 mg Oral Daily    busPIRone  15 mg Oral Daily    cefTRIAXone (ROCEPHIN) IVPB  1 g Intravenous Q24H    k phos di & mono-sod phos mono  1 tablet Oral TID    magnesium oxide  400 mg Oral BID     PRN Meds:acetaminophen, dextrose 50%, dextrose 50%, glucagon (human recombinant), glucose, glucose, ramelteon, sodium chloride 0.9%     Review of Systems   Constitutional: no fever or chills  Eyes: no visual changes  ENT: no nasal congestion or sore throat  Respiratory: no cough or shortness of breath  Cardiovascular: no chest pain or palpitations  Gastrointestinal: no nausea or vomiting  Genitourinary: no hematuria or dysuria  Integument/Breast: no rash or pruritis  Hematologic/Lymphatic: no easy bruising or  lymphadenopathy  Musculoskeletal: no arthralgias or myalgias  Neurological: no seizures or tremors    Objective:     Weight: 47.2 kg (104 lb 0.9 oz)  Body mass index is 19.66 kg/m².  Vital Signs (Most Recent):  Temp: 97.4 °F (36.3 °C) (03/08/18 1202)  Pulse: 79 (03/08/18 1202)  Resp: 18 (03/08/18 1202)  BP: 121/78 (03/08/18 1202)  SpO2: (!) 93 % (03/08/18 1202) Vital Signs (24h Range):  Temp:  [97.4 °F (36.3 °C)-98.6 °F (37 °C)] 97.4 °F (36.3 °C)  Pulse:  [63-79] 79  Resp:  [14-18] 18  SpO2:  [79 %-100 %] 93 %  BP: (118-136)/(72-99) 121/78                      Neurosurgery Physical Exam   General: no distress  Neurologic: Awake, Alert. Unable to identify location or time.   Head: normocephalic  GCS: Motor: 6/Verbal: 5/Eyes: 4 GCS Total: 15  Cranial nerves: face symmetric, tongue midline, pupils equal, round, reactive to light with accomodation, extraocular muscles intact  Sensory: response to light touch throughout  Motor Strength: generalized weakness   No focal numbness or weakness  Lungs:  normal respiratory effort  Abdomen: soft, non-tender   Extremities: no cyanosis or edema, or clubbing      Significant Labs:    Recent Labs  Lab 03/07/18  1613 03/08/18  0012 03/08/18  0534   GLU 90 110 102    142 139   K 4.2 4.0 3.7    106 103   CO2 20* 26 25   BUN 15 11 9   CREATININE 0.6 0.6 0.6   CALCIUM 8.5* 8.4* 8.6*   MG  --   --  1.5*       Recent Labs  Lab 03/08/18  0836   WBC 6.43   HGB 9.9*   HCT 31.2*   *       Recent Labs  Lab 03/06/18  2355   INR 1.0   APTT 21.1     Microbiology Results (last 7 days)     Procedure Component Value Units Date/Time    Urine culture [[id number]]     Order Status:  No result Specimen:  Urine             Significant Diagnostics:  1.  There is no acute abnormality.  There is no change compared to head CT dated 03/01/2018.  There is moderate generalized volume loss with marked nonspecific diffuse and confluent white matter change which likely reflect sequelae of severe  microvascular ischemic disease.  There is a chronic infarction in the lateral right cerebellum.  There also chronic infarctions in the basal ganglia and thalami.  There is no hemorrhage, mass or obvious acute infarction.  It should be noted that MRI is more sensitive in the detection of subtle or acute nonhemorrhagic ischemic disease.      Electronically signed by: Laz Smith MD  Date: 03/06/2018  Time: 14:40      Assessment/Plan:     Normal pressure hydrocephalus    64 yo F with increased confusion and failure to thrive with known NPH   - NPH is unlikely etiology of acute confusion, more likely associated with UTI.  Her cognition is improving since admission.   - Repeat heat CT head on 3/6 shows no acute abnormality, stable ventricular size compared to CT head on 3/1  - Pt had high volume LP 3/2/2018 with improvement of gait and some cognitive symptoms.  She would likely benefit from  shunt placement on non emergent basis when UTI resolved  - Recommend repeat UA/Urine culture when ABX course complete  - Continue current medical management per primary team, for UTI and + FOBT   - Pt with h/o thrombocytopenia - currently 130   - Would consider  Shunt placement Monday vs on outpatient basis pending progress   - Will continue to follow, please call with questions   - Discussed with MICKI Linares  Neurosurgery  [facility]

## 2018-03-08 NOTE — ASSESSMENT & PLAN NOTE
- Continue Statin at this time  - Neurosurg consult placed   - Neuro checks q4  - Patient's current medical state is likely 2/2 combination of cebro-vascular disease and NPH component    Recent head Ct:  There is no acute abnormality.  There is no change compared to head CT dated 03/01/2018.  There is moderate generalized volume loss with marked nonspecific diffuse and confluent white matter change which likely reflect sequelae of severe microvascular ischemic disease.  There is a chronic infarction in the lateral right cerebellum. There also chronic infarctions in the basal ganglia and thalami.  There is no hemorrhage, mass or obvious acute infarction.  It should be noted that MRI is more sensitive in the detection of subtle or acute nonhemorrhagic ischemic disease.

## 2018-03-08 NOTE — PROGRESS NOTES
Patient refused to take her medication. After some education and coaxing, she agreed to take her pills with her breakfast. Assisted patient with a few bites and mixed her pills in one bite of soft food. She was chewing on the bite for a very long time, and tried to swallow multiple times, but the food/pills would not go down. She began coughing and spit her pill/food mixture out. I called and spoke with Dr. Thompson and told him this situation. He said he would order another speech swallow evaluation and to make the patient NPO until the patient was examined by speech again.

## 2018-03-08 NOTE — ASSESSMENT & PLAN NOTE
- Mildly enlarged ventricles on previous scans  - Therapeutic/diagnostic tap w/ 7 cc off on previous admission did render improvement in symptoms; PT pre and post testing confirmed     - NSGY to give recommendations and scheduled for  shunt if appropriate   - Holding anticoagulation at this time

## 2018-03-08 NOTE — ASSESSMENT & PLAN NOTE
- Dx of NPH after a LP on previous admission w/ improvement based on pre- and post- LP PT evaluation  - NSGY to follow up;appreciate recommendations   - holding anticoagulation at this time

## 2018-03-08 NOTE — PT/OT/SLP PROGRESS
Occupational Therapy   Treatment    Name: Lisbeth Seaman  MRN: [id number]  Admitting Diagnosis:  Encephalopathy, metabolic       Recommendations:     Discharge Recommendations: nursing facility, skilled  Discharge Equipment Recommendations:   (TBD)  Barriers to discharge:   (level of assistance required; unknown home environment/caregiver support)    Subjective     Communicated with: RN prior to session.  Pain/Comfort:  · Pain Rating 1:  (pt did not verbalize pain)    Pt with no verbal communication this session.  Objective:     Patient found with: telemetry    General Precautions: Standard, fall, aspiration   Orthopedic Precautions:N/A   Braces: N/A     Occupational Performance:    Bed Mobility:    · Patient completed Rolling/Turning to Left with  maximal assistance  · Patient completed Rolling/Turning to Right with maximal assistance  · Patient completed Scooting/Bridging with total assistance  · Patient completed Supine to Sit with maximal assistance  · Patient completed Sit to Supine with maximal assistance   · Pt sat EOB ~15 minutes with mod-SBA for sitting balance; several LOB posteriorly and laterally    Functional Mobility/Transfers:  · Attempted Sit > Stand from EOB x 3 trials requiring max A of 2 persons; B feet blocked from sliding forward, B knees blocked from internally rotating; unable to achieve full upright stand  · Attempted Squat Pivot t/f from EOB > BSC/EOB > chair x 4 trials requiring total A; B feet blocked from sliding forward; unable to complete t/f due to posterior trunk lean and B leg extension upon attempts; facilitated LE WB between trials to decrease plantar flexion, IR of feet, and knee extension; pt unable to maintain safe position of LE to complete transfer    Activities of Daily Living:  · Grooming: attempted to engage pt in grooming tasks while in supported sitting with bed in chair position   · Handed pt toothbrush/toothpaste to facilitate bimanual setup of oral care task; pt with  toothbrush in R hand and toothpaste in L hand and put toothbrush in mouth without toothpaste; despite max cueing pt did not actively engage in setting up oral care task  · With setup assistance of toothpaste on toothbrush in R hand, pt brought toothbrush to mouth and bit some toothpaste off but did not engage in oral care task appropriately despite max cueing  · With setup assistance of wet washcloth in R hand, pt demonstrated limited participation in wiping face with max cueing and [place] assistance    Patient left with bed in chair position with all lines intact, call button in reach and bed alarm on    AMPAC 6 Click:  AMPAC Total Score: 14   Education:    Assessment:     Lisbeth Seaman is a 63 y.o. female with a medical diagnosis of Encephalopathy, metabolic.  She presents with impaired cognition required for active participation/initiation of ADL tasks. Pt did not engage in verbal communication and demonstrated limited appropriate engagement in ADL tasks this session. Pt demonstrated increased tone in BLE this session which limited safe performance of functional transfers with maximal assistance. Despite limitations, pt is agreeable to active participation in therapy. Performance deficits affecting function are weakness, impaired endurance, impaired self care skills, impaired functional mobilty, gait instability, impaired cognition, impaired balance, decreased upper extremity function, decreased lower extremity function, decreased safety awareness, abnormal tone, impaired joint extensibility.      Rehab Prognosis:  fair; patient would benefit from acute skilled OT services to address these deficits and reach maximum level of function.       Plan:     Patient to be seen 4 x/week to address the above listed problems via self-care/home management, therapeutic activities, therapeutic exercises, neuromuscular re-education, cognitive retraining  · Plan of Care Expires: 04/06/18  · Plan of Care Reviewed with:  patient    This Plan of care has been discussed with the patient who was involved in its development and understands and is in agreement with the identified goals and treatment plan    GOALS:    Occupational Therapy Goals        Problem: Occupational Therapy Goal    Goal Priority Disciplines Outcome Interventions   Occupational Therapy Goal     OT, PT/OT Ongoing (interventions implemented as appropriate)    Description:  Goals to be met by: 7 days     Patient will increase functional independence with ADLs by performing:    Feeding with Set-up Assistance.  UE Dressing with Set-up Assistance.  LE Dressing with Moderate Assistance.  Grooming while seated EOB with Stand-by/Set-up assistance.  Toileting from bedside commode with Moderate Assistance for hygiene and clothing management.   Supine to sit with Minimal Assistance.  Stand pivot transfer EOB->bedside chair with Moderate Assistance using RW.  Toilet transfer EOB->bedside commode with Moderate Assistance using RW.                      Time Tracking:     OT Date of Treatment: 03/08/18  OT Start Time: 0934  OT Stop Time: 1013  OT Total Time (min): 39 min    Billable Minutes:Self Care/Home Management 15  Therapeutic Activity 24    RODDY Rand  3/8/2018

## 2018-03-08 NOTE — SUBJECTIVE & OBJECTIVE
Interval History:  Patient doing well overnight; hypoxic readings during the night were erroneous; patient found saturating in high 90's on room air. Denies any pain at this time and is pleasant during rounds.  Awaiting NSGY recommendations at this time.     Review of Systems   Unable to perform ROS: Patient nonverbal   Constitutional: Negative for fever and unexpected weight change.   HENT: Positive for dental problem.    Respiratory: Negative for cough.    Cardiovascular: Negative for chest pain.   Gastrointestinal: Negative.    Neurological: Positive for weakness.     Objective:     Vital Signs (Most Recent):  Temp: 97.4 °F (36.3 °C) (03/08/18 1202)  Pulse: 79 (03/08/18 1202)  Resp: 18 (03/08/18 1202)  BP: 121/78 (03/08/18 1202)  SpO2: (!) 93 % (03/08/18 1202) Vital Signs (24h Range):  Temp:  [97.4 °F (36.3 °C)-98.6 °F (37 °C)] 97.4 °F (36.3 °C)  Pulse:  [63-79] 79  Resp:  [14-18] 18  SpO2:  [79 %-100 %] 93 %  BP: (118-136)/(72-99) 121/78     Weight: 47.2 kg (104 lb 0.9 oz)  Body mass index is 19.66 kg/m².    Physical Exam   Constitutional: She appears well-developed. No distress.   HENT:   Head: Normocephalic and atraumatic.   Mouth/Throat: No oropharyngeal exudate.   Eyes: EOM are normal. Pupils are equal, round, and reactive to light. No scleral icterus.   Neck: Normal range of motion. Neck supple.   Cardiovascular: Normal rate, regular rhythm, normal heart sounds and intact distal pulses.    No murmur heard.  Pulmonary/Chest: Effort normal. No respiratory distress. She has no wheezes.   Abdominal: Soft. Bowel sounds are normal. She exhibits no distension.   Musculoskeletal: Normal range of motion. She exhibits no edema.   Lymphadenopathy:     She has no cervical adenopathy.   Neurological: She is alert.   Skin: Skin is warm. She is not diaphoretic.   Diffuse ecchymoses and purpura    Psychiatric:   Able to respond with some single word answers; pleasant, alert and awake     Vitals reviewed.        CRANIAL  NERVES     CN III, IV, VI   Pupils are equal, round, and reactive to light.  Extraocular motions are normal.        Significant Labs:   Recent Lab Results       03/08/18  0836 03/08/18  0534 03/08/18  0232 03/08/18  0012 03/07/18  1613      Immature Granulocytes 3.9(H)         Immature Grans (Abs) 0.25  Comment:  Mild elevation in immature granulocytes is non specific and   can be seen in a variety of conditions including stress response,   acute inflammation, trauma and pregnancy. Correlation with other   laboratory and clinical findings is essential.  (H)         Albumin  2.4(L)        Alkaline Phosphatase  69        ALT  14        Anion Gap  11  10 17(H)     AST  24        Baso # 0.02         Basophil% 0.3         Total Bilirubin  0.3  Comment:  For infants and newborns, interpretation of results should be based  on gestational age, weight and in agreement with clinical  observations.  Premature Infant recommended reference ranges:  Up to 24 hours.............<8.0 mg/dL  Up to 48 hours............<12.0 mg/dL  3-5 days..................<15.0 mg/dL  6-29 days.................<15.0 mg/dL          BUN, Bld  9  11 15     Calcium  8.6(L)  8.4(L) 8.5(L)     Chloride  103  106 106     CO2  25  26 20(L)     Creatinine  0.6  0.6 0.6     Differential Method Automated         eGFR if   >60.0  >60.0 >60.0     eGFR if non   >60.0  Comment:  Calculation used to obtain the estimated glomerular filtration  rate (eGFR) is the CKD-EPI equation.     >60.0  Comment:  Calculation used to obtain the estimated glomerular filtration  rate (eGFR) is the CKD-EPI equation.    >60.0  Comment:  Calculation used to obtain the estimated glomerular filtration  rate (eGFR) is the CKD-EPI equation.        Eos # 0.1         Eosinophil% 1.1         Glucose  102  110 90     Gran # (ANC) 4.4         Gran% 69.0         Hematocrit 31.2(L)         Hemoglobin 9.9(L)         Lymph # 1.0         Lymph% 15.9(L)          Magnesium  1.5(L)        MCH 29.7         MCHC 31.7(L)         MCV 94         Mono # 0.6         Mono% 9.8         MPV 9.7         nRBC 0         Occult Blood   Positive(A)       Phosphorus  2.0(L)        Platelets 130(L)         Potassium  3.7  4.0 4.2     Total Protein  6.4        RBC 3.33(L)         RDW 14.4         Sodium  139  142 143     WBC 6.43                         Significant Imaging: I have reviewed all pertinent imaging results/findings within the past 24 hours.

## 2018-03-08 NOTE — PLAN OF CARE
Problem: Occupational Therapy Goal  Goal: Occupational Therapy Goal  Goals to be met by: 7 days     Patient will increase functional independence with ADLs by performing:    Feeding with Set-up Assistance.  UE Dressing with Set-up Assistance.  LE Dressing with Moderate Assistance.  Grooming while seated EOB with Stand-by/Set-up assistance.  Toileting from bedside commode with Moderate Assistance for hygiene and clothing management.   Supine to sit with Minimal Assistance.  Stand pivot transfer EOB->bedside chair with Moderate Assistance using RW.  Toilet transfer EOB->bedside commode with Moderate Assistance using RW.     Outcome: Ongoing (interventions implemented as appropriate)  OT goals remain appropriate.  RODDY Rand  3/8/2018

## 2018-03-08 NOTE — PLAN OF CARE
Extended Emergency Contact Information  Primary Emergency Contact: Scooter Seaman  Address: [address] [place] [address] [facility]  Home Phone: [phone]  Mobile Phone: [phone]  Relation: Son  Preferred language: English   needed? No  Secondary Emergency Contact: Akanksha Seaman   [facility]  Home Phone: [phone]  Relation: None    Rob Valladares MD  [address] [facility] [address] [place] [address]    No future appointments.      [facility] - [place], [place] - [address]  [address]  [place] [address]  Phone: [phone] Fax: [phone]    Payor: [employer] [facility] / Plan: BCBS ALL OUT OF STATE / Product Type: PPO /        03/08/18 1304   Discharge Assessment   Assessment Type Discharge Planning Assessment   Confirmed/corrected address and phone number on facesheet? Yes   Assessment information obtained from? Medical Record   Expected Length of Stay (days) 4   Prior to hospitilization cognitive status: Alert/Oriented   Prior to hospitalization functional status: Needs Assistance   Current cognitive status: Alert/Oriented   Current Functional Status: Needs Assistance   Lives With child(pilo), adult   Able to Return to Prior Arrangements yes   Is patient able to care for self after discharge? Unable to determine at this time (comments)   Patient's perception of discharge disposition home or selfcare   Readmission Within The Last 30 Days previous discharge plan unsuccessful   Patient currently being followed by outpatient case management? No   Patient currently receives any other outside agency services? No   Do you have any problems affording any of your prescribed medications? No   Is the patient taking medications as prescribed? yes   Does the patient have transportation home? Yes   Transportation Available family or friend will provide   Does the patient receive services at the [facility]? No   Discharge Plan A Home with  family   Discharge Plan B Home with family;Home Health   Patient/Family In Agreement With Plan unable to assess

## 2018-03-08 NOTE — PROGRESS NOTES
[facility] Hospital Medicine  Progress Note    Patient Name: Lisbeth Seaman  MRN: [id number]  Patient Class: IP- Inpatient   Admission Date: 3/6/2018  Length of Stay: 2 days  Attending Physician: Ovi Cordoba MD  Primary Care Provider: Rob Valladares MD    [facility] Medicine Team: [facility] 1 Micheline Crawley MD    Subjective:     Principal Problem:Encephalopathy, metabolic    HPI:  Lisbeth Seaman is a 63 y.o. Female with extensive PMH who has pmhx HLD, HTN, vascular dementia and stroke is [place] [facility] [place] failure to thrive since discharge from hospital three days ago. Patient is also reported to have history of NPH and needs neurosurgery evaluation for treatment. Daughter reports that pt has been unable to eat or drink, decrease mobility secondary to weakness, and confusion. She also reports decreased bowel movements however this is likely 2/2 to decreased PO intake as patient does have positive bowel sounds in all 4 quadrants. Family thinks that pt needs to be hospitalized due to declining health since discharge. Family contacted PCP today, who advised pt to go to ED for further evaluation.      Patient non-verbal on physical exam, oriented only to person. Family not at bedside and unable to be reached by phone.     Hospital Course:  03/08 NSGY to evaluate today; Day 2/3 of Abx for presumed UTI, cultures pending. SLP re-evaluation for some difficulties during PO intake     Interval History:  Patient doing well overnight; hypoxic readings during the night were erroneous; patient found saturating in high 90's on room air. Denies any pain at this time and is pleasant during rounds.  Awaiting NSGY recommendations at this time.     Review of Systems   Unable to perform ROS: Patient nonverbal   Constitutional: Negative for fever and unexpected weight change.   HENT: Positive for dental problem.    Respiratory: Negative for cough.    Cardiovascular: Negative for chest pain.    Gastrointestinal: Negative.    Neurological: Positive for weakness.     Objective:     Vital Signs (Most Recent):  Temp: 97.4 °F (36.3 °C) (03/08/18 1202)  Pulse: 79 (03/08/18 1202)  Resp: 18 (03/08/18 1202)  BP: 121/78 (03/08/18 1202)  SpO2: (!) 93 % (03/08/18 1202) Vital Signs (24h Range):  Temp:  [97.4 °F (36.3 °C)-98.6 °F (37 °C)] 97.4 °F (36.3 °C)  Pulse:  [63-79] 79  Resp:  [14-18] 18  SpO2:  [79 %-100 %] 93 %  BP: (118-136)/(72-99) 121/78     Weight: 47.2 kg (104 lb 0.9 oz)  Body mass index is 19.66 kg/m².    Physical Exam   Constitutional: She appears well-developed. No distress.   HENT:   Head: Normocephalic and atraumatic.   Mouth/Throat: No oropharyngeal exudate.   Eyes: EOM are normal. Pupils are equal, round, and reactive to light. No scleral icterus.   Neck: Normal range of motion. Neck supple.   Cardiovascular: Normal rate, regular rhythm, normal heart sounds and intact distal pulses.    No murmur heard.  Pulmonary/Chest: Effort normal. No respiratory distress. She has no wheezes.   Abdominal: Soft. Bowel sounds are normal. She exhibits no distension.   Musculoskeletal: Normal range of motion. She exhibits no edema.   Lymphadenopathy:     She has no cervical adenopathy.   Neurological: She is alert.   Skin: Skin is warm. She is not diaphoretic.   Diffuse ecchymoses and purpura    Psychiatric:   Able to respond with some single word answers; pleasant, alert and awake     Vitals reviewed.        CRANIAL NERVES     CN III, IV, VI   Pupils are equal, round, and reactive to light.  Extraocular motions are normal.        Significant Labs:   Recent Lab Results       03/08/18  0836 03/08/18  0534 03/08/18  0232 03/08/18  0012 03/07/18  1613      Immature Granulocytes 3.9(H)         Immature Grans (Abs) 0.25  Comment:  Mild elevation in immature granulocytes is non specific and   can be seen in a variety of conditions including stress response,   acute inflammation, trauma and pregnancy. Correlation with  other   laboratory and clinical findings is essential.  (H)         Albumin  2.4(L)        Alkaline Phosphatase  69        ALT  14        Anion Gap  11  10 17(H)     AST  24        Baso # 0.02         Basophil% 0.3         Total Bilirubin  0.3  Comment:  For infants and newborns, interpretation of results should be based  on gestational age, weight and in agreement with clinical  observations.  Premature Infant recommended reference ranges:  Up to 24 hours.............<8.0 mg/dL  Up to 48 hours............<12.0 mg/dL  3-5 days..................<15.0 mg/dL  6-29 days.................<15.0 mg/dL          BUN, Bld  9  11 15     Calcium  8.6(L)  8.4(L) 8.5(L)     Chloride  103  106 106     CO2  25  26 20(L)     Creatinine  0.6  0.6 0.6     Differential Method Automated         eGFR if   >60.0  >60.0 >60.0     eGFR if non   >60.0  Comment:  Calculation used to obtain the estimated glomerular filtration  rate (eGFR) is the CKD-EPI equation.     >60.0  Comment:  Calculation used to obtain the estimated glomerular filtration  rate (eGFR) is the CKD-EPI equation.    >60.0  Comment:  Calculation used to obtain the estimated glomerular filtration  rate (eGFR) is the CKD-EPI equation.        Eos # 0.1         Eosinophil% 1.1         Glucose  102  110 90     Gran # (ANC) 4.4         Gran% 69.0         Hematocrit 31.2(L)         Hemoglobin 9.9(L)         Lymph # 1.0         Lymph% 15.9(L)         Magnesium  1.5(L)        MCH 29.7         MCHC 31.7(L)         MCV 94         Mono # 0.6         Mono% 9.8         MPV 9.7         nRBC 0         Occult Blood   Positive(A)       Phosphorus  2.0(L)        Platelets 130(L)         Potassium  3.7  4.0 4.2     Total Protein  6.4        RBC 3.33(L)         RDW 14.4         Sodium  139  142 143     WBC 6.43                         Significant Imaging: I have reviewed all pertinent imaging results/findings within the past 24 hours.    Assessment/Plan:      *  Encephalopathy, metabolic    - Dx of NPH after a LP on previous admission w/ improvement based on pre- and post- LP PT evaluation  - NSGY to follow up;appreciate recommendations   - holding anticoagulation at this time           Acute cystitis with hematuria    - Ceftriaxone 1g Q24 X 3 days for treatment of presumed UTI; currently D2/3  - UA positive for nitrites, many bacteria and 35 WBC  - Cx--> Multiple organisms present; none in predominance. Will complete the 3 day course of Abx to ensure that there is no infection as there is a possibility of NSGY intervention during this admission  - WBC 6.43        Debility    -likely 2/2 to previous stroke  -PT/OT           Normal pressure hydrocephalus    - Mildly enlarged ventricles on previous scans  - Therapeutic/diagnostic tap w/ 7 cc off on previous admission did render improvement in symptoms; PT pre and post testing confirmed     - NSGY to give recommendations and scheduled for  shunt if appropriate   - Holding anticoagulation at this time        Hypertension, essential    - BP stable on this admission  - Will monitor         Vascular dementia without behavioral disturbance    - Continue Statin at this time  - Neurosurg consult placed   - Neuro checks q4  - Patient's current medical state is likely 2/2 combination of cebro-vascular disease and NPH component    Recent head Ct:  There is no acute abnormality.  There is no change compared to head CT dated 03/01/2018.  There is moderate generalized volume loss with marked nonspecific diffuse and confluent white matter change which likely reflect sequelae of severe microvascular ischemic disease.  There is a chronic infarction in the lateral right cerebellum. There also chronic infarctions in the basal ganglia and thalami.  There is no hemorrhage, mass or obvious acute infarction.  It should be noted that MRI is more sensitive in the detection of subtle or acute nonhemorrhagic ischemic disease.          VTE Risk  Mitigation         Ordered     Medium Risk of VTE  Once      03/06/18 2340     Place CHANDRAKANT hose  Until discontinued      03/06/18 2340     Place sequential compression device  Until discontinued      03/06/18 2340              Micheline Crawley MD  Department of Hospital Medicine   [facility]

## 2018-03-08 NOTE — ASSESSMENT & PLAN NOTE
64 yo F with increased confusion and failure to thrive with known NPH   - NPH is unlikely etiology of acute confusion, more likely associated with UTI.  Her cognition is improving since admission.   - Repeat heat CT head on 3/6 shows no acute abnormality, stable ventricular size compared to CT head on 3/1  - Pt had high volume LP 3/2/2018 with improvement of gait and some cognitive symptoms.  She would likely benefit from  shunt placement on non emergent basis when UTI resolved  - Recommend repeat UA/Urine culture when ABX course complete  - Continue current medical management per primary team, for UTI and + FOBT   - Pt with h/o thrombocytopenia - currently 130   - Would consider  Shunt placement Monday vs on outpatient basis pending progress   - Will continue to follow, please call with questions   - Discussed with Dr. Alexander

## 2018-03-09 ENCOUNTER — ANESTHESIA EVENT (OUTPATIENT)
Dept: SURGERY | Facility: HOSPITAL | Age: 63
DRG: 031 | End: 2018-03-09
Payer: COMMERCIAL

## 2018-03-09 LAB
ALBUMIN SERPL BCP-MCNC: 2.2 G/DL
ALP SERPL-CCNC: 67 U/L
ALT SERPL W/O P-5'-P-CCNC: 15 U/L
ANION GAP SERPL CALC-SCNC: 10 MMOL/L
ANION GAP SERPL CALC-SCNC: 7 MMOL/L
AST SERPL-CCNC: 19 U/L
BASOPHILS # BLD AUTO: 0.02 K/UL
BASOPHILS NFR BLD: 0.4 %
BILIRUB SERPL-MCNC: 0.4 MG/DL
BUN SERPL-MCNC: 7 MG/DL
BUN SERPL-MCNC: 8 MG/DL
CALCIUM SERPL-MCNC: 8.6 MG/DL
CALCIUM SERPL-MCNC: 9 MG/DL
CHLORIDE SERPL-SCNC: 102 MMOL/L
CHLORIDE SERPL-SCNC: 103 MMOL/L
CO2 SERPL-SCNC: 26 MMOL/L
CO2 SERPL-SCNC: 30 MMOL/L
CREAT SERPL-MCNC: 0.7 MG/DL
CREAT SERPL-MCNC: 0.7 MG/DL
CRP SERPL-MCNC: 7.8 MG/L
DIFFERENTIAL METHOD: ABNORMAL
EOSINOPHIL # BLD AUTO: 0 K/UL
EOSINOPHIL NFR BLD: 0.6 %
ERYTHROCYTE [DISTWIDTH] IN BLOOD BY AUTOMATED COUNT: 14.6 %
ERYTHROCYTE [SEDIMENTATION RATE] IN BLOOD BY WESTERGREN METHOD: 65 MM/HR
EST. GFR  (AFRICAN AMERICAN): >60 ML/MIN/1.73 M^2
EST. GFR  (AFRICAN AMERICAN): >60 ML/MIN/1.73 M^2
EST. GFR  (NON AFRICAN AMERICAN): >60 ML/MIN/1.73 M^2
EST. GFR  (NON AFRICAN AMERICAN): >60 ML/MIN/1.73 M^2
GLUCOSE SERPL-MCNC: 124 MG/DL
GLUCOSE SERPL-MCNC: 92 MG/DL
HCT VFR BLD AUTO: 27.8 %
HGB BLD-MCNC: 9.2 G/DL
IMM GRANULOCYTES # BLD AUTO: 0.16 K/UL
IMM GRANULOCYTES NFR BLD AUTO: 3.1 %
LYMPHOCYTES # BLD AUTO: 1 K/UL
LYMPHOCYTES NFR BLD: 20.2 %
MAGNESIUM SERPL-MCNC: 1.7 MG/DL
MCH RBC QN AUTO: 30.4 PG
MCHC RBC AUTO-ENTMCNC: 33.1 G/DL
MCV RBC AUTO: 92 FL
MONOCYTES # BLD AUTO: 0.4 K/UL
MONOCYTES NFR BLD: 7.8 %
NEUTROPHILS # BLD AUTO: 3.5 K/UL
NEUTROPHILS NFR BLD: 67.9 %
NRBC BLD-RTO: 0 /100 WBC
PHOSPHATE SERPL-MCNC: 1.7 MG/DL
PHOSPHATE SERPL-MCNC: 3.4 MG/DL
PLATELET # BLD AUTO: 131 K/UL
PMV BLD AUTO: 10.1 FL
POCT GLUCOSE: 109 MG/DL (ref 70–110)
POCT GLUCOSE: 68 MG/DL (ref 70–110)
POCT GLUCOSE: 81 MG/DL (ref 70–110)
POTASSIUM SERPL-SCNC: 3.3 MMOL/L
POTASSIUM SERPL-SCNC: 3.8 MMOL/L
PROT SERPL-MCNC: 5.8 G/DL
RBC # BLD AUTO: 3.03 M/UL
SODIUM SERPL-SCNC: 138 MMOL/L
SODIUM SERPL-SCNC: 140 MMOL/L
WBC # BLD AUTO: 5.15 K/UL

## 2018-03-09 PROCEDURE — 80048 BASIC METABOLIC PNL TOTAL CA: CPT

## 2018-03-09 PROCEDURE — 63600175 PHARM REV CODE 636 W HCPCS: Performed by: INTERNAL MEDICINE

## 2018-03-09 PROCEDURE — 83735 ASSAY OF MAGNESIUM: CPT

## 2018-03-09 PROCEDURE — 99232 SBSQ HOSP IP/OBS MODERATE 35: CPT | Mod: ,,, | Performed by: PHYSICIAN ASSISTANT

## 2018-03-09 PROCEDURE — 20600001 HC STEP DOWN PRIVATE ROOM

## 2018-03-09 PROCEDURE — 25000003 PHARM REV CODE 250: Performed by: STUDENT IN AN ORGANIZED HEALTH CARE EDUCATION/TRAINING PROGRAM

## 2018-03-09 PROCEDURE — 85651 RBC SED RATE NONAUTOMATED: CPT

## 2018-03-09 PROCEDURE — 84100 ASSAY OF PHOSPHORUS: CPT

## 2018-03-09 PROCEDURE — 25000003 PHARM REV CODE 250: Performed by: INTERNAL MEDICINE

## 2018-03-09 PROCEDURE — 97803 MED NUTRITION INDIV SUBSEQ: CPT

## 2018-03-09 PROCEDURE — 85025 COMPLETE CBC W/AUTO DIFF WBC: CPT

## 2018-03-09 PROCEDURE — 97112 NEUROMUSCULAR REEDUCATION: CPT

## 2018-03-09 PROCEDURE — 86140 C-REACTIVE PROTEIN: CPT

## 2018-03-09 PROCEDURE — 80053 COMPREHEN METABOLIC PANEL: CPT

## 2018-03-09 PROCEDURE — 99233 SBSQ HOSP IP/OBS HIGH 50: CPT | Mod: ,,, | Performed by: HOSPITALIST

## 2018-03-09 PROCEDURE — 97110 THERAPEUTIC EXERCISES: CPT

## 2018-03-09 PROCEDURE — 84100 ASSAY OF PHOSPHORUS: CPT | Mod: 91

## 2018-03-09 PROCEDURE — 36415 COLL VENOUS BLD VENIPUNCTURE: CPT

## 2018-03-09 PROCEDURE — 97535 SELF CARE MNGMENT TRAINING: CPT

## 2018-03-09 PROCEDURE — 97530 THERAPEUTIC ACTIVITIES: CPT

## 2018-03-09 RX ADMIN — BUSPIRONE HYDROCHLORIDE 15 MG: 10 TABLET ORAL at 07:03

## 2018-03-09 RX ADMIN — CEFTRIAXONE SODIUM 1 G: 1 INJECTION, POWDER, FOR SOLUTION INTRAMUSCULAR; INTRAVENOUS at 01:03

## 2018-03-09 RX ADMIN — ATORVASTATIN CALCIUM 40 MG: 20 TABLET, FILM COATED ORAL at 09:03

## 2018-03-09 RX ADMIN — Medication 400 MG: at 09:03

## 2018-03-09 RX ADMIN — POTASSIUM PHOSPHATE, MONOBASIC AND POTASSIUM PHOSPHATE, DIBASIC 30 MMOL: 224; 236 INJECTION, SOLUTION, CONCENTRATE INTRAVENOUS at 09:03

## 2018-03-09 NOTE — PLAN OF CARE
Problem: Patient Care Overview  Goal: Plan of Care Review    Recommendations     1. Continue dysphagia mechanical soft regular diet.   2. Encourage PO intake >50% and Boost Plus consumption TID to provide additional calories and promote wound healing.   3. RD to monitor and follow-up.    Assessment and Plan    Encephalopathy, metabolic     Nutrition Problem:  Inadequate oral intake     Related to (etiology):   Swallowing/chewing difficulty, increased physiological needs     Signs and Symptoms (as evidenced by):   Dysphagia, 2x PU of gluteal region      Interventions/Recommendations (treatment strategy):  See recs above     Nutrition Diagnosis Status:   Continues

## 2018-03-09 NOTE — PROGRESS NOTES
[facility]  Neurosurgery  Progress Note    Subjective:     History of Present Illness: Patient is 64 yo F h/o vascular dementia, stroke 9/2017, HTN, HLD, and NPH. She presents for evaluation of failure to thrive since discharge from hospital three days ago.  Per family functional status has been steadily declining since stroke in 9/2017 including not being able to ambulate on her own or dress, feed, or bath herself. Pt was hospitalized stay from 2/25/2018-3/3/2018,  Pt was evaluated by neurosurgery for NPH,  she had a high volume LP with   improvement of gait and cognitive symptoms, and was scheduled for  shunt placement back home in [place].  Since being home, she has been working with home health, her son reports one day after discharge stay she began to show increase in generalized weakness and confusion.  Pt was found to have UTI and currently admitted under medicine service.  Neurosurgery consulted for evaluation of NPH/possible  shunt placement.  Patient is unable to provide reliable history. Son at bedside provided history.           Post-Op Info:  Procedure(s) (LRB):  SHUNT- - WITH STEALTH - RIGHT (Right)         Interval History:  NAEON.  Pt more Alert today.  Denies HAs, N/V, visual changes, weakness, or seizures.        Medications:  Continuous Infusions:   lactated ringers Stopped (03/08/18 1130)     Scheduled Meds:   atorvastatin  40 mg Oral Daily    busPIRone  15 mg Oral Daily    magnesium oxide  400 mg Oral BID     PRN Meds:acetaminophen, dextrose 50%, dextrose 50%, glucagon (human recombinant), glucose, glucose, ramelteon, sodium chloride 0.9%     Review of Systems  Objective:     Weight: 47.2 kg (104 lb 0.9 oz)  Body mass index is 19.66 kg/m².  Vital Signs (Most Recent):  Temp: 98.5 °F (36.9 °C) (03/09/18 1617)  Pulse: 85 (03/09/18 1617)  Resp: 20 (03/09/18 1617)  BP: 111/76 (03/09/18 1617)  SpO2: 95 % (03/09/18 1617) Vital Signs (24h Range):  Temp:  [96.4 °F (35.8 °C)-98.5  °F (36.9 °C)] 98.5 °F (36.9 °C)  Pulse:  [72-85] 85  Resp:  [18-20] 20  SpO2:  [80 %-98 %] 95 %  BP: (108-138)/(76-88) 111/76       Date 03/09/18 0700 - 03/10/18 0659   Shift 6879-7218 0457-1084 1262-0875 24 Hour Total   I  N  T  A  K  E   P.O.  200  200    I.V.  (mL/kg)  100  (2.1)  100  (2.1)    IV Piggyback  500  500    Shift Total  (mL/kg)  800  (16.9)  800  (16.9)   O  U  T  P  U  T   Urine  (mL/kg/hr)  2  2    Shift Total  (mL/kg)  2  (0)  2  (0)   Weight (kg) 47.2 47.2 47.2 47.2                      Neurosurgery Physical Exam   General: no distress  Neurologic: Awake, Alert. Unable to identify location or time.   Head: normocephalic  Baseline Dementia   GCS: Motor: 6/Verbal: 4/Eyes: 4 GCS Total: 14  Cranial nerves: face symmetric, tongue midline, pupils equal, round, reactive to light with accomodation, extraocular muscles intact  Sensory: response to light touch throughout  Motor Strength: generalized weakness   No focal numbness or weakness  Lungs:  normal respiratory effort  Abdomen: soft, non-tender   Extremities: no cyanosis or edema, or clubbing    Significant Labs:    Recent Labs  Lab 03/08/18  0012 03/08/18  0534 03/09/18  0549    102 92    139 140   K 4.0 3.7 3.3*    103 103   CO2 26 25 30*   BUN 11 9 8   CREATININE 0.6 0.6 0.7   CALCIUM 8.4* 8.6* 9.0   MG  --  1.5* 1.7       Recent Labs  Lab 03/08/18  0836 03/09/18  0549   WBC 6.43 5.15   HGB 9.9* 9.2*   HCT 31.2* 27.8*   * 131*     No results for input(s): LABPT, INR, APTT in the last 48 hours.  Microbiology Results (last 7 days)     Procedure Component Value Units Date/Time    Urine culture [[id number]]     Order Status:  No result Specimen:  Urine from Urine, Clean Catch     Urine culture [[id number]]     Order Status:  Canceled Specimen:  Urine             Assessment/Plan:     Normal pressure hydrocephalus    64 yo F with increased confusion and failure to thrive with known NPH   - NPH is unlikely etiology of acute  confusion, more likely associated with UTI.  Her cognition is improving since admission.   - Repeat heat CT head on 3/6 shows no acute abnormality, stable ventricular size compared to CT head on 3/1  - Pt had high volume LP 3/2/2018 with improvement of gait and some cognitive symptoms.  She would likely benefit from  shunt placement on non emergent basis when UTI resolved  - Repeat UA/Urine culture pending  - Continue current medical management per primary team  - Pt with h/o thrombocytopenia - currently 130   - Will check baseline esr/crp  - Planning for  Shunt placement this coming Monday with Dr. Baker  - Stealth CT ordered for Sunday   - Will continue to follow, please call with questions   - Discussed with MICKI Paniagua  Neurosurgery  [facility]

## 2018-03-09 NOTE — ASSESSMENT & PLAN NOTE
- Stable  - Continue Statin at this time  - Neurosurg following  - Neuro checks q4  - Patient's current medical state is likely 2/2 combination of cebro-vascular disease and NPH component. Her mental status was not affected by the UTI and she remains stable

## 2018-03-09 NOTE — SUBJECTIVE & OBJECTIVE
Interval History:  NAEON.  Pt more Alert today.  Denies HAs, N/V, visual changes, weakness, or seizures.        Medications:  Continuous Infusions:   lactated ringers Stopped (03/08/18 1130)     Scheduled Meds:   atorvastatin  40 mg Oral Daily    busPIRone  15 mg Oral Daily    magnesium oxide  400 mg Oral BID     PRN Meds:acetaminophen, dextrose 50%, dextrose 50%, glucagon (human recombinant), glucose, glucose, ramelteon, sodium chloride 0.9%     Review of Systems  Objective:     Weight: 47.2 kg (104 lb 0.9 oz)  Body mass index is 19.66 kg/m².  Vital Signs (Most Recent):  Temp: 98.5 °F (36.9 °C) (03/09/18 1617)  Pulse: 85 (03/09/18 1617)  Resp: 20 (03/09/18 1617)  BP: 111/76 (03/09/18 1617)  SpO2: 95 % (03/09/18 1617) Vital Signs (24h Range):  Temp:  [96.4 °F (35.8 °C)-98.5 °F (36.9 °C)] 98.5 °F (36.9 °C)  Pulse:  [72-85] 85  Resp:  [18-20] 20  SpO2:  [80 %-98 %] 95 %  BP: (108-138)/(76-88) 111/76       Date 03/09/18 0700 - 03/10/18 0659   Shift 5965-0000 6972-8624 6602-8589 24 Hour Total   I  N  T  A  K  E   P.O.  200  200    I.V.  (mL/kg)  100  (2.1)  100  (2.1)    IV Piggyback  500  500    Shift Total  (mL/kg)  800  (16.9)  800  (16.9)   O  U  T  P  U  T   Urine  (mL/kg/hr)  2  2    Shift Total  (mL/kg)  2  (0)  2  (0)   Weight (kg) 47.2 47.2 47.2 47.2                      Neurosurgery Physical Exam   General: no distress  Neurologic: Awake, Alert. Unable to identify location or time.   Head: normocephalic  Baseline Dementia   GCS: Motor: 6/Verbal: 4/Eyes: 4 GCS Total: 14  Cranial nerves: face symmetric, tongue midline, pupils equal, round, reactive to light with accomodation, extraocular muscles intact  Sensory: response to light touch throughout  Motor Strength: generalized weakness   No focal numbness or weakness  Lungs:  normal respiratory effort  Abdomen: soft, non-tender   Extremities: no cyanosis or edema, or clubbing    Significant Labs:    Recent Labs  Lab 03/08/18  0012 03/08/18  0534 03/09/18  0549     102 92    139 140   K 4.0 3.7 3.3*    103 103   CO2 26 25 30*   BUN 11 9 8   CREATININE 0.6 0.6 0.7   CALCIUM 8.4* 8.6* 9.0   MG  --  1.5* 1.7       Recent Labs  Lab 03/08/18  0836 03/09/18  0549   WBC 6.43 5.15   HGB 9.9* 9.2*   HCT 31.2* 27.8*   * 131*     No results for input(s): LABPT, INR, APTT in the last 48 hours.  Microbiology Results (last 7 days)     Procedure Component Value Units Date/Time    Urine culture [[id number]]     Order Status:  No result Specimen:  Urine from Urine, Clean Catch     Urine culture [[id number]]     Order Status:  Canceled Specimen:  Urine

## 2018-03-09 NOTE — PROGRESS NOTES
"[facility]  Adult Nutrition  Progress Note    SUMMARY       Recommendations    1. Continue dysphagia mechanical soft regular diet.   2. Encourage PO intake >50% and Boost Plus consumption TID to provide additional calories and promote wound healing.   3. RD to monitor and follow-up.    Goals: PO intake >85% EEN,EPN  Nutrition Goal Status: goal not met  Communication of RD Recs: reviewed with RN    Reason for Assessment    Reason for Assessment: RD follow-up  Diagnosis: other (see comments) (Encephalopathy, metabolic)  Relevant Medical History: Stroke, HTN, HLD    General Information Comments: Pt non-verbal during visit, however, can answer questions using head motions. Pt remains <25% meal intake with no n/v/d/c. Pt consumed 25% Boost Plus.      Nutrition Discharge Planning: PO intake >85% EEN,EPN    Nutrition Risk Screen    Nutrition Risk Screen: dysphagia or difficulty swallowing    Nutrition/Diet History    Patient Reported Diet/Restrictions/Preferences: other (see comments) (patricia)  Food Preferences: No cultural or Congregation preferences.  Do you have any cultural, spiritual, Congregation conflicts, given your current situation?: no conflicts  Factors Affecting Nutritional Intake: decreased appetite, impaired cognitive status/motor control, difficulty/impaired swallowing    Anthropometrics    Height Method: Measured  Height: 5' 1" (154.9 cm)  Height (inches): 61 in  Weight Method: Standard Scale  Weight: 47.2 kg (104 lb 0.9 oz)  Weight (lb): 104.06 lb  Ideal Body Weight (IBW), Female: 105 lb  % Ideal Body Weight, Female (lb): 99.1 lb  BMI (Calculated): 19.7  BMI Grade: 18.5-24.9 - normal    Lab/Procedures/Meds    Pertinent Labs Reviewed: reviewed  Pertinent Labs Comments: K 3.3, P 1.7, Alb 2.2  Pertinent Medications Reviewed: reviewed  Pertinent Medications Comments: K, Mg, statin, IVF    Physical Findings/Assessment    Overall Physical Appearance: underweight  Oral/Mouth Cavity: WDL  Skin: " "pressure ulcer(s), dermatitis (2x PU gluteal region)    Estimated/Assessed Needs    Weight Used For Calorie Calculations: 47.2 kg (104 lb 0.9 oz)  Height: 5' 1" (154.9 cm)  Energy Calorie Requirements (kcal): 1267  Energy Need Method: Kcal/kg (1.3 PAL)  RMR (Crowley-St. Jeor Equation): 974.38  Protein Requirements: 57-66g/d (1.2-1.4 g/kg)  Weight Used For Protein Calculations: 47.2 kg (104 lb 0.9 oz)    Fluid Need Method: RDA Method (1 ml/kcal or per MD)  RDA Method (mL): 1267     Nutrition Prescription Ordered    Current Diet Order: Dysphagia Mechanical Soft   Nutrition Order Comments: Thin  Oral Nutrition Supplement: Boost Plus    Evaluation of Received Nutrient/Fluid Intake    IV Fluid (mL): 1200  Energy Calories Required: not meeting needs  Protein Required: not meeting needs  Fluid Required: other (see comments) (Per Md)  Comments: LBM 3/7  % Intake of Estimated Energy Needs: 0 - 25 %  % Meal Intake: 0 - 25 %    Nutrition Risk    Level of Risk/Frequency of Follow-up: moderate - high (2x/week)     Assessment and Plan    Encephalopathy, metabolic     Nutrition Problem:  Inadequate oral intake     Related to (etiology):   Swallowing/chewing difficulty, increased physiological needs     Signs and Symptoms (as evidenced by):   Dysphagia, 2x PU of gluteal region      Interventions/Recommendations (treatment strategy):  See recs above     Nutrition Diagnosis Status:   Continues     Monitor and Eval    Food and Nutrient Intake: energy intake, food and beverage intake  Food and Nutrient Adminstration: diet order  Knowledge/Beliefs/Attitudes: beliefs and attitudes, food and nutrition knowledge/skill  Physical Activity and Function: nutrition-related ADLs and IADLs  Anthropometric Measurements: height/length, weight, weight change, body mass index  Biochemical Data, Medical Tests and Procedures: electrolyte and renal panel, gastrointestinal profile, glucose/endocrine profile, inflammatory profile, lipid " profile  Nutrition-Focused Physical Findings: overall appearance     Nutrition Follow-Up    RD Follow-up?: Yes

## 2018-03-09 NOTE — PROGRESS NOTES
[facility] Hospital Medicine  Progress Note    Patient Name: Lisbeth Seaman  MRN: [id number]  Patient Class: IP- Inpatient   Admission Date: 3/6/2018  Length of Stay: 3 days  Attending Physician: Ovi Cordoba MD  Primary Care Provider: Rob Valladares MD    [facility] 1 Rohit Clemens MD    Subjective:     Principal Problem:Encephalopathy, metabolic    HPI:  Lisbeth Seaman is a 63 y.o. Female with extensive PMH who has pmhx HLD, HTN, vascular dementia and stroke is presenting to [facility] for evaluation failure to thrive since discharge from hospital three days ago. Patient is also reported to have history of NPH and needs neurosurgery evaluation for treatment. Daughter reports that pt has been unable to eat or drink, decrease mobility secondary to weakness, and confusion. She also reports decreased bowel movements however this is likely 2/2 to decreased PO intake as patient does have positive bowel sounds in all 4 quadrants. Family thinks that pt needs to be hospitalized due to declining health since discharge. Family contacted PCP today, who advised pt to go to ED for further evaluation.      Patient non-verbal on physical exam, oriented only to person. Family not at bedside and unable to be reached by phone.     Hospital Course:  03/08 NSGY to evaluate today; Day 2/3 of Abx for presumed UTI, cultures pending. SLP re-evaluation for some difficulties during PO intake   03/09 Doing well. Mental status stable. Completed ceftriaxone this morning with test for clearance pending.     Interval History:    Ms. Seaman felt well this morning. No acute events overnight, and she had no new complaints. She completed her CTX this morning. UA and UCx pending for clearance per NSGY recommendations.    Review of Systems   Constitutional: Negative for chills, fatigue and fever.   HENT: Negative for congestion and sore throat.    Eyes: Negative for visual disturbance.   Respiratory:  Negative for cough and shortness of breath.    Cardiovascular: Negative for chest pain, palpitations and leg swelling.   Gastrointestinal: Negative for abdominal distention, abdominal pain, constipation, diarrhea, nausea and vomiting.   Endocrine: Negative for polyuria.   Genitourinary: Negative for difficulty urinating and dysuria.   Musculoskeletal: Negative for myalgias.   Skin: Negative for rash and wound.   Allergic/Immunologic: Negative for immunocompromised state.   Neurological: Positive for weakness. Negative for dizziness and numbness.   Hematological: Negative for adenopathy.   Psychiatric/Behavioral: Negative for dysphoric mood. The patient is not nervous/anxious.      Objective:     Vital Signs (Most Recent):  Temp: 97 °F (36.1 °C) (03/09/18 1149)  Pulse: 82 (03/09/18 1149)  Resp: 18 (03/09/18 1149)  BP: 108/78 (03/09/18 1149)  SpO2: (!) 94 % (03/09/18 1149) Vital Signs (24h Range):  Temp:  [96.4 °F (35.8 °C)-98.2 °F (36.8 °C)] 97 °F (36.1 °C)  Pulse:  [72-83] 82  Resp:  [18-20] 18  SpO2:  [80 %-98 %] 94 %  BP: (108-138)/(76-88) 108/78     Weight: 47.2 kg (104 lb 0.9 oz)  Body mass index is 19.66 kg/m².  No intake or output data in the 24 hours ending 03/09/18 1238   Physical Exam    Significant Labs:     CBC:    Recent Labs  Lab 03/08/18  0836 03/09/18  0549   WBC 6.43 5.15   GRAN 69.0  4.4 67.9  3.5   HGB 9.9* 9.2*   HCT 31.2* 27.8*   * 131*       Chem 10:    Recent Labs  Lab 03/08/18  0012 03/08/18  0534 03/09/18  0549    139 140   K 4.0 3.7 3.3*    103 103   CO2 26 25 30*   BUN 11 9 8   CREATININE 0.6 0.6 0.7    102 92   CALCIUM 8.4* 8.6* 9.0   MG  --  1.5* 1.7   PHOS  --  2.0* 1.7*       LFTs:    Recent Labs  Lab 03/08/18  0534 03/09/18  0549   ALKPHOS 69 67   BILITOT 0.3 0.4   AST 24 19   ALT 14 15   ALBUMIN 2.4* 2.2*       Significant Imaging:   None new      Assessment/Plan:      * Encephalopathy, metabolic    - Stable  - Dx of NPH after a LP on previous admission w/  improvement based on pre- and post- LP PT evaluation. Her mental status remains improved since this intervention.  - NSGY following, appreciate assistance. Plans to monitor over the weekend with possible intervention next week          Debility    -likely 2/2 to previous stroke  -PT/OT following, recommending SNF, though this is unfortunately not an option for her given their financial situation. Continuing to discuss with case management        Normal pressure hydrocephalus    - Stable  - Mildly enlarged ventricles on previous scans  - Therapeutic/diagnostic tap w/ 7 cc off on previous admission did render improvement in symptoms; PT pre and post testing confirmed     - NSGY following  - Holding anticoagulation at this time        Hypophosphatemia    -- Following daily and replacing PRN        Hypokalemia    -- Following daily and replacing PRN        Hypertension, essential    - Stable  - Will continue to monitor        Vascular dementia without behavioral disturbance    - Stable  - Continue Statin at this time  - Neurosurg following  - Neuro checks q4  - Patient's current medical state is likely 2/2 combination of cebro-vascular disease and NPH component. Her mental status was not affected by the UTI and she remains stable          VTE Risk Mitigation         Ordered     Medium Risk of VTE  Once      03/06/18 2340     Place CHANDRAKANT hose  Until discontinued      03/06/18 2340     Place sequential compression device  Until discontinued      03/06/18 2340              Rohit Clemens MD  Department of Hospital Medicine   [facility]

## 2018-03-09 NOTE — PLAN OF CARE
Problem: Physical Therapy Goal  Goal: Physical Therapy Goal  Goals to be met by: 3/14/18    Patient will increase functional independence with mobility by performin. Supine to sit with Minimal Assistance  2. Sit to supine with Minimal Assistance  3. Sit to stand transfer with Minimal Assistance with least restrictive AD.   4. Bed to chair transfer with Moderate Assistance using least restrictive AD.   5. Gait  x 10 feet with Moderate Assistance using least restrictive AD.   6. Stand for 3 minutes with Minimal Assistance  7. Lower extremity exercise program x20 reps per handout, with assistance as needed     Outcome: Ongoing (interventions implemented as appropriate)  Pt goals remain appropriate.     HALI SHEPPARD, PT  3/9/2018

## 2018-03-09 NOTE — PLAN OF CARE
Problem: Patient Care Overview  Goal: Plan of Care Review  Outcome: Ongoing (interventions implemented as appropriate)  Patient remains free from injury or fall. blood glucose monitored. Encourage to eat and drink. Poor oral intake noted. IV lfuids infusing. Plan= possible shunt placement on Monday. Will continue to monitor.

## 2018-03-09 NOTE — ANESTHESIA PREPROCEDURE EVALUATION
03/09/2018  Lisbeth Seaman is a 63 y.o., female.    Pre-operative evaluation for Procedure(s) (LRB):  SHUNT- - WITH STEALTH - RIGHT (Right)    Lisbeth Seaman is a 63 y.o. female h/o vascular dementia, stroke 9/2017, HTN, HLD, and NPH going for the above procedure.    Son doing consents for pt. Pt w/ occasional disorientation, but cooperative.     LDA:   20g L forearm    Prev airway: none in system    Drips: LR @ 50ml/h    Patient Active Problem List   Diagnosis    Encephalopathy, metabolic    Pseudobulbar affect    Right bundle branch block    Vascular dementia without behavioral disturbance    Hyperlipidemia    CKD (chronic kidney disease), stage III    Anemia    Hypertension, essential    NPH (normal pressure hydrocephalus)    Hypokalemia    Hypophosphatemia    Urinary retention    Thrombocytopenia    Hypomagnesemia    Normal pressure hydrocephalus    Debility       Review of patient's allergies indicates:  No Known Allergies     No current facility-administered medications on file prior to encounter.      No current outpatient prescriptions on file prior to encounter.       History reviewed. No pertinent surgical history.    Social History     Social History    Marital status:      Spouse name: N/A    Number of children: N/A    Years of education: N/A     Occupational History    Not on file.     Social History Main Topics    Smoking status: Former Smoker    Smokeless tobacco: Not on file    Alcohol use 8.4 oz/week     14 Shots of liquor per week    Drug use: No    Sexual activity: Not on file     Other Topics Concern    Not on file     Social History Narrative    No narrative on file         Vital Signs Range (Last 24H):  Temp:  [35.8 °C (96.4 °F)-36.8 °C (98.2 °F)]   Pulse:  [72-83]   Resp:  [18-20]   BP: (108-138)/(76-88)   SpO2:  [80 %-98 %]       CBC:   Recent Labs       03/08/18   0836  03/09/18   0549   WBC  6.43  5.15   RBC  3.33*  3.03*   HGB  9.9*  9.2*   HCT  31.2*  27.8*   PLT  130*  131*   MCV  94  92   MCH  29.7  30.4   MCHC  31.7*  33.1       CMP:   Recent Labs      03/08/18   0534  03/09/18   0549   NA  139  140   K  3.7  3.3*   CL  103  103   CO2  25  30*   BUN  9  8   CREATININE  0.6  0.7   GLU  102  92   MG  1.5*  1.7   PHOS  2.0*  1.7*   CALCIUM  8.6*  9.0   ALBUMIN  2.4*  2.2*   PROT  6.4  5.8*   ALKPHOS  69  67   ALT  14  15   AST  24  19   BILITOT  0.3  0.4       INR  Recent Labs      03/06/18   2355   INR  1.0   APTT  21.1           Diagnostic Studies:      EKG:  Normal sinus rhythm  Nonspecific T wave abnormality  Abnormal ECG  No previous ECGs available    Anesthesia Evaluation    I have reviewed the Patient Summary Reports.    I have reviewed the Nursing Notes.   I have reviewed the Medications.     Review of Systems  Anesthesia Hx:  No problems with previous Anesthesia  History of prior surgery of interest to airway management or planning: Denies Family Hx of Anesthesia complications.   Denies Personal Hx of Anesthesia complications.   Social:  Non-Smoker, No Alcohol Use    Hematology/Oncology:     Oncology Normal    -- Anemia:   EENT/Dental:EENT/Dental Normal   Cardiovascular:   Hypertension, well controlled Dysrhythmias    Pulmonary:  Pulmonary Normal    Hepatic/GI:  Hepatic/GI Normal    Musculoskeletal:  Musculoskeletal Normal    Neurological:   CVA, residual symptoms    Endocrine:  Endocrine Normal    Psych:   Psychiatric History          Physical Exam  General:  Malnutrition    Airway/Jaw/Neck:  Airway Findings: Mouth Opening: < 3 cm General Airway Assessment: Adult  Mallampati: IV  TM Distance: 4 - 6 cm  Jaw/Neck Findings:  Neck ROM: Normal ROM  Airway exam had low patient effort   Eyes/Ears/Nose:  EYES/EARS/NOSE FINDINGS: Normal   Dental:  Dental Findings: Periodontal disease, Mild, Upper Dentures   Chest/Lungs:  Chest/Lungs Findings: Clear to  auscultation, Normal Respiratory Rate     Heart/Vascular:  Heart Findings: Rate: Normal  Rhythm: Regular Rhythm  Heart murmur: negative    Abdomen:  Abdomen Findings: Normal    Musculoskeletal:  Musculoskeletal Findings: Normal   Skin:  Skin Findings: Normal    Mental Status:  Mental Status Findings:  Cooperative, Confusion         Anesthesia Plan  Type of Anesthesia, risks & benefits discussed:  Anesthesia Type:  general  Patient's Preference:   Intra-op Monitoring Plan: arterial line and standard ASA monitors  Intra-op Monitoring Plan Comments:   Post Op Pain Control Plan: multimodal analgesia  Post Op Pain Control Plan Comments:   Induction:   IV  Beta Blocker:  Patient is not currently on a Beta-Blocker (No further documentation required).       Informed Consent: Patient representative understands risks and agrees with Anesthesia plan.  Questions answered. Anesthesia consent signed with patient representative.  ASA Score: 3     Day of Surgery Review of History & Physical:    H&P update referred to the surgeon.         Ready For Surgery From Anesthesia Perspective.

## 2018-03-09 NOTE — ASSESSMENT & PLAN NOTE
-likely 2/2 to previous stroke  -PT/OT following, recommending SNF, though this is unfortunately not an option for her given their financial situation. Continuing to discuss with case management

## 2018-03-09 NOTE — SUBJECTIVE & OBJECTIVE
Interval History:    Ms. Seaman felt well this morning. No acute events overnight, and she had no new complaints. She completed her CTX this morning. UA and UCx pending for clearance per NSGY recommendations.    Review of Systems   Constitutional: Negative for chills, fatigue and fever.   HENT: Negative for congestion and sore throat.    Eyes: Negative for visual disturbance.   Respiratory: Negative for cough and shortness of breath.    Cardiovascular: Negative for chest pain, palpitations and leg swelling.   Gastrointestinal: Negative for abdominal distention, abdominal pain, constipation, diarrhea, nausea and vomiting.   Endocrine: Negative for polyuria.   Genitourinary: Negative for difficulty urinating and dysuria.   Musculoskeletal: Negative for myalgias.   Skin: Negative for rash and wound.   Allergic/Immunologic: Negative for immunocompromised state.   Neurological: Positive for weakness. Negative for dizziness and numbness.   Hematological: Negative for adenopathy.   Psychiatric/Behavioral: Negative for dysphoric mood. The patient is not nervous/anxious.      Objective:     Vital Signs (Most Recent):  Temp: 97 °F (36.1 °C) (03/09/18 1149)  Pulse: 82 (03/09/18 1149)  Resp: 18 (03/09/18 1149)  BP: 108/78 (03/09/18 1149)  SpO2: (!) 94 % (03/09/18 1149) Vital Signs (24h Range):  Temp:  [96.4 °F (35.8 °C)-98.2 °F (36.8 °C)] 97 °F (36.1 °C)  Pulse:  [72-83] 82  Resp:  [18-20] 18  SpO2:  [80 %-98 %] 94 %  BP: (108-138)/(76-88) 108/78     Weight: 47.2 kg (104 lb 0.9 oz)  Body mass index is 19.66 kg/m².  No intake or output data in the 24 hours ending 03/09/18 1238   Physical Exam    Significant Labs:     CBC:    Recent Labs  Lab 03/08/18  0836 03/09/18  0549   WBC 6.43 5.15   GRAN 69.0  4.4 67.9  3.5   HGB 9.9* 9.2*   HCT 31.2* 27.8*   * 131*       Chem 10:    Recent Labs  Lab 03/08/18  0012 03/08/18  0534 03/09/18  0549    139 140   K 4.0 3.7 3.3*    103 103   CO2 26 25 30*   BUN 11 9 8    CREATININE 0.6 0.6 0.7    102 92   CALCIUM 8.4* 8.6* 9.0   MG  --  1.5* 1.7   PHOS  --  2.0* 1.7*       LFTs:    Recent Labs  Lab 03/08/18  0534 03/09/18  0549   ALKPHOS 69 67   BILITOT 0.3 0.4   AST 24 19   ALT 14 15   ALBUMIN 2.4* 2.2*       Significant Imaging:   None new

## 2018-03-09 NOTE — PLAN OF CARE
Problem: Occupational Therapy Goal  Goal: Occupational Therapy Goal  Goals to be met by: 7 days     Patient will increase functional independence with ADLs by performing:    Feeding with Set-up Assistance.  UE Dressing with Set-up Assistance.  LE Dressing with Moderate Assistance.  Grooming while seated EOB with Stand-by/Set-up assistance.  Toileting from bedside commode with Moderate Assistance for hygiene and clothing management.   Supine to sit with Minimal Assistance.  Stand pivot transfer EOB->bedside chair with Moderate Assistance using RW.  Toilet transfer EOB->bedside commode with Moderate Assistance using RW.     Outcome: Ongoing (interventions implemented as appropriate)  OT goals remain appropriate.  EZEQUIEL Manuel  3/9/2018

## 2018-03-09 NOTE — ASSESSMENT & PLAN NOTE
- Stable  - Mildly enlarged ventricles on previous scans  - Therapeutic/diagnostic tap w/ 7 cc off on previous admission did render improvement in symptoms; PT pre and post testing confirmed     - NSGY following  - Holding anticoagulation at this time

## 2018-03-09 NOTE — PT/OT/SLP PROGRESS
Physical Therapy Treatment    Patient Name:  Lisbeth Seaman   MRN:  [id number]    Recommendations:     Discharge Recommendations:  nursing facility, skilled   Discharge Equipment Recommendations: bedside commode   Barriers to discharge: Decreased caregiver support    Assessment:     Lisbeth Seaman is a 63 y.o. female admitted with a medical diagnosis of Encephalopathy, metabolic.  She presents with the following impairments/functional limitations:  weakness, gait instability, decreased upper extremity function, decreased ROM, decreased lower extremity function, impaired balance, impaired endurance, impaired coordination, impaired functional mobilty, decreased coordination, abnormal tone. Pt performed bed mobility max A and sat EOB CGA/min A. Pt will continue to benefit from skilled PT to improve deficits and increase overall functional mobility.     Rehab Prognosis:  Good; patient would benefit from acute skilled PT services to address these deficits and reach maximum level of function.      Recent Surgery: Procedure(s) (LRB):  SHUNT- - WITH STEALTH - RIGHT (Right)      Plan:     During this hospitalization, patient to be seen 4 x/week to address the above listed problems via gait training, therapeutic activities, therapeutic exercises, neuromuscular re-education, wheelchair management/training  · Plan of Care Expires:  04/05/18   Plan of Care Reviewed with: patient, son    Subjective     Communicated with RN prior to session.  Patient found supine in bed upon PT entry to room, agreeable to treatment.      Chief Complaint: stiffness   Patient comments/goals: return to PLOF  Pain/Comfort:  · Pain Rating 1: 0/10  · Pain Rating Post-Intervention 1: 0/10    Patients cultural, spiritual, Druze conflicts given the current situation: no conflicts    Objective:     Patient found with: telemetry, peripheral IV, bed alarm     General Precautions: Standard, fall, aspiration   Orthopedic Precautions:N/A   Braces: N/A      Functional Mobility:  Bed Mobility:     · Scooting: total assistance and of 2 persons  · Supine to Sit: maximal assistance  · Sit to Supine: maximal assistance    *sit to stand transfers not appropriate at this date 2* L ankle instability for joint protection.     AM-PAC 6 CLICK MOBILITY  Turning over in bed (including adjusting bedclothes, sheets and blankets)?: 2  Sitting down on and standing up from a chair with arms (e.g., wheelchair, bedside commode, etc.): 1  Moving from lying on back to sitting on the side of the bed?: 2  Moving to and from a bed to a chair (including a wheelchair)?: 1  Need to walk in hospital room?: 1  Climbing 3-5 steps with a railing?: 1  Total Score: 8       Therapeutic Activities and Exercises:  PT performed static stretching and PROM to B LE- ankle DF and knee flex x8 reps.  Pt positioned in hooklying and PT applied B rotation and static stretch for ~30sec hold x2 trials.  Pt sat EOB with CGA/min A for L lat and post lean.  PT applied static stretch to ant chest and neck musculature for ~30 sec hold x3 trials.  PT facilitated thoracic ext, scap ABD and ant pelvic tilt for upright posture.   PT applied B trunk rotation with static stretch for ~30sec hold x3 trials.   Pt in supine and towel roll placed under T spine, static stretch to ant chest musculare.  Pt educated on seated posture.     Patient left with bed in chair position with all lines intact, call button in reach and RN notified..    GOALS:    Physical Therapy Goals        Problem: Physical Therapy Goal    Goal Priority Disciplines Outcome Goal Variances Interventions   Physical Therapy Goal     PT/OT, PT Ongoing (interventions implemented as appropriate)     Description:  Goals to be met by: 3/14/18    Patient will increase functional independence with mobility by performin. Supine to sit with Minimal Assistance  2. Sit to supine with Minimal Assistance  3. Sit to stand transfer with Minimal Assistance with least  restrictive AD.   4. Bed to chair transfer with Moderate Assistance using least restrictive AD.   5. Gait  x 10 feet with Moderate Assistance using least restrictive AD.   6. Stand for 3 minutes with Minimal Assistance  7. Lower extremity exercise program x20 reps per handout, with assistance as needed                      Time Tracking:     PT Received On: 03/09/18  PT Start Time: 1351     PT Stop Time: 1423  PT Total Time (min): 32 min     Billable Minutes: Therapeutic Exercise 12 and Neuromuscular Re-education 20    Treatment Type: Treatment  PT/PTA: PT     PTA Visit Number: 0     HALI SHEPPARD, PT  03/09/2018

## 2018-03-09 NOTE — ASSESSMENT & PLAN NOTE
- Stable  - Dx of NPH after a LP on previous admission w/ improvement based on pre- and post- LP PT evaluation. Her mental status remains improved since this intervention.  - NSGY following, appreciate assistance. Plans to monitor over the weekend with possible intervention next week

## 2018-03-09 NOTE — ASSESSMENT & PLAN NOTE
62 yo F with increased confusion and failure to thrive with known NPH   - NPH is unlikely etiology of acute confusion, more likely associated with UTI.  Her cognition is improving since admission.   - Repeat heat CT head on 3/6 shows no acute abnormality, stable ventricular size compared to CT head on 3/1  - Pt had high volume LP 3/2/2018 with improvement of gait and some cognitive symptoms.  She would likely benefit from  shunt placement on non emergent basis when UTI resolved  - Repeat UA/Urine culture pending  - Continue current medical management per primary team  - Pt with h/o thrombocytopenia - currently 130   - Will check baseline esr/crp  - Planning for  Shunt placement this coming Monday with Dr. Baker  [facility]   - Will continue to follow, please call with questions   - Discussed with Dr. Baker

## 2018-03-09 NOTE — PHARMACY MED REC
Admission Medication Reconciliation - Pharmacy Consult Note    The home medication history was taken by Augusta Quach, Pharmacy Tech.     Potentially problematic discrepancies with current MAR  o Patient IS taking the following which was not ordered upon admit  o ASA 81 mg PO daily (holding)    o Patient IS NOT taking the following (cannot swallow) which was ordered upon admit  o Atorvastatin 40 mg PO daily  o Buspar 15 mg PO daily  o Mag ox 400 mg PO BID  o KCL 20 mEq PO daily    Please address this information as you see fit.  Feel free to contact us if you have any questions or require assistance.    Mahad Maria, PharmD  [id number]          Patient's prior to admission medication regimen was as follows:  No current outpatient prescriptions on file prior to encounter.         .    .

## 2018-03-10 LAB
ALBUMIN SERPL BCP-MCNC: 2.1 G/DL
ALP SERPL-CCNC: 68 U/L
ALT SERPL W/O P-5'-P-CCNC: 14 U/L
ANION GAP SERPL CALC-SCNC: 7 MMOL/L
AST SERPL-CCNC: 19 U/L
BASOPHILS # BLD AUTO: 0.02 K/UL
BASOPHILS NFR BLD: 0.4 %
BILIRUB SERPL-MCNC: 0.3 MG/DL
BILIRUB UR QL STRIP: NEGATIVE
BUN SERPL-MCNC: 6 MG/DL
CALCIUM SERPL-MCNC: 8.6 MG/DL
CHLORIDE SERPL-SCNC: 107 MMOL/L
CLARITY UR REFRACT.AUTO: ABNORMAL
CO2 SERPL-SCNC: 28 MMOL/L
COLOR UR AUTO: ABNORMAL
CREAT SERPL-MCNC: 0.6 MG/DL
DIFFERENTIAL METHOD: ABNORMAL
EOSINOPHIL # BLD AUTO: 0 K/UL
EOSINOPHIL NFR BLD: 0.4 %
ERYTHROCYTE [DISTWIDTH] IN BLOOD BY AUTOMATED COUNT: 14.8 %
EST. GFR  (AFRICAN AMERICAN): >60 ML/MIN/1.73 M^2
EST. GFR  (NON AFRICAN AMERICAN): >60 ML/MIN/1.73 M^2
GLUCOSE SERPL-MCNC: 114 MG/DL
GLUCOSE UR QL STRIP: NEGATIVE
HCT VFR BLD AUTO: 25.7 %
HGB BLD-MCNC: 8.5 G/DL
HGB UR QL STRIP: NEGATIVE
IMM GRANULOCYTES # BLD AUTO: 0.11 K/UL
IMM GRANULOCYTES NFR BLD AUTO: 2.1 %
KETONES UR QL STRIP: NEGATIVE
LEUKOCYTE ESTERASE UR QL STRIP: NEGATIVE
LYMPHOCYTES # BLD AUTO: 1.2 K/UL
LYMPHOCYTES NFR BLD: 22.8 %
MAGNESIUM SERPL-MCNC: 1.7 MG/DL
MCH RBC QN AUTO: 29.8 PG
MCHC RBC AUTO-ENTMCNC: 33.1 G/DL
MCV RBC AUTO: 90 FL
MONOCYTES # BLD AUTO: 0.5 K/UL
MONOCYTES NFR BLD: 8.5 %
NEUTROPHILS # BLD AUTO: 3.5 K/UL
NEUTROPHILS NFR BLD: 65.8 %
NITRITE UR QL STRIP: NEGATIVE
NRBC BLD-RTO: 0 /100 WBC
PH UR STRIP: 7 [PH] (ref 5–8)
PHOSPHATE SERPL-MCNC: 2.1 MG/DL
PLATELET # BLD AUTO: 135 K/UL
PMV BLD AUTO: 10.2 FL
POCT GLUCOSE: 74 MG/DL (ref 70–110)
POCT GLUCOSE: 97 MG/DL (ref 70–110)
POTASSIUM SERPL-SCNC: 3.6 MMOL/L
PROT SERPL-MCNC: 5.5 G/DL
PROT UR QL STRIP: NEGATIVE
RBC # BLD AUTO: 2.85 M/UL
SODIUM SERPL-SCNC: 142 MMOL/L
SP GR UR STRIP: 1 (ref 1–1.03)
URN SPEC COLLECT METH UR: ABNORMAL
UROBILINOGEN UR STRIP-ACNC: NEGATIVE EU/DL
WBC # BLD AUTO: 5.27 K/UL

## 2018-03-10 PROCEDURE — 87086 URINE CULTURE/COLONY COUNT: CPT

## 2018-03-10 PROCEDURE — 85025 COMPLETE CBC W/AUTO DIFF WBC: CPT

## 2018-03-10 PROCEDURE — 99233 SBSQ HOSP IP/OBS HIGH 50: CPT | Mod: ,,, | Performed by: HOSPITALIST

## 2018-03-10 PROCEDURE — 99232 SBSQ HOSP IP/OBS MODERATE 35: CPT | Mod: ,,, | Performed by: NEUROLOGICAL SURGERY

## 2018-03-10 PROCEDURE — 80053 COMPREHEN METABOLIC PANEL: CPT

## 2018-03-10 PROCEDURE — 99232 SBSQ HOSP IP/OBS MODERATE 35: CPT | Mod: ,,, | Performed by: PHYSICIAN ASSISTANT

## 2018-03-10 PROCEDURE — 25000003 PHARM REV CODE 250: Performed by: STUDENT IN AN ORGANIZED HEALTH CARE EDUCATION/TRAINING PROGRAM

## 2018-03-10 PROCEDURE — 20600001 HC STEP DOWN PRIVATE ROOM

## 2018-03-10 PROCEDURE — 83735 ASSAY OF MAGNESIUM: CPT

## 2018-03-10 PROCEDURE — 84100 ASSAY OF PHOSPHORUS: CPT

## 2018-03-10 PROCEDURE — 81003 URINALYSIS AUTO W/O SCOPE: CPT

## 2018-03-10 PROCEDURE — 36415 COLL VENOUS BLD VENIPUNCTURE: CPT

## 2018-03-10 RX ORDER — SULFAMETHOXAZOLE AND TRIMETHOPRIM 800; 160 MG/1; MG/1
1 TABLET ORAL 2 TIMES DAILY
Status: DISCONTINUED | OUTPATIENT
Start: 2018-03-10 | End: 2018-03-12

## 2018-03-10 RX ADMIN — BUSPIRONE HYDROCHLORIDE 15 MG: 10 TABLET ORAL at 05:03

## 2018-03-10 RX ADMIN — DEXTROSE MONOHYDRATE 20 MMOL: 5 INJECTION, SOLUTION INTRAVENOUS at 11:03

## 2018-03-10 RX ADMIN — Medication 400 MG: at 08:03

## 2018-03-10 RX ADMIN — ATORVASTATIN CALCIUM 40 MG: 20 TABLET, FILM COATED ORAL at 09:03

## 2018-03-10 RX ADMIN — DIBASIC SODIUM PHOSPHATE, MONOBASIC POTASSIUM PHOSPHATE AND MONOBASIC SODIUM PHOSPHATE 2 TABLET: 852; 155; 130 TABLET ORAL at 08:03

## 2018-03-10 RX ADMIN — DIBASIC SODIUM PHOSPHATE, MONOBASIC POTASSIUM PHOSPHATE AND MONOBASIC SODIUM PHOSPHATE 2 TABLET: 852; 155; 130 TABLET ORAL at 09:03

## 2018-03-10 RX ADMIN — SULFAMETHOXAZOLE AND TRIMETHOPRIM 1 TABLET: 800; 160 TABLET ORAL at 09:03

## 2018-03-10 RX ADMIN — SULFAMETHOXAZOLE AND TRIMETHOPRIM 1 TABLET: 800; 160 TABLET ORAL at 08:03

## 2018-03-10 RX ADMIN — Medication 400 MG: at 09:03

## 2018-03-10 NOTE — ASSESSMENT & PLAN NOTE
62 yo F with increased confusion and failure to thrive with known NPH   - NPH is unlikely etiology of acute confusion, more likely associated with UTI.  Her cognition is improving to prior baseline  - Repeat heat CT head on 3/6 shows no acute abnormality, stable ventricular size compared to CT head on 3/1  - Pt had high volume LP 3/2/2018 with improvement of gait and some cognitive symptoms.  She would likely benefit from  shunt placement on non emergent basis when UTI resolved  - Repeat UA/Urine culture pending  - Continue current medical management per primary team  - Pt with h/o thrombocytopenia - currently 130   - Baseline esr/crp 65, 7.8  - Planning for  Shunt placement this coming Monday with Dr. Baker  [facility]   - Will continue to follow, please call with questions   - Discussed with Dr. Baker

## 2018-03-10 NOTE — PROGRESS NOTES
[facility]  Neurosurgery  Progress Note    Subjective:     History of Present Illness: Patient is 62 yo F h/o vascular dementia, stroke 9/2017, HTN, HLD, and NPH. She presents for evaluation of failure to thrive since discharge from hospital three days ago.  Per family functional status has been steadily declining since stroke in 9/2017 including not being able to ambulate on her own or dress, feed, or bath herself. Pt was hospitalized stay from 2/25/2018-3/3/2018,  Pt was evaluated by neurosurgery for NPH,  she had a high volume LP with   improvement of gait and cognitive symptoms, and was scheduled for  shunt placement back home in [place].  Since being home, she has been working with home health, her son reports one day after discharge stay she began to show increase in generalized weakness and confusion.  Pt was found to have UTI and currently admitted under medicine service.  Neurosurgery consulted for evaluation of NPH/possible  shunt placement.  Patient is unable to provide reliable history. Son at bedside provided history.           Post-Op Info:  Procedure(s) (LRB):  SHUNT- - WITH STEALTH - RIGHT (Right)         Interval History:  NAEON.  No new complaints.  Denies HAs.  Denies N/V, visual changes, weakness, or seizures.        Medications:  Continuous Infusions:   lactated ringers Stopped (03/08/18 1130)     Scheduled Meds:   atorvastatin  40 mg Oral Daily    busPIRone  15 mg Oral Daily    k phos di & mono-sod phos mono  2 tablet Oral BID    magnesium oxide  400 mg Oral BID    potassium phosphate IVPB  20 mmol Intravenous Once    sulfamethoxazole-trimethoprim 800-160mg  1 tablet Oral BID     PRN Meds:acetaminophen, dextrose 50%, dextrose 50%, glucagon (human recombinant), glucose, glucose, ramelteon, sodium chloride 0.9%     Review of Systems  Objective:     Weight: 47.2 kg (104 lb 0.9 oz)  Body mass index is 19.66 kg/m².  Vital Signs (Most Recent):  Temp: 97.5 °F (36.4 °C)  (03/10/18 0725)  Pulse: 85 (03/10/18 0725)  Resp: 18 (03/10/18 0725)  BP: 131/78 (03/10/18 0725)  SpO2: 95 % (03/10/18 0725) Vital Signs (24h Range):  Temp:  [97 °F (36.1 °C)-98.5 °F (36.9 °C)] 97.5 °F (36.4 °C)  Pulse:  [76-85] 85  Resp:  [18-20] 18  SpO2:  [94 %-97 %] 95 %  BP: (108-131)/(75-78) 131/78                      Neurosurgery Physical Exam  Neurologic: Awake, Alert. Unable to identify location or time.   Head: normocephalic  Baseline Dementia   GCS: Motor: 6/Verbal: 4/Eyes: 4 GCS Total: 14  Cranial nerves: face symmetric, tongue midline, pupils equal, round, reactive to light with accomodation, extraocular muscles intact  Sensory: response to light touch throughout  Motor Strength: generalized weakness   No focal numbness or weakness  Lungs:  normal respiratory effort  Abdomen: soft, non-tender   Extremities: no cyanosis or edema, or clubbing    Significant Labs:    Recent Labs  Lab 03/09/18  0549 03/09/18  1853 03/10/18  0421   GLU 92 124* 114*    138 142   K 3.3* 3.8 3.6    102 107   CO2 30* 26 28   BUN 8 7* 6*   CREATININE 0.7 0.7 0.6   CALCIUM 9.0 8.6* 8.6*   MG 1.7  --  1.7       Recent Labs  Lab 03/09/18  0549 03/10/18  0421   WBC 5.15 5.27   HGB 9.2* 8.5*   HCT 27.8* 25.7*   * 135*     No results for input(s): LABPT, INR, APTT in the last 48 hours.  Microbiology Results (last 7 days)     Procedure Component Value Units Date/Time    Urine culture [[id number]]     Order Status:  No result Specimen:  Urine from Urine, Clean Catch     Urine culture [[id number]]     Order Status:  Canceled Specimen:  Urine               Assessment/Plan:     Normal pressure hydrocephalus    62 yo F with increased confusion and failure to thrive with known NPH   - NPH is unlikely etiology of acute confusion, more likely associated with UTI.  Her cognition is improving to prior baseline  - Repeat heat CT head on 3/6 shows no acute abnormality, stable ventricular size compared to CT head on 3/1  - Pt had  high volume LP 3/2/2018 with improvement of gait and some cognitive symptoms.  She would likely benefit from  shunt placement on non emergent basis when UTI resolved  - Repeat UA/Urine culture pending  - Continue current medical management per primary team  - Pt with h/o thrombocytopenia - currently 130   - Baseline esr/crp 65, 7.8  - Planning for  Shunt placement this coming Monday with Dr. Baker  - Stealth CT ordered for Sunday   - Will continue to follow, please call with questions   - Discussed with MICKI Paniagua  Neurosurgery  [facility]

## 2018-03-10 NOTE — SUBJECTIVE & OBJECTIVE
Interval History:  Ms. Seaman is doing well this morning, family at bedside assisting patient w/ PO intake. No acute events overnight, and she had no new complaints. UA and UCx pending for clearance per NSGY recommendations. Pending  shunt placement on Monday by NSGY.    Review of Systems   Unable to perform ROS: Patient nonverbal   Constitutional: Negative for fever and unexpected weight change.   HENT: Positive for dental problem.    Respiratory: Negative for cough.    Cardiovascular: Negative for chest pain.   Gastrointestinal: Negative.    Neurological: Positive for weakness.     Objective:     Vital Signs (Most Recent):  Temp: 97.5 °F (36.4 °C) (03/10/18 0725)  Pulse: 85 (03/10/18 0725)  Resp: 18 (03/10/18 0725)  BP: 131/78 (03/10/18 0725)  SpO2: 95 % (03/10/18 0725) Vital Signs (24h Range):  Temp:  [97.5 °F (36.4 °C)-98.5 °F (36.9 °C)] 97.5 °F (36.4 °C)  Pulse:  [76-85] 85  Resp:  [18-20] 18  SpO2:  [95 %-97 %] 95 %  BP: (111-131)/(75-78) 131/78     Weight: 47.2 kg (104 lb 0.9 oz)  Body mass index is 19.66 kg/m².    Physical Exam   Constitutional: She appears well-developed. No distress.   HENT:   Head: Normocephalic and atraumatic.   Mouth/Throat: No oropharyngeal exudate.   Eyes: EOM are normal. Pupils are equal, round, and reactive to light. No scleral icterus.   Neck: Normal range of motion. Neck supple.   Cardiovascular: Normal rate, regular rhythm, normal heart sounds and intact distal pulses.    No murmur heard.  Pulmonary/Chest: Effort normal. No respiratory distress. She has no wheezes.   Abdominal: Soft. Bowel sounds are normal. She exhibits no distension.   Musculoskeletal: Normal range of motion. She exhibits no edema.   Lymphadenopathy:     She has no cervical adenopathy.   Neurological: She is alert.   Skin: Skin is warm. She is not diaphoretic.   Diffuse ecchymoses and purpura    Psychiatric:   Able to respond with some single word answers; pleasant, alert and awake     Vitals  reviewed.        CRANIAL NERVES     CN III, IV, VI   Pupils are equal, round, and reactive to light.  Extraocular motions are normal.        Significant Labs:   Recent Lab Results       03/10/18  0421 03/09/18  1853 03/09/18  1615 03/09/18  1512      Immature Granulocytes 2.1(H)        Immature Grans (Abs) 0.11  Comment:  Mild elevation in immature granulocytes is non specific and   can be seen in a variety of conditions including stress response,   acute inflammation, trauma and pregnancy. Correlation with other   laboratory and clinical findings is essential.  (H)        Albumin 2.1(L)        Alkaline Phosphatase 68        ALT 14        Anion Gap 7(L) 10       AST 19        Baso # 0.02        Basophil% 0.4        Total Bilirubin 0.3  Comment:  For infants and newborns, interpretation of results should be based  on gestational age, weight and in agreement with clinical  observations.  Premature Infant recommended reference ranges:  Up to 24 hours.............<8.0 mg/dL  Up to 48 hours............<12.0 mg/dL  3-5 days..................<15.0 mg/dL  6-29 days.................<15.0 mg/dL          BUN, Bld 6(L) 7(L)       Calcium 8.6(L) 8.6(L)       Chloride 107 102       CO2 28 26       Creatinine 0.6 0.7       CRP    7.8     Differential Method Automated        eGFR if  >60.0 >60.0       eGFR if non  >60.0  Comment:  Calculation used to obtain the estimated glomerular filtration  rate (eGFR) is the CKD-EPI equation.    >60.0  Comment:  Calculation used to obtain the estimated glomerular filtration  rate (eGFR) is the CKD-EPI equation.          Eos # 0.0        Eosinophil% 0.4        Glucose 114(H) 124(H)       Gran # (ANC) 3.5        Gran% 65.8        Hematocrit 25.7(L)        Hemoglobin 8.5(L)        Lymph # 1.2        Lymph% 22.8        Magnesium 1.7        MCH 29.8        MCHC 33.1        MCV 90        Mono # 0.5        Mono% 8.5        MPV 10.2        nRBC 0        Phosphorus 2.1(L)  3.4       Platelets 135(L)        POCT Glucose   81      Potassium 3.6 3.8       Total Protein 5.5(L)        RBC 2.85(L)        RDW 14.8(H)        Sed Rate    65(H)     Sodium 142 138       WBC 5.27              Significant Imaging: I have reviewed all pertinent imaging results/findings within the past 24 hours.

## 2018-03-10 NOTE — SUBJECTIVE & OBJECTIVE
Interval History:  NAEON.  No new complaints.  Denies HAs.  Denies N/V, visual changes, weakness, or seizures.        Medications:  Continuous Infusions:   lactated ringers Stopped (03/08/18 1130)     Scheduled Meds:   atorvastatin  40 mg Oral Daily    busPIRone  15 mg Oral Daily    k phos di & mono-sod phos mono  2 tablet Oral BID    magnesium oxide  400 mg Oral BID    potassium phosphate IVPB  20 mmol Intravenous Once    sulfamethoxazole-trimethoprim 800-160mg  1 tablet Oral BID     PRN Meds:acetaminophen, dextrose 50%, dextrose 50%, glucagon (human recombinant), glucose, glucose, ramelteon, sodium chloride 0.9%     Review of Systems  Objective:     Weight: 47.2 kg (104 lb 0.9 oz)  Body mass index is 19.66 kg/m².  Vital Signs (Most Recent):  Temp: 97.5 °F (36.4 °C) (03/10/18 0725)  Pulse: 85 (03/10/18 0725)  Resp: 18 (03/10/18 0725)  BP: 131/78 (03/10/18 0725)  SpO2: 95 % (03/10/18 0725) Vital Signs (24h Range):  Temp:  [97 °F (36.1 °C)-98.5 °F (36.9 °C)] 97.5 °F (36.4 °C)  Pulse:  [76-85] 85  Resp:  [18-20] 18  SpO2:  [94 %-97 %] 95 %  BP: (108-131)/(75-78) 131/78                      Neurosurgery Physical Exam  Neurologic: Awake, Alert. Unable to identify location or time.   Head: normocephalic  Baseline Dementia   GCS: Motor: 6/Verbal: 4/Eyes: 4 GCS Total: 14  Cranial nerves: face symmetric, tongue midline, pupils equal, round, reactive to light with accomodation, extraocular muscles intact  Sensory: response to light touch throughout  Motor Strength: generalized weakness   No focal numbness or weakness  Lungs:  normal respiratory effort  Abdomen: soft, non-tender   Extremities: no cyanosis or edema, or clubbing    Significant Labs:    Recent Labs  Lab 03/09/18  0549 03/09/18  1853 03/10/18  0421   GLU 92 124* 114*    138 142   K 3.3* 3.8 3.6    102 107   CO2 30* 26 28   BUN 8 7* 6*   CREATININE 0.7 0.7 0.6   CALCIUM 9.0 8.6* 8.6*   MG 1.7  --  1.7       Recent Labs  Lab 03/09/18  0549  03/10/18  0421   WBC 5.15 5.27   HGB 9.2* 8.5*   HCT 27.8* 25.7*   * 135*     No results for input(s): LABPT, INR, APTT in the last 48 hours.  Microbiology Results (last 7 days)     Procedure Component Value Units Date/Time    Urine culture [[id number]]     Order Status:  No result Specimen:  Urine from Urine, Clean Catch     Urine culture [[id number]]     Order Status:  Canceled Specimen:  Urine

## 2018-03-10 NOTE — PROGRESS NOTES
[facility] Hospital Medicine  Progress Note    Patient Name: Lisbeth Seaman  MRN: [id number]  Patient Class: IP- Inpatient   Admission Date: 3/6/2018  Length of Stay: 4 days  Attending Physician: Ovi Cordoba MD  Primary Care Provider: Rob Valladares MD    [facility] Medicine Team: [facility] HOSP MED 1 Micheline Crawley MD    Subjective:     Principal Problem:Encephalopathy, metabolic    HPI:  Lisbeth Seaman is a 63 y.o. Female with extensive PMH who has pmhx HLD, HTN, vascular dementia and stroke is presenting to [facility] for evaluation failure to thrive since discharge from hospital three days ago. Patient is also reported to have history of NPH and needs neurosurgery evaluation for treatment. Daughter reports that pt has been unable to eat or drink, decrease mobility secondary to weakness, and confusion. She also reports decreased bowel movements however this is likely 2/2 to decreased PO intake as patient does have positive bowel sounds in all 4 quadrants. Family thinks that pt needs to be hospitalized due to declining health since discharge. Family contacted PCP today, who advised pt to go to ED for further evaluation.      Patient non-verbal on physical exam, oriented only to person. Family not at bedside and unable to be reached by phone.     Hospital Course:  03/08 NSGY to evaluate today; Day 2/3 of Abx for presumed UTI, cultures pending. SLP re-evaluation for some difficulties during PO intake   03/09 Doing well. Mental status stable. Completed ceftriaxone this morning with test for clearance pending.   03/10 Awaiting UA/Ucx to confirm bacterial clearance of UTI. No issues overnight; continuing to work on adequate PO intake.     Interval History:  Ms. Seaman is doing well this morning, family at bedside assisting patient w/ PO intake. No acute events overnight, and she had no new complaints. UA and UCx pending for clearance per NSGY recommendations. Pending  shunt placement on Monday by  NSGY.    Review of Systems   Unable to perform ROS: Patient nonverbal   Constitutional: Negative for fever and unexpected weight change.   HENT: Positive for dental problem.    Respiratory: Negative for cough.    Cardiovascular: Negative for chest pain.   Gastrointestinal: Negative.    Neurological: Positive for weakness.     Objective:     Vital Signs (Most Recent):  Temp: 97.5 °F (36.4 °C) (03/10/18 0725)  Pulse: 85 (03/10/18 0725)  Resp: 18 (03/10/18 0725)  BP: 131/78 (03/10/18 0725)  SpO2: 95 % (03/10/18 0725) Vital Signs (24h Range):  Temp:  [97.5 °F (36.4 °C)-98.5 °F (36.9 °C)] 97.5 °F (36.4 °C)  Pulse:  [76-85] 85  Resp:  [18-20] 18  SpO2:  [95 %-97 %] 95 %  BP: (111-131)/(75-78) 131/78     Weight: 47.2 kg (104 lb 0.9 oz)  Body mass index is 19.66 kg/m².    Physical Exam   Constitutional: She appears well-developed. No distress.   HENT:   Head: Normocephalic and atraumatic.   Mouth/Throat: No oropharyngeal exudate.   Eyes: EOM are normal. Pupils are equal, round, and reactive to light. No scleral icterus.   Neck: Normal range of motion. Neck supple.   Cardiovascular: Normal rate, regular rhythm, normal heart sounds and intact distal pulses.    No murmur heard.  Pulmonary/Chest: Effort normal. No respiratory distress. She has no wheezes.   Abdominal: Soft. Bowel sounds are normal. She exhibits no distension.   Musculoskeletal: Normal range of motion. She exhibits no edema.   Lymphadenopathy:     She has no cervical adenopathy.   Neurological: She is alert.   Skin: Skin is warm. She is not diaphoretic.   Diffuse ecchymoses and purpura    Psychiatric:   Able to respond with some single word answers; pleasant, alert and awake     Vitals reviewed.        CRANIAL NERVES     CN III, IV, VI   Pupils are equal, round, and reactive to light.  Extraocular motions are normal.        Significant Labs:   Recent Lab Results       03/10/18  0421 03/09/18  1853 03/09/18  1615 03/09/18  1512      Immature Granulocytes 2.1(H)         Immature Grans (Abs) 0.11  Comment:  Mild elevation in immature granulocytes is non specific and   can be seen in a variety of conditions including stress response,   acute inflammation, trauma and pregnancy. Correlation with other   laboratory and clinical findings is essential.  (H)        Albumin 2.1(L)        Alkaline Phosphatase 68        ALT 14        Anion Gap 7(L) 10       AST 19        Baso # 0.02        Basophil% 0.4        Total Bilirubin 0.3  Comment:  For infants and newborns, interpretation of results should be based  on gestational age, weight and in agreement with clinical  observations.  Premature Infant recommended reference ranges:  Up to 24 hours.............<8.0 mg/dL  Up to 48 hours............<12.0 mg/dL  3-5 days..................<15.0 mg/dL  6-29 days.................<15.0 mg/dL          BUN, Bld 6(L) 7(L)       Calcium 8.6(L) 8.6(L)       Chloride 107 102       CO2 28 26       Creatinine 0.6 0.7       CRP    7.8     Differential Method Automated        eGFR if  >60.0 >60.0       eGFR if non  >60.0  Comment:  Calculation used to obtain the estimated glomerular filtration  rate (eGFR) is the CKD-EPI equation.    >60.0  Comment:  Calculation used to obtain the estimated glomerular filtration  rate (eGFR) is the CKD-EPI equation.          Eos # 0.0        Eosinophil% 0.4        Glucose 114(H) 124(H)       Gran # (ANC) 3.5        Gran% 65.8        Hematocrit 25.7(L)        Hemoglobin 8.5(L)        Lymph # 1.2        Lymph% 22.8        Magnesium 1.7        MCH 29.8        MCHC 33.1        MCV 90        Mono # 0.5        Mono% 8.5        MPV 10.2        nRBC 0        Phosphorus 2.1(L) 3.4       Platelets 135(L)        POCT Glucose   81      Potassium 3.6 3.8       Total Protein 5.5(L)        RBC 2.85(L)        RDW 14.8(H)        Sed Rate    65(H)     Sodium 142 138       WBC 5.27              Significant Imaging: I have reviewed all pertinent imaging  results/findings within the past 24 hours.    Assessment/Plan:      * Encephalopathy, metabolic    - Stable  - Dx of NPH after a LP on previous admission w/ improvement based on pre- and post- LP PT evaluation. Her mental status remains improved since this intervention.  - NSGY following, appreciate assistance. Plans to monitor over the weekend with possible intervention next week          Debility    -likely 2/2 to previous stroke  -PT/OT following, recommending SNF, though this is unfortunately not an option for her given their financial situation. Continuing to discuss with case management        Normal pressure hydrocephalus    - Stable  - Mildly enlarged ventricles on previous scans  - Therapeutic/diagnostic tap w/ 7 cc off on previous admission did render improvement in symptoms; PT pre and post testing confirmed     - NSGY following  - Holding anticoagulation at this time        Hypophosphatemia    -- Following daily and replacing PRN        Hypokalemia    -- Following daily and replacing PRN        Hypertension, essential    - Stable  - Will continue to monitor        Vascular dementia without behavioral disturbance    - Stable  - Continue Statin at this time  - Neurosurg following  - Neuro checks q4  - Patient's current medical state is likely 2/2 combination of cebro-vascular disease and NPH component. Her mental status was not affected by the UTI and she remains stable          VTE Risk Mitigation         Ordered     Medium Risk of VTE  Once      03/06/18 2340     Place CHANDRAKANT hose  Until discontinued      03/06/18 2340     Place sequential compression device  Until discontinued      03/06/18 2340              Micheline Crawley MD  Department of Hospital Medicine   [facility]

## 2018-03-11 LAB
ALBUMIN SERPL BCP-MCNC: 2.1 G/DL
ALP SERPL-CCNC: 71 U/L
ALT SERPL W/O P-5'-P-CCNC: 14 U/L
ANION GAP SERPL CALC-SCNC: 9 MMOL/L
AST SERPL-CCNC: 20 U/L
BACTERIA UR CULT: NO GROWTH
BASOPHILS # BLD AUTO: 0.03 K/UL
BASOPHILS NFR BLD: 0.5 %
BILIRUB SERPL-MCNC: 0.4 MG/DL
BUN SERPL-MCNC: 4 MG/DL
CALCIUM SERPL-MCNC: 8.3 MG/DL
CHLORIDE SERPL-SCNC: 107 MMOL/L
CO2 SERPL-SCNC: 26 MMOL/L
CREAT SERPL-MCNC: 0.7 MG/DL
DIFFERENTIAL METHOD: ABNORMAL
EOSINOPHIL # BLD AUTO: 0 K/UL
EOSINOPHIL NFR BLD: 0.2 %
ERYTHROCYTE [DISTWIDTH] IN BLOOD BY AUTOMATED COUNT: 15.1 %
EST. GFR  (AFRICAN AMERICAN): >60 ML/MIN/1.73 M^2
EST. GFR  (NON AFRICAN AMERICAN): >60 ML/MIN/1.73 M^2
GLUCOSE SERPL-MCNC: 76 MG/DL
HCT VFR BLD AUTO: 27.4 %
HGB BLD-MCNC: 8.7 G/DL
IMM GRANULOCYTES # BLD AUTO: 0.09 K/UL
IMM GRANULOCYTES NFR BLD AUTO: 1.5 %
LYMPHOCYTES # BLD AUTO: 1.1 K/UL
LYMPHOCYTES NFR BLD: 18.2 %
MAGNESIUM SERPL-MCNC: 1.7 MG/DL
MCH RBC QN AUTO: 30.2 PG
MCHC RBC AUTO-ENTMCNC: 31.8 G/DL
MCV RBC AUTO: 95 FL
MONOCYTES # BLD AUTO: 0.6 K/UL
MONOCYTES NFR BLD: 10.4 %
NEUTROPHILS # BLD AUTO: 4.1 K/UL
NEUTROPHILS NFR BLD: 69.2 %
NRBC BLD-RTO: 0 /100 WBC
PHOSPHATE SERPL-MCNC: 3.3 MG/DL
PLATELET # BLD AUTO: 140 K/UL
PMV BLD AUTO: 11 FL
POCT GLUCOSE: 87 MG/DL (ref 70–110)
POCT GLUCOSE: 96 MG/DL (ref 70–110)
POTASSIUM SERPL-SCNC: 3.8 MMOL/L
PROT SERPL-MCNC: 5.3 G/DL
RBC # BLD AUTO: 2.88 M/UL
SODIUM SERPL-SCNC: 142 MMOL/L
WBC # BLD AUTO: 5.94 K/UL

## 2018-03-11 PROCEDURE — 99233 SBSQ HOSP IP/OBS HIGH 50: CPT | Mod: ,,, | Performed by: HOSPITALIST

## 2018-03-11 PROCEDURE — 83735 ASSAY OF MAGNESIUM: CPT

## 2018-03-11 PROCEDURE — 25000003 PHARM REV CODE 250: Performed by: STUDENT IN AN ORGANIZED HEALTH CARE EDUCATION/TRAINING PROGRAM

## 2018-03-11 PROCEDURE — 85025 COMPLETE CBC W/AUTO DIFF WBC: CPT

## 2018-03-11 PROCEDURE — 36415 COLL VENOUS BLD VENIPUNCTURE: CPT

## 2018-03-11 PROCEDURE — 84100 ASSAY OF PHOSPHORUS: CPT

## 2018-03-11 PROCEDURE — 80053 COMPREHEN METABOLIC PANEL: CPT

## 2018-03-11 PROCEDURE — 20600001 HC STEP DOWN PRIVATE ROOM

## 2018-03-11 RX ORDER — LANOLIN ALCOHOL/MO/W.PET/CERES
400 CREAM (GRAM) TOPICAL 2 TIMES DAILY
Refills: 0 | COMMUNITY
Start: 2018-03-11 | End: 2018-03-14 | Stop reason: HOSPADM

## 2018-03-11 RX ORDER — ATORVASTATIN CALCIUM 40 MG/1
40 TABLET, FILM COATED ORAL DAILY
Qty: 90 TABLET | Refills: 3 | Status: SHIPPED | OUTPATIENT
Start: 2018-03-12 | End: 2019-03-12

## 2018-03-11 RX ORDER — BUSPIRONE HYDROCHLORIDE 15 MG/1
15 TABLET ORAL DAILY
Qty: 30 TABLET | Refills: 11 | Status: SHIPPED | OUTPATIENT
Start: 2018-03-11 | End: 2018-03-13 | Stop reason: HOSPADM

## 2018-03-11 RX ADMIN — DIBASIC SODIUM PHOSPHATE, MONOBASIC POTASSIUM PHOSPHATE AND MONOBASIC SODIUM PHOSPHATE 2 TABLET: 852; 155; 130 TABLET ORAL at 08:03

## 2018-03-11 RX ADMIN — SULFAMETHOXAZOLE AND TRIMETHOPRIM 1 TABLET: 800; 160 TABLET ORAL at 08:03

## 2018-03-11 RX ADMIN — ATORVASTATIN CALCIUM 40 MG: 20 TABLET, FILM COATED ORAL at 09:03

## 2018-03-11 RX ADMIN — DIBASIC SODIUM PHOSPHATE, MONOBASIC POTASSIUM PHOSPHATE AND MONOBASIC SODIUM PHOSPHATE 2 TABLET: 852; 155; 130 TABLET ORAL at 09:03

## 2018-03-11 RX ADMIN — SULFAMETHOXAZOLE AND TRIMETHOPRIM 1 TABLET: 800; 160 TABLET ORAL at 09:03

## 2018-03-11 RX ADMIN — Medication 400 MG: at 09:03

## 2018-03-11 RX ADMIN — BUSPIRONE HYDROCHLORIDE 15 MG: 10 TABLET ORAL at 05:03

## 2018-03-11 NOTE — ASSESSMENT & PLAN NOTE
64 yo F with increased confusion and failure to thrive with known NPH     - NPH is unlikely etiology of acute confusion, more likely associated with UTI.   - Repeat heat CT head on 3/6 shows no acute abnormality, stable ventricular size compared to CT head on 3/1  - Pt had high volume LP 3/2/2018 with improvement of gait and some cognitive symptoms.  She would likely benefit from  shunt placement on non emergent basis when UTI resolved  - Repeat UA/Urine culture today.  - Continue current medical management per primary team  - Pt with h/o thrombocytopenia, will monitor.  - Baseline esr/crp 65, 7.8  - Planning for  Shunt placement this coming Monday with Dr. Baker  - Stealth CT in AM.  - NPO after MN.   - Will continue to follow, please call with questions

## 2018-03-11 NOTE — SUBJECTIVE & OBJECTIVE
Interval History:  NAEON.      Medications:  Continuous Infusions:   lactated ringers Stopped (03/08/18 1130)     Scheduled Meds:   atorvastatin  40 mg Oral Daily    busPIRone  15 mg Oral Daily    k phos di & mono-sod phos mono  2 tablet Oral BID    magnesium oxide  400 mg Oral BID    sulfamethoxazole-trimethoprim 800-160mg  1 tablet Oral BID     PRN Meds:acetaminophen, dextrose 50%, dextrose 50%, glucagon (human recombinant), glucose, glucose, ramelteon, sodium chloride 0.9%     Review of Systems    Objective:     Weight: 47.2 kg (104 lb 0.9 oz)  Body mass index is 19.66 kg/m².  Vital Signs (Most Recent):  Temp: 97.6 °F (36.4 °C) (03/11/18 0050)  Pulse: 92 (03/11/18 0050)  Resp: 18 (03/10/18 1953)  BP: (!) 130/90 (03/11/18 0050)  SpO2: (!) 93 % (03/11/18 0050) Vital Signs (24h Range):  Temp:  [96.3 °F (35.7 °C)-97.6 °F (36.4 °C)] 97.6 °F (36.4 °C)  Pulse:  [85-92] 92  Resp:  [18] 18  SpO2:  [93 %-96 %] 93 %  BP: (116-131)/(78-93) 130/90     Neurosurgery Physical Exam    Neurologic: Awake, Alert. Oriented to person only.  Head: normocephalic  Baseline Dementia   GCS: Motor: 6/Verbal: 4/Eyes: 4 GCS Total: 14  Cranial nerves: face symmetric, tongue midline, pupils equal, round, reactive to light with accomodation, extraocular muscles intact  Sensory: response to light touch throughout  Motor Strength: generalized weakness   No focal numbness or weakness  Lungs:  normal respiratory effort  Abdomen: soft  Extremities: no cyanosis or edema, or clubbing    Significant Labs:    Recent Labs  Lab 03/09/18  0549 03/09/18  1853 03/10/18  0421 03/11/18  0524   GLU 92 124* 114* 76    138 142 142   K 3.3* 3.8 3.6 3.8    102 107 107   CO2 30* 26 28 26   BUN 8 7* 6* 4*   CREATININE 0.7 0.7 0.6 0.7   CALCIUM 9.0 8.6* 8.6* 8.3*   MG 1.7  --  1.7 1.7       Recent Labs  Lab 03/09/18  0549 03/10/18  0421 03/11/18  0522   WBC 5.15 5.27 5.94   HGB 9.2* 8.5* 8.7*   HCT 27.8* 25.7* 27.4*   * 135* 140*     No results  for input(s): LABPT, INR, APTT in the last 48 hours.  Microbiology Results (last 7 days)     Procedure Component Value Units Date/Time    Urine culture [[id number]] Collected:  03/10/18 1135    Order Status:  Sent Specimen:  Urine from Urine, Catheterized Updated:  03/10/18 1811    Urine culture [[id number]]     Order Status:  Canceled Specimen:  Urine

## 2018-03-11 NOTE — ASSESSMENT & PLAN NOTE
- Stable  - Continue Statin at this time  - Neurosurg following  - Neuro checks q4  - Patient's current medical state is likely 2/2 combination of cebro-vascular disease and NPH component. Her mental status was not affected by the UTI and she remains stable  - Repeat UA w/o any evidence of UTI

## 2018-03-11 NOTE — PROGRESS NOTES
[facility]  Neurosurgery  Progress Note    Subjective:     History of Present Illness: Patient is 64 yo F h/o vascular dementia, stroke 9/2017, HTN, HLD, and NPH. She presents for evaluation of failure to thrive since discharge from hospital three days ago.  Per family functional status has been steadily declining since stroke in 9/2017 including not being able to ambulate on her own or dress, feed, or bath herself. Pt was hospitalized stay from 2/25/2018-3/3/2018,  Pt was evaluated by neurosurgery for NPH,  she had a high volume LP with   improvement of gait and cognitive symptoms, and was scheduled for  shunt placement back home in [place].  Since being home, she has been working with home health, her son reports one day after discharge stay she began to show increase in generalized weakness and confusion.  Pt was found to have UTI and currently admitted under medicine service.  Neurosurgery consulted for evaluation of NPH/possible  shunt placement.  Patient is unable to provide reliable history. Son at bedside provided history.           Post-Op Info:  Procedure(s) (LRB):  SHUNT- - WITH STEALTH - RIGHT (Right)         Interval History:  NAEON.      Medications:  Continuous Infusions:   lactated ringers Stopped (03/08/18 1130)     Scheduled Meds:   atorvastatin  40 mg Oral Daily    busPIRone  15 mg Oral Daily    k phos di & mono-sod phos mono  2 tablet Oral BID    magnesium oxide  400 mg Oral BID    sulfamethoxazole-trimethoprim 800-160mg  1 tablet Oral BID     PRN Meds:acetaminophen, dextrose 50%, dextrose 50%, glucagon (human recombinant), glucose, glucose, ramelteon, sodium chloride 0.9%     Review of Systems    Objective:     Weight: 47.2 kg (104 lb 0.9 oz)  Body mass index is 19.66 kg/m².  Vital Signs (Most Recent):  Temp: 97.6 °F (36.4 °C) (03/11/18 0050)  Pulse: 92 (03/11/18 0050)  Resp: 18 (03/10/18 1953)  BP: (!) 130/90 (03/11/18 0050)  SpO2: (!) 93 % (03/11/18 0050) Vital Signs  (24h Range):  Temp:  [96.3 °F (35.7 °C)-97.6 °F (36.4 °C)] 97.6 °F (36.4 °C)  Pulse:  [85-92] 92  Resp:  [18] 18  SpO2:  [93 %-96 %] 93 %  BP: (116-131)/(78-93) 130/90     Neurosurgery Physical Exam    Neurologic: Awake, Alert. Oriented to person only.  Head: normocephalic  Baseline Dementia   GCS: Motor: 6/Verbal: 4/Eyes: 4 GCS Total: 14  Cranial nerves: face symmetric, tongue midline, pupils equal, round, reactive to light with accomodation, extraocular muscles intact  Sensory: response to light touch throughout  Motor Strength: generalized weakness   No focal numbness or weakness  Lungs:  normal respiratory effort  Abdomen: soft  Extremities: no cyanosis or edema, or clubbing    Significant Labs:    Recent Labs  Lab 03/09/18  0549 03/09/18  1853 03/10/18  0421 03/11/18  0524   GLU 92 124* 114* 76    138 142 142   K 3.3* 3.8 3.6 3.8    102 107 107   CO2 30* 26 28 26   BUN 8 7* 6* 4*   CREATININE 0.7 0.7 0.6 0.7   CALCIUM 9.0 8.6* 8.6* 8.3*   MG 1.7  --  1.7 1.7       Recent Labs  Lab 03/09/18  0549 03/10/18  0421 03/11/18  0522   WBC 5.15 5.27 5.94   HGB 9.2* 8.5* 8.7*   HCT 27.8* 25.7* 27.4*   * 135* 140*     No results for input(s): LABPT, INR, APTT in the last 48 hours.  Microbiology Results (last 7 days)     Procedure Component Value Units Date/Time    Urine culture [[id number]] Collected:  03/10/18 1135    Order Status:  Sent Specimen:  Urine from Urine, Catheterized Updated:  03/10/18 1811    Urine culture [[id number]]     Order Status:  Canceled Specimen:  Urine               Assessment/Plan:     Normal pressure hydrocephalus    64 yo F with increased confusion and failure to thrive with known NPH     - NPH is unlikely etiology of acute confusion, more likely associated with UTI.   - Repeat heat CT head on 3/6 shows no acute abnormality, stable ventricular size compared to CT head on 3/1  - Pt had high volume LP 3/2/2018 with improvement of gait and some cognitive symptoms.  She would  likely benefit from  shunt placement on non emergent basis when UTI resolved  - Repeat UA/Urine culture today.  - Continue current medical management per primary team  - Pt with h/o thrombocytopenia, will monitor.  - Baseline esr/crp 65, 7.8  - Planning for  Shunt placement this coming Monday with Dr. Baker  - Stealth CT in AM.  - NPO after MN.   - Will continue to follow, please call with questions             Varun Martin MD  Neurosurgery  [facility]

## 2018-03-11 NOTE — PLAN OF CARE
Problem: Patient Care Overview  Goal: Plan of Care Review  Outcome: Ongoing (interventions implemented as appropriate)  Patient remains free from injury or fall. No neuro changes noted or reported. Poor oral intake noted. IV fluids infusing. Plan=  shunt placement 03/12/2018

## 2018-03-11 NOTE — SUBJECTIVE & OBJECTIVE
Interval History:  Ms. Seaman is doing well this morning. No acute events overnight, and she had no new complaints. Continuing to encourage PO intake, patient requires assistance and constant motivation.   Pending  shunt placement on Monday by NSGY.  Notes dependent edema of the R elbow; recent line infiltration noted. Will elevate the elbow and monitor for resolution     Review of Systems   Unable to perform ROS: Dementia   Constitutional: Negative for fever and unexpected weight change.   HENT: Positive for dental problem.    Respiratory: Negative for cough.    Cardiovascular: Negative for chest pain.   Gastrointestinal: Negative.    Neurological: Positive for weakness.     Objective:     Vital Signs (Most Recent):  Temp: 98.2 °F (36.8 °C) (03/11/18 1146)  Pulse: 97 (03/11/18 1146)  Resp: 18 (03/11/18 1146)  BP: 122/73 (03/11/18 1146)  SpO2: 96 % (03/11/18 1146) Vital Signs (24h Range):  Temp:  [96.3 °F (35.7 °C)-98.2 °F (36.8 °C)] 98.2 °F (36.8 °C)  Pulse:  [89-97] 97  Resp:  [18] 18  SpO2:  [93 %-96 %] 96 %  BP: (110-130)/(73-93) 122/73     Weight: 47.2 kg (104 lb 0.9 oz)  Body mass index is 19.66 kg/m².    Physical Exam   Constitutional: She appears well-developed. No distress.   HENT:   Head: Normocephalic and atraumatic.   Mouth/Throat: No oropharyngeal exudate.   Eyes: EOM are normal. Pupils are equal, round, and reactive to light. No scleral icterus.   Neck: Normal range of motion. Neck supple.   Cardiovascular: Normal rate, regular rhythm, normal heart sounds and intact distal pulses.    No murmur heard.  Pulmonary/Chest: Effort normal. No respiratory distress. She has no wheezes.   Abdominal: Soft. Bowel sounds are normal. She exhibits no distension.   Musculoskeletal: Normal range of motion. She exhibits no edema.   Lymphadenopathy:     She has no cervical adenopathy.   Neurological: She is alert.   Skin: Skin is warm. She is not diaphoretic.   Diffuse ecchymoses and purpura    Psychiatric:   Able to  respond with some single word answers; pleasant, alert and awake     Vitals reviewed.        CRANIAL NERVES     CN III, IV, VI   Pupils are equal, round, and reactive to light.  Extraocular motions are normal.        Significant Labs:   Recent Lab Results       03/11/18  0524 03/11/18  0522 03/10/18  1700 03/10/18  1659 03/10/18  1135      Immature Granulocytes  1.5(H)        Immature Grans (Abs)  0.09  Comment:  Mild elevation in immature granulocytes is non specific and   can be seen in a variety of conditions including stress response,   acute inflammation, trauma and pregnancy. Correlation with other   laboratory and clinical findings is essential.  (H)        Albumin 2.1(L)         Alkaline Phosphatase 71         ALT 14         Anion Gap 9         Appearance, UA     Hazy(A)     AST 20         Baso #  0.03        Basophil%  0.5        Bilirubin (UA)     Negative     Total Bilirubin 0.4  Comment:  For infants and newborns, interpretation of results should be based  on gestational age, weight and in agreement with clinical  observations.  Premature Infant recommended reference ranges:  Up to 24 hours.............<8.0 mg/dL  Up to 48 hours............<12.0 mg/dL  3-5 days..................<15.0 mg/dL  6-29 days.................<15.0 mg/dL           BUN, Bld 4(L)         Calcium 8.3(L)         Chloride 107         CO2 26         Color, UA     Straw     Creatinine 0.7         Differential Method  Automated        eGFR if  >60.0         eGFR if non  >60.0  Comment:  Calculation used to obtain the estimated glomerular filtration  rate (eGFR) is the CKD-EPI equation.            Eos #  0.0        Eosinophil%  0.2        Glucose 76         Glucose, UA     Negative     Gran # (ANC)  4.1        Gran%  69.2        Hematocrit  27.4(L)        Hemoglobin  8.7(L)        Ketones, UA     Negative     Leukocytes, UA     Negative     Lymph #  1.1        Lymph%  18.2        Magnesium 1.7         MCH   30.2        MCHC  31.8(L)        MCV  95        Mono #  0.6        Mono%  10.4        MPV  11.0        Nitrite, UA     Negative     nRBC  0        Occult Blood UA     Negative     pH, UA     7.0     Phosphorus 3.3         Platelets  140(L)        POCT Glucose   97 74      Potassium 3.8         Total Protein 5.3(L)         Protein, UA     Negative  Comment:  Recommend a 24 hour urine protein or a urine   protein/creatinine ratio if globulin induced proteinuria is  clinically suspected.       RBC  2.88(L)        RDW  15.1(H)        Sodium 142         Specific [place], UA     1.005     Specimen UA     Urine, Catheterized     Urobilinogen, UA     Negative     WBC  5.94                        Significant Imaging: I have reviewed all pertinent imaging results/findings within the past 24 hours.

## 2018-03-11 NOTE — SUBJECTIVE & OBJECTIVE
Interval History:  Ms. Seaman is doing well this morning. No acute events overnight, and she had no new complaints. Continuing to encourage PO intake, patient requires assistance and constant motivation.   Pending  shunt placement on Monday by NSGY.    Review of Systems   Unable to perform ROS: Dementia   Constitutional: Negative for fever and unexpected weight change.   HENT: Positive for dental problem.    Respiratory: Negative for cough.    Cardiovascular: Negative for chest pain.   Gastrointestinal: Negative.    Neurological: Positive for weakness.     Objective:     Vital Signs (Most Recent):  Temp: 97.8 °F (36.6 °C) (03/11/18 0725)  Pulse: 92 (03/11/18 0725)  Resp: 18 (03/11/18 0725)  BP: 110/75 (03/11/18 0725)  SpO2: 95 % (03/11/18 0725) Vital Signs (24h Range):  Temp:  [96.3 °F (35.7 °C)-97.8 °F (36.6 °C)] 97.8 °F (36.6 °C)  Pulse:  [89-92] 92  Resp:  [18] 18  SpO2:  [93 %-96 %] 95 %  BP: (110-130)/(75-93) 110/75     Weight: 47.2 kg (104 lb 0.9 oz)  Body mass index is 19.66 kg/m².    Physical Exam   Constitutional: She appears well-developed. No distress.   HENT:   Head: Normocephalic and atraumatic.   Mouth/Throat: No oropharyngeal exudate.   Eyes: EOM are normal. Pupils are equal, round, and reactive to light. No scleral icterus.   Neck: Normal range of motion. Neck supple.   Cardiovascular: Normal rate, regular rhythm, normal heart sounds and intact distal pulses.    No murmur heard.  Pulmonary/Chest: Effort normal. No respiratory distress. She has no wheezes.   Abdominal: Soft. Bowel sounds are normal. She exhibits no distension.   Musculoskeletal: Normal range of motion. She exhibits no edema.   Lymphadenopathy:     She has no cervical adenopathy.   Neurological: She is alert.   Skin: Skin is warm. She is not diaphoretic.   Diffuse ecchymoses and purpura    Psychiatric:   Able to respond with some single word answers; pleasant, alert and awake     Vitals reviewed.        CRANIAL NERVES     CN III,  IV, VI   Pupils are equal, round, and reactive to light.  Extraocular motions are normal.        Significant Labs:   Recent Lab Results       03/11/18  0524 03/11/18  0522 03/10/18  1700 03/10/18  1659 03/10/18  1135      Immature Granulocytes  1.5(H)        Immature Grans (Abs)  0.09  Comment:  Mild elevation in immature granulocytes is non specific and   can be seen in a variety of conditions including stress response,   acute inflammation, trauma and pregnancy. Correlation with other   laboratory and clinical findings is essential.  (H)        Albumin 2.1(L)         Alkaline Phosphatase 71         ALT 14         Anion Gap 9         Appearance, UA     Hazy(A)     AST 20         Baso #  0.03        Basophil%  0.5        Bilirubin (UA)     Negative     Total Bilirubin 0.4  Comment:  For infants and newborns, interpretation of results should be based  on gestational age, weight and in agreement with clinical  observations.  Premature Infant recommended reference ranges:  Up to 24 hours.............<8.0 mg/dL  Up to 48 hours............<12.0 mg/dL  3-5 days..................<15.0 mg/dL  6-29 days.................<15.0 mg/dL           BUN, Bld 4(L)         Calcium 8.3(L)         Chloride 107         CO2 26         Color, UA     Straw     Creatinine 0.7         Differential Method  Automated        eGFR if  >60.0         eGFR if non  >60.0  Comment:  Calculation used to obtain the estimated glomerular filtration  rate (eGFR) is the CKD-EPI equation.            Eos #  0.0        Eosinophil%  0.2        Glucose 76         Glucose, UA     Negative     Gran # (ANC)  4.1        Gran%  69.2        Hematocrit  27.4(L)        Hemoglobin  8.7(L)        Ketones, UA     Negative     Leukocytes, UA     Negative     Lymph #  1.1        Lymph%  18.2        Magnesium 1.7         MCH  30.2        MCHC  31.8(L)        MCV  95        Mono #  0.6        Mono%  10.4        MPV  11.0        Nitrite, UA      Negative     nRBC  0        Occult Blood UA     Negative     pH, UA     7.0     Phosphorus 3.3         Platelets  140(L)        POCT Glucose   97 74      Potassium 3.8         Total Protein 5.3(L)         Protein, UA     Negative  Comment:  Recommend a 24 hour urine protein or a urine   protein/creatinine ratio if globulin induced proteinuria is  clinically suspected.       RBC  2.88(L)        RDW  15.1(H)        Sodium 142         Specific [place], UA     1.005     Specimen UA     Urine, Catheterized     Urobilinogen, UA     Negative     WBC  5.94                        Significant Imaging: I have reviewed all pertinent imaging results/findings within the past 24 hours.

## 2018-03-11 NOTE — ASSESSMENT & PLAN NOTE
- Stable  - Dx of NPH after a LP on previous admission w/ improvement based on pre- and post- LP PT evaluation. Her mental status remains improved since this intervention.  - NSGY following, appreciate assistance.  - Plan for  shunt placement on Monday; NPO at midnight

## 2018-03-11 NOTE — PROGRESS NOTES
[facility] Hospital Medicine  Progress Note    Patient Name: Lisbeth Seaman  MRN: [id number]  Patient Class: IP- Inpatient   Admission Date: 3/6/2018  Length of Stay: 5 days  Attending Physician: Ovi Cordoba MD  Primary Care Provider: Rob Valladares MD    [facility] Medicine Team: [facility] HOSP MED 1 Micheline Crawley MD    Subjective:     Principal Problem:Encephalopathy, metabolic    HPI:  Lisbeth Seaman is a 63 y.o. Female with extensive PMH who has pmhx HLD, HTN, vascular dementia and stroke is presenting to [facility] for evaluation failure to thrive since discharge from hospital three days ago. Patient is also reported to have history of NPH and needs neurosurgery evaluation for treatment. Daughter reports that pt has been unable to eat or drink, decrease mobility secondary to weakness, and confusion. She also reports decreased bowel movements however this is likely 2/2 to decreased PO intake as patient does have positive bowel sounds in all 4 quadrants. Family thinks that pt needs to be hospitalized due to declining health since discharge. Family contacted PCP today, who advised pt to go to ED for further evaluation.      Patient non-verbal on physical exam, oriented only to person. Family not at bedside and unable to be reached by phone.     Hospital Course:  03/08 NSGY to evaluate today; Day 2/3 of Abx for presumed UTI, cultures pending. SLP re-evaluation for some difficulties during PO intake   03/09 Doing well. Mental status stable. Completed ceftriaxone this morning with test for clearance pending.   03/10 Awaiting UA/Ucx to confirm bacterial clearance of UTI. No issues overnight; continuing to work on adequate PO intake.   03/11 Repeat UA w/o any signs of infection. Patient stable.  shunt scheduled for Monday. NPO after midnight.    Interval History:  Ms. Seaman is doing well this morning. No acute events overnight, and she had no new complaints. Continuing to encourage PO intake, patient  requires assistance and constant motivation.   Pending  shunt placement on Monday by NSGY.    Review of Systems   Unable to perform ROS: Dementia   Constitutional: Negative for fever and unexpected weight change.   HENT: Positive for dental problem.    Respiratory: Negative for cough.    Cardiovascular: Negative for chest pain.   Gastrointestinal: Negative.    Neurological: Positive for weakness.     Objective:     Vital Signs (Most Recent):  Temp: 97.8 °F (36.6 °C) (03/11/18 0725)  Pulse: 92 (03/11/18 0725)  Resp: 18 (03/11/18 0725)  BP: 110/75 (03/11/18 0725)  SpO2: 95 % (03/11/18 0725) Vital Signs (24h Range):  Temp:  [96.3 °F (35.7 °C)-97.8 °F (36.6 °C)] 97.8 °F (36.6 °C)  Pulse:  [89-92] 92  Resp:  [18] 18  SpO2:  [93 %-96 %] 95 %  BP: (110-130)/(75-93) 110/75     Weight: 47.2 kg (104 lb 0.9 oz)  Body mass index is 19.66 kg/m².    Physical Exam   Constitutional: She appears well-developed. No distress.   HENT:   Head: Normocephalic and atraumatic.   Mouth/Throat: No oropharyngeal exudate.   Eyes: EOM are normal. Pupils are equal, round, and reactive to light. No scleral icterus.   Neck: Normal range of motion. Neck supple.   Cardiovascular: Normal rate, regular rhythm, normal heart sounds and intact distal pulses.    No murmur heard.  Pulmonary/Chest: Effort normal. No respiratory distress. She has no wheezes.   Abdominal: Soft. Bowel sounds are normal. She exhibits no distension.   Musculoskeletal: Normal range of motion. She exhibits no edema.   Lymphadenopathy:     She has no cervical adenopathy.   Neurological: She is alert.   Skin: Skin is warm. She is not diaphoretic.   Diffuse ecchymoses and purpura    Psychiatric:   Able to respond with some single word answers; pleasant, alert and awake     Vitals reviewed.        CRANIAL NERVES     CN III, IV, VI   Pupils are equal, round, and reactive to light.  Extraocular motions are normal.        Significant Labs:   Recent Lab Results       03/11/18 0524  03/11/18  0522 03/10/18  1700 03/10/18  1659 03/10/18  1135      Immature Granulocytes  1.5(H)        Immature Grans (Abs)  0.09  Comment:  Mild elevation in immature granulocytes is non specific and   can be seen in a variety of conditions including stress response,   acute inflammation, trauma and pregnancy. Correlation with other   laboratory and clinical findings is essential.  (H)        Albumin 2.1(L)         Alkaline Phosphatase 71         ALT 14         Anion Gap 9         Appearance, UA     Hazy(A)     AST 20         Baso #  0.03        Basophil%  0.5        Bilirubin (UA)     Negative     Total Bilirubin 0.4  Comment:  For infants and newborns, interpretation of results should be based  on gestational age, weight and in agreement with clinical  observations.  Premature Infant recommended reference ranges:  Up to 24 hours.............<8.0 mg/dL  Up to 48 hours............<12.0 mg/dL  3-5 days..................<15.0 mg/dL  6-29 days.................<15.0 mg/dL           BUN, Bld 4(L)         Calcium 8.3(L)         Chloride 107         CO2 26         Color, UA     Straw     Creatinine 0.7         Differential Method  Automated        eGFR if  >60.0         eGFR if non  >60.0  Comment:  Calculation used to obtain the estimated glomerular filtration  rate (eGFR) is the CKD-EPI equation.            Eos #  0.0        Eosinophil%  0.2        Glucose 76         Glucose, UA     Negative     Gran # (ANC)  4.1        Gran%  69.2        Hematocrit  27.4(L)        Hemoglobin  8.7(L)        Ketones, UA     Negative     Leukocytes, UA     Negative     Lymph #  1.1        Lymph%  18.2        Magnesium 1.7         MCH  30.2        MCHC  31.8(L)        MCV  95        Mono #  0.6        Mono%  10.4        MPV  11.0        Nitrite, UA     Negative     nRBC  0        Occult Blood UA     Negative     pH, UA     7.0     Phosphorus 3.3         Platelets  140(L)        POCT Glucose   97 74       Potassium 3.8         Total Protein 5.3(L)         Protein, UA     Negative  Comment:  Recommend a 24 hour urine protein or a urine   protein/creatinine ratio if globulin induced proteinuria is  clinically suspected.       RBC  2.88(L)        RDW  15.1(H)        Sodium 142         Specific [place], UA     1.005     Specimen UA     Urine, Catheterized     Urobilinogen, UA     Negative     WBC  5.94                        Significant Imaging: I have reviewed all pertinent imaging results/findings within the past 24 hours.    Assessment/Plan:      * Encephalopathy, metabolic    - Stable  - Dx of NPH after a LP on previous admission w/ improvement based on pre- and post- LP PT evaluation. Her mental status remains improved since this intervention.  - NSGY following, appreciate assistance.  - Plan for  shunt placement on Monday; NPO at midnight        Debility    -likely 2/2 to previous stroke  -PT/OT following, recommending SNF, though this is unfortunately not an option for her given their financial situation. Continuing to discuss with case management        Normal pressure hydrocephalus    - Stable  - Mildly enlarged ventricles on previous scans  - Therapeutic/diagnostic tap w/ 7 cc off on previous admission did render improvement in symptoms; PT pre and post testing confirmed     - NSGY following  - Holding anticoagulation at this time        Hypophosphatemia    - Following daily and replacing PRN        Hypokalemia    - Following daily and replacing PRN        Hypertension, essential    - Stable  - Will continue to monitor        Vascular dementia without behavioral disturbance    - Stable  - Continue Statin at this time  - Neurosurg following  - Neuro checks q4  - Patient's current medical state is likely 2/2 combination of cebro-vascular disease and NPH component. Her mental status was not affected by the UTI and she remains stable  - Repeat UA w/o any evidence of UTI           VTE Risk Mitigation          Ordered     Medium Risk of VTE  Once      03/06/18 2340     Place CHANDRAKANT hose  Until discontinued      03/06/18 2340     Place sequential compression device  Until discontinued      03/06/18 2340              Micheline Crawley MD  Department of Hospital Medicine   [facility]

## 2018-03-12 ENCOUNTER — ANESTHESIA (OUTPATIENT)
Dept: SURGERY | Facility: HOSPITAL | Age: 63
DRG: 031 | End: 2018-03-12
Payer: COMMERCIAL

## 2018-03-12 LAB
ABO + RH BLD: NORMAL
ALBUMIN SERPL BCP-MCNC: 2.3 G/DL
ALP SERPL-CCNC: 88 U/L
ALT SERPL W/O P-5'-P-CCNC: 15 U/L
ANION GAP SERPL CALC-SCNC: 7 MMOL/L
AST SERPL-CCNC: 26 U/L
BASOPHILS # BLD AUTO: 0.03 K/UL
BASOPHILS NFR BLD: 0.5 %
BILIRUB SERPL-MCNC: 0.5 MG/DL
BILIRUB UR QL STRIP: NEGATIVE
BLD GP AB SCN CELLS X3 SERPL QL: NORMAL
BUN SERPL-MCNC: 5 MG/DL
CALCIUM SERPL-MCNC: 8.9 MG/DL
CHLORIDE SERPL-SCNC: 107 MMOL/L
CLARITY UR REFRACT.AUTO: CLEAR
CO2 SERPL-SCNC: 27 MMOL/L
COLOR UR AUTO: NORMAL
CREAT SERPL-MCNC: 0.9 MG/DL
CRP SERPL-MCNC: 5.8 MG/L
DIFFERENTIAL METHOD: ABNORMAL
EOSINOPHIL # BLD AUTO: 0 K/UL
EOSINOPHIL NFR BLD: 0.3 %
ERYTHROCYTE [DISTWIDTH] IN BLOOD BY AUTOMATED COUNT: 15.3 %
ERYTHROCYTE [SEDIMENTATION RATE] IN BLOOD BY WESTERGREN METHOD: 65 MM/HR
EST. GFR  (AFRICAN AMERICAN): >60 ML/MIN/1.73 M^2
EST. GFR  (NON AFRICAN AMERICAN): >60 ML/MIN/1.73 M^2
GLUCOSE SERPL-MCNC: 82 MG/DL
GLUCOSE UR QL STRIP: NEGATIVE
HCT VFR BLD AUTO: 25.8 %
HGB BLD-MCNC: 8.2 G/DL
HGB UR QL STRIP: NEGATIVE
IMM GRANULOCYTES # BLD AUTO: 0.08 K/UL
IMM GRANULOCYTES NFR BLD AUTO: 1.2 %
KETONES UR QL STRIP: NEGATIVE
LEUKOCYTE ESTERASE UR QL STRIP: NEGATIVE
LYMPHOCYTES # BLD AUTO: 1.1 K/UL
LYMPHOCYTES NFR BLD: 16.3 %
MAGNESIUM SERPL-MCNC: 2 MG/DL
MCH RBC QN AUTO: 29.5 PG
MCHC RBC AUTO-ENTMCNC: 31.8 G/DL
MCV RBC AUTO: 93 FL
MONOCYTES # BLD AUTO: 0.5 K/UL
MONOCYTES NFR BLD: 7.1 %
NEUTROPHILS # BLD AUTO: 4.8 K/UL
NEUTROPHILS NFR BLD: 74.6 %
NITRITE UR QL STRIP: NEGATIVE
NRBC BLD-RTO: 0 /100 WBC
PH UR STRIP: 8 [PH] (ref 5–8)
PHOSPHATE SERPL-MCNC: 2.8 MG/DL
PLATELET # BLD AUTO: 144 K/UL
PMV BLD AUTO: 10.9 FL
POTASSIUM SERPL-SCNC: 4.5 MMOL/L
PROT SERPL-MCNC: 5.8 G/DL
PROT UR QL STRIP: NEGATIVE
RBC # BLD AUTO: 2.78 M/UL
SODIUM SERPL-SCNC: 141 MMOL/L
SP GR UR STRIP: 1.01 (ref 1–1.03)
URN SPEC COLLECT METH UR: NORMAL
UROBILINOGEN UR STRIP-ACNC: NEGATIVE EU/DL
WBC # BLD AUTO: 6.46 K/UL

## 2018-03-12 PROCEDURE — 25000003 PHARM REV CODE 250: Performed by: HOSPITALIST

## 2018-03-12 PROCEDURE — D9220A PRA ANESTHESIA: Mod: ,,, | Performed by: ANESTHESIOLOGY

## 2018-03-12 PROCEDURE — 99900035 HC TECH TIME PER 15 MIN (STAT)

## 2018-03-12 PROCEDURE — 36415 COLL VENOUS BLD VENIPUNCTURE: CPT

## 2018-03-12 PROCEDURE — 71000033 HC RECOVERY, INTIAL HOUR: Performed by: NEUROLOGICAL SURGERY

## 2018-03-12 PROCEDURE — 85025 COMPLETE CBC W/AUTO DIFF WBC: CPT

## 2018-03-12 PROCEDURE — 27000221 HC OXYGEN, UP TO 24 HOURS

## 2018-03-12 PROCEDURE — 94761 N-INVAS EAR/PLS OXIMETRY MLT: CPT

## 2018-03-12 PROCEDURE — 61781 SCAN PROC CRANIAL INTRA: CPT | Mod: ,,, | Performed by: NEUROLOGICAL SURGERY

## 2018-03-12 PROCEDURE — 62223 ESTABLISH BRAIN CAVITY SHUNT: CPT | Mod: ,,, | Performed by: NEUROLOGICAL SURGERY

## 2018-03-12 PROCEDURE — 00160J6 BYPASS CEREBRAL VENTRICLE TO PERITONEAL CAVITY WITH SYNTHETIC SUBSTITUTE, OPEN APPROACH: ICD-10-PCS | Performed by: NEUROLOGICAL SURGERY

## 2018-03-12 PROCEDURE — C9113 INJ PANTOPRAZOLE SODIUM, VIA: HCPCS | Performed by: STUDENT IN AN ORGANIZED HEALTH CARE EDUCATION/TRAINING PROGRAM

## 2018-03-12 PROCEDURE — 83735 ASSAY OF MAGNESIUM: CPT

## 2018-03-12 PROCEDURE — 81003 URINALYSIS AUTO W/O SCOPE: CPT

## 2018-03-12 PROCEDURE — 25000003 PHARM REV CODE 250: Performed by: NEUROLOGICAL SURGERY

## 2018-03-12 PROCEDURE — 25000003 PHARM REV CODE 250: Performed by: STUDENT IN AN ORGANIZED HEALTH CARE EDUCATION/TRAINING PROGRAM

## 2018-03-12 PROCEDURE — 63600175 PHARM REV CODE 636 W HCPCS: Performed by: STUDENT IN AN ORGANIZED HEALTH CARE EDUCATION/TRAINING PROGRAM

## 2018-03-12 PROCEDURE — 85651 RBC SED RATE NONAUTOMATED: CPT

## 2018-03-12 PROCEDURE — 37000008 HC ANESTHESIA 1ST 15 MINUTES: Performed by: NEUROLOGICAL SURGERY

## 2018-03-12 PROCEDURE — 36000711: Performed by: NEUROLOGICAL SURGERY

## 2018-03-12 PROCEDURE — 8E09XBZ COMPUTER ASSISTED PROCEDURE OF HEAD AND NECK REGION: ICD-10-PCS | Performed by: NEUROLOGICAL SURGERY

## 2018-03-12 PROCEDURE — 71000039 HC RECOVERY, EACH ADD'L HOUR: Performed by: NEUROLOGICAL SURGERY

## 2018-03-12 PROCEDURE — 37000009 HC ANESTHESIA EA ADD 15 MINS: Performed by: NEUROLOGICAL SURGERY

## 2018-03-12 PROCEDURE — 36000713 HC OR TIME LEV V EA ADD 15 MIN: Performed by: NEUROLOGICAL SURGERY

## 2018-03-12 PROCEDURE — 80053 COMPREHEN METABOLIC PANEL: CPT

## 2018-03-12 PROCEDURE — C1729 CATH, DRAINAGE: HCPCS | Performed by: NEUROLOGICAL SURGERY

## 2018-03-12 PROCEDURE — 86850 RBC ANTIBODY SCREEN: CPT

## 2018-03-12 PROCEDURE — 84100 ASSAY OF PHOSPHORUS: CPT

## 2018-03-12 PROCEDURE — 99233 SBSQ HOSP IP/OBS HIGH 50: CPT | Mod: ,,, | Performed by: HOSPITALIST

## 2018-03-12 PROCEDURE — 36000710: Performed by: NEUROLOGICAL SURGERY

## 2018-03-12 PROCEDURE — 36000712 HC OR TIME LEV V 1ST 15 MIN: Performed by: NEUROLOGICAL SURGERY

## 2018-03-12 PROCEDURE — 20600001 HC STEP DOWN PRIVATE ROOM

## 2018-03-12 PROCEDURE — 86140 C-REACTIVE PROTEIN: CPT

## 2018-03-12 PROCEDURE — 63600175 PHARM REV CODE 636 W HCPCS: Performed by: NEUROLOGICAL SURGERY

## 2018-03-12 PROCEDURE — 86920 COMPATIBILITY TEST SPIN: CPT

## 2018-03-12 PROCEDURE — 27201423 OPTIME MED/SURG SUP & DEVICES STERILE SUPPLY: Performed by: NEUROLOGICAL SURGERY

## 2018-03-12 DEVICE — RESERVOIR BURR HOLE UNITIZED: Type: IMPLANTABLE DEVICE | Site: CRANIAL | Status: FUNCTIONAL

## 2018-03-12 DEVICE — SHUNT CHPV INLINE SIPHON: Type: IMPLANTABLE DEVICE | Site: CRANIAL | Status: FUNCTIONAL

## 2018-03-12 DEVICE — KIT CATH WITH BACTISEAL: Type: IMPLANTABLE DEVICE | Site: CRANIAL | Status: FUNCTIONAL

## 2018-03-12 RX ORDER — NEOSTIGMINE METHYLSULFATE 1 MG/ML
INJECTION, SOLUTION INTRAVENOUS
Status: DISCONTINUED | OUTPATIENT
Start: 2018-03-12 | End: 2018-03-12

## 2018-03-12 RX ORDER — HYDROMORPHONE HYDROCHLORIDE 1 MG/ML
0.2 INJECTION, SOLUTION INTRAMUSCULAR; INTRAVENOUS; SUBCUTANEOUS EVERY 5 MIN PRN
Status: DISCONTINUED | OUTPATIENT
Start: 2018-03-12 | End: 2018-03-12 | Stop reason: HOSPADM

## 2018-03-12 RX ORDER — KETAMINE HCL IN 0.9 % NACL 50 MG/5 ML
SYRINGE (ML) INTRAVENOUS
Status: DISCONTINUED | OUTPATIENT
Start: 2018-03-12 | End: 2018-03-12

## 2018-03-12 RX ORDER — DEXAMETHASONE SODIUM PHOSPHATE 4 MG/ML
INJECTION, SOLUTION INTRA-ARTICULAR; INTRALESIONAL; INTRAMUSCULAR; INTRAVENOUS; SOFT TISSUE
Status: DISCONTINUED | OUTPATIENT
Start: 2018-03-12 | End: 2018-03-12

## 2018-03-12 RX ORDER — ONDANSETRON 2 MG/ML
4 INJECTION INTRAMUSCULAR; INTRAVENOUS DAILY PRN
Status: DISCONTINUED | OUTPATIENT
Start: 2018-03-12 | End: 2018-03-12 | Stop reason: HOSPADM

## 2018-03-12 RX ORDER — LIDOCAINE HCL/PF 100 MG/5ML
SYRINGE (ML) INTRAVENOUS
Status: DISCONTINUED | OUTPATIENT
Start: 2018-03-12 | End: 2018-03-12

## 2018-03-12 RX ORDER — GLYCOPYRROLATE 0.2 MG/ML
INJECTION INTRAMUSCULAR; INTRAVENOUS
Status: DISCONTINUED | OUTPATIENT
Start: 2018-03-12 | End: 2018-03-12

## 2018-03-12 RX ORDER — ROCURONIUM BROMIDE 10 MG/ML
INJECTION, SOLUTION INTRAVENOUS
Status: DISCONTINUED | OUTPATIENT
Start: 2018-03-12 | End: 2018-03-12

## 2018-03-12 RX ORDER — PHENYLEPHRINE HYDROCHLORIDE 10 MG/ML
INJECTION INTRAVENOUS
Status: DISCONTINUED | OUTPATIENT
Start: 2018-03-12 | End: 2018-03-12

## 2018-03-12 RX ORDER — LIDOCAINE HYDROCHLORIDE 10 MG/ML
1 INJECTION, SOLUTION EPIDURAL; INFILTRATION; INTRACAUDAL; PERINEURAL ONCE
Status: DISCONTINUED | OUTPATIENT
Start: 2018-03-12 | End: 2018-03-12

## 2018-03-12 RX ORDER — FENTANYL CITRATE 50 UG/ML
INJECTION, SOLUTION INTRAMUSCULAR; INTRAVENOUS
Status: DISCONTINUED | OUTPATIENT
Start: 2018-03-12 | End: 2018-03-12

## 2018-03-12 RX ORDER — ACETAMINOPHEN 10 MG/ML
INJECTION, SOLUTION INTRAVENOUS
Status: DISCONTINUED | OUTPATIENT
Start: 2018-03-12 | End: 2018-03-12

## 2018-03-12 RX ORDER — ONDANSETRON 2 MG/ML
INJECTION INTRAMUSCULAR; INTRAVENOUS
Status: DISCONTINUED | OUTPATIENT
Start: 2018-03-12 | End: 2018-03-12

## 2018-03-12 RX ORDER — LIDOCAINE HYDROCHLORIDE AND EPINEPHRINE 15; 5 MG/ML; UG/ML
INJECTION, SOLUTION EPIDURAL
Status: DISCONTINUED | OUTPATIENT
Start: 2018-03-12 | End: 2018-03-12

## 2018-03-12 RX ORDER — SODIUM CHLORIDE 9 MG/ML
INJECTION, SOLUTION INTRAVENOUS CONTINUOUS
Status: DISCONTINUED | OUTPATIENT
Start: 2018-03-12 | End: 2018-03-12

## 2018-03-12 RX ORDER — SODIUM CHLORIDE 9 MG/ML
INJECTION, SOLUTION INTRAVENOUS CONTINUOUS
Status: DISCONTINUED | OUTPATIENT
Start: 2018-03-12 | End: 2018-03-13

## 2018-03-12 RX ORDER — ACETAMINOPHEN 325 MG/1
650 TABLET ORAL EVERY 6 HOURS PRN
Status: DISCONTINUED | OUTPATIENT
Start: 2018-03-12 | End: 2018-03-22 | Stop reason: HOSPADM

## 2018-03-12 RX ORDER — SODIUM CHLORIDE 0.9 % (FLUSH) 0.9 %
3 SYRINGE (ML) INJECTION
Status: DISCONTINUED | OUTPATIENT
Start: 2018-03-12 | End: 2018-03-22 | Stop reason: HOSPADM

## 2018-03-12 RX ORDER — ACETAMINOPHEN 325 MG/1
650 TABLET ORAL EVERY 4 HOURS PRN
Status: DISCONTINUED | OUTPATIENT
Start: 2018-03-12 | End: 2018-03-14

## 2018-03-12 RX ORDER — HYDROCODONE BITARTRATE AND ACETAMINOPHEN 5; 325 MG/1; MG/1
1 TABLET ORAL EVERY 4 HOURS PRN
Status: DISCONTINUED | OUTPATIENT
Start: 2018-03-12 | End: 2018-03-22 | Stop reason: HOSPADM

## 2018-03-12 RX ORDER — PROPOFOL 10 MG/ML
VIAL (ML) INTRAVENOUS
Status: DISCONTINUED | OUTPATIENT
Start: 2018-03-12 | End: 2018-03-12

## 2018-03-12 RX ORDER — BACITRACIN 50000 [IU]/1
INJECTION, POWDER, FOR SOLUTION INTRAMUSCULAR
Status: DISCONTINUED | OUTPATIENT
Start: 2018-03-12 | End: 2018-03-12

## 2018-03-12 RX ORDER — ONDANSETRON 8 MG/1
8 TABLET, ORALLY DISINTEGRATING ORAL EVERY 8 HOURS PRN
Status: DISCONTINUED | OUTPATIENT
Start: 2018-03-12 | End: 2018-03-22 | Stop reason: HOSPADM

## 2018-03-12 RX ORDER — ACETAMINOPHEN 650 MG/1
650 SUPPOSITORY RECTAL EVERY 6 HOURS PRN
Status: DISCONTINUED | OUTPATIENT
Start: 2018-03-12 | End: 2018-03-14

## 2018-03-12 RX ORDER — PANTOPRAZOLE SODIUM 40 MG/1
40 TABLET, DELAYED RELEASE ORAL DAILY
Status: DISCONTINUED | OUTPATIENT
Start: 2018-03-13 | End: 2018-03-22 | Stop reason: HOSPADM

## 2018-03-12 RX ADMIN — CEFTRIAXONE 2 G: 1 INJECTION, SOLUTION INTRAVENOUS at 08:03

## 2018-03-12 RX ADMIN — NEOSTIGMINE METHYLSULFATE 4 MG: 1 INJECTION INTRAVENOUS at 09:03

## 2018-03-12 RX ADMIN — PHENYLEPHRINE HYDROCHLORIDE 100 MCG: 10 INJECTION INTRAVENOUS at 08:03

## 2018-03-12 RX ADMIN — GLYCOPYRROLATE 0.4 MG: 0.2 INJECTION, SOLUTION INTRAMUSCULAR; INTRAVENOUS at 09:03

## 2018-03-12 RX ADMIN — SODIUM CHLORIDE 1000 ML: 0.9 INJECTION, SOLUTION INTRAVENOUS at 07:03

## 2018-03-12 RX ADMIN — SODIUM CHLORIDE: 0.9 INJECTION, SOLUTION INTRAVENOUS at 10:03

## 2018-03-12 RX ADMIN — PHENYLEPHRINE HYDROCHLORIDE 200 MCG: 10 INJECTION INTRAVENOUS at 07:03

## 2018-03-12 RX ADMIN — FENTANYL CITRATE 25 MCG: 50 INJECTION, SOLUTION INTRAMUSCULAR; INTRAVENOUS at 08:03

## 2018-03-12 RX ADMIN — EPHEDRINE SULFATE 10 MG: 50 INJECTION, SOLUTION INTRAMUSCULAR; INTRAVENOUS; SUBCUTANEOUS at 08:03

## 2018-03-12 RX ADMIN — GENTAMICIN 4 MG: 10 INJECTION, SOLUTION INTRAMUSCULAR; INTRAVENOUS at 08:03

## 2018-03-12 RX ADMIN — PHENYLEPHRINE HYDROCHLORIDE 200 MCG: 10 INJECTION INTRAVENOUS at 08:03

## 2018-03-12 RX ADMIN — Medication 20 MG: at 08:03

## 2018-03-12 RX ADMIN — BUSPIRONE HYDROCHLORIDE 15 MG: 10 TABLET ORAL at 06:03

## 2018-03-12 RX ADMIN — VANCOMYCIN HYDROCHLORIDE 10 MG: 500 INJECTION, POWDER, LYOPHILIZED, FOR SOLUTION INTRAVENOUS at 08:03

## 2018-03-12 RX ADMIN — DEXAMETHASONE SODIUM PHOSPHATE 8 MG: 4 INJECTION, SOLUTION INTRAMUSCULAR; INTRAVENOUS at 08:03

## 2018-03-12 RX ADMIN — PANTOPRAZOLE SODIUM 40 MG: 40 INJECTION, POWDER, FOR SOLUTION INTRAVENOUS at 10:03

## 2018-03-12 RX ADMIN — PHENYLEPHRINE HYDROCHLORIDE 100 MCG: 10 INJECTION INTRAVENOUS at 07:03

## 2018-03-12 RX ADMIN — ONDANSETRON 4 MG: 2 INJECTION INTRAMUSCULAR; INTRAVENOUS at 09:03

## 2018-03-12 RX ADMIN — FENTANYL CITRATE 100 MCG: 50 INJECTION, SOLUTION INTRAMUSCULAR; INTRAVENOUS at 07:03

## 2018-03-12 RX ADMIN — PROPOFOL 100 MG: 10 INJECTION, EMULSION INTRAVENOUS at 07:03

## 2018-03-12 RX ADMIN — ROCURONIUM BROMIDE 30 MG: 10 INJECTION, SOLUTION INTRAVENOUS at 07:03

## 2018-03-12 RX ADMIN — ACETAMINOPHEN 1000 MG: 10 INJECTION, SOLUTION INTRAVENOUS at 08:03

## 2018-03-12 RX ADMIN — Medication 400 MG: at 08:03

## 2018-03-12 RX ADMIN — PROPOFOL 30 MG: 10 INJECTION, EMULSION INTRAVENOUS at 08:03

## 2018-03-12 RX ADMIN — FENTANYL CITRATE 50 MCG: 50 INJECTION, SOLUTION INTRAMUSCULAR; INTRAVENOUS at 08:03

## 2018-03-12 RX ADMIN — SODIUM CHLORIDE: 0.9 INJECTION, SOLUTION INTRAVENOUS at 06:03

## 2018-03-12 RX ADMIN — LIDOCAINE HYDROCHLORIDE 60 MG: 20 INJECTION, SOLUTION INTRAVENOUS at 07:03

## 2018-03-12 NOTE — PROGRESS NOTES
Collected UA at 0642 on 03/12/18, straight cath 14 Romansh, urine clear, 100cc, pt tolerated well.

## 2018-03-12 NOTE — PLAN OF CARE
PT/OT recs SNF, however, pt has limited coverage for post-acute services. Receiving  PTA. Lives with son.        03/12/18 1411   Discharge Reassessment   Assessment Type Discharge Planning Reassessment   Provided patient/caregiver education on the expected discharge date and the discharge plan Yes   Do you have any problems affording any of your prescribed medications? No   Discharge Plan A Home with family;Home Health   Discharge Plan B Skilled Nursing Facility   Can the patient answer the patient profile reliably? No, cognitively impaired   How does the patient rate their overall health at the present time? Fair   Describe the patient's ability to walk at the present time. Walks with the help of equipment   How often would a person be available to care for the patient? Infrequently   Number of comorbid conditions (as recorded on the chart) Three   During the past month, has the patient often been bothered by feeling down, depressed or hopeless? No   During the past month, has the patient often been bothered by little interest or pleasure in doing things? No

## 2018-03-12 NOTE — PROGRESS NOTES
[facility] Hospital Medicine  Progress Note    Patient Name: Lisbeth Seaman  MRN: [id number]  Patient Class: IP- Inpatient   Admission Date: 3/6/2018  Length of Stay: 6 days  Attending Physician: Ovi Cordoba MD  Primary Care Provider: Rob Valladares MD    [facility] Medicine Team: [facility] HOSP MED 1 Mika Valiente MD    Subjective:     Principal Problem:Encephalopathy, metabolic    HPI:  Lisbeth Seaman is a 63 y.o. Female with extensive PMH who has pmhx HLD, HTN, vascular dementia and stroke is presenting to [facility] for evaluation failure to thrive since discharge from hospital three days ago. Patient is also reported to have history of NPH and needs neurosurgery evaluation for treatment. Daughter reports that pt has been unable to eat or drink, decrease mobility secondary to weakness, and confusion. She also reports decreased bowel movements however this is likely 2/2 to decreased PO intake as patient does have positive bowel sounds in all 4 quadrants. Family thinks that pt needs to be hospitalized due to declining health since discharge. Family contacted PCP today, who advised pt to go to ED for further evaluation.      Patient non-verbal on physical exam, oriented only to person. Family not at bedside and unable to be reached by phone.     Hospital Course:  03/08 NSGY to evaluate today; Day 2/3 of Abx for presumed UTI, cultures pending. SLP re-evaluation for some difficulties during PO intake   03/09 Doing well. Mental status stable. Completed ceftriaxone this morning with test for clearance pending.   03/10 Awaiting UA/Ucx to confirm bacterial clearance of UTI. No issues overnight; continuing to work on adequate PO intake.   03/11 Repeat UA w/o any signs of infection. Patient stable.  shunt scheduled for Monday. NPO after midnight.  03/12 Patient received BP shunt today. Will D/c Bactrim.     Interval History:     NAEON    Review of Systems   Unable to perform ROS: Dementia   Constitutional:  Negative for fever and unexpected weight change.   HENT: Positive for dental problem.    Respiratory: Negative for cough.    Cardiovascular: Negative for chest pain.   Gastrointestinal: Negative.    Neurological: Positive for weakness.     Objective:     Vital Signs (Most Recent):  Temp: 98.7 °F (37.1 °C) (03/12/18 1200)  Pulse: 83 (03/12/18 1230)  Resp: 10 (03/12/18 1230)  BP: 121/79 (03/12/18 1230)  SpO2: 100 % (03/12/18 1230) Vital Signs (24h Range):  Temp:  [96.2 °F (35.7 °C)-98.7 °F (37.1 °C)] 98.7 °F (37.1 °C)  Pulse:  [] 83  Resp:  [10-22] 10  SpO2:  [92 %-100 %] 100 %  BP: (112-166)/() 121/79     Weight: 47.2 kg (104 lb 0.9 oz)  Body mass index is 19.66 kg/m².    Intake/Output Summary (Last 24 hours) at 03/12/18 1335  Last data filed at 03/12/18 0923   Gross per 24 hour   Intake              800 ml   Output               10 ml   Net              790 ml      Physical Exam   Constitutional: She appears well-developed. No distress.   HENT:   Head: Normocephalic and atraumatic.   Mouth/Throat: No oropharyngeal exudate.   Eyes: EOM are normal. Pupils are equal, round, and reactive to light. No scleral icterus.   Neck: Normal range of motion. Neck supple.   Cardiovascular: Normal rate, regular rhythm, normal heart sounds and intact distal pulses.    No murmur heard.  Pulmonary/Chest: Effort normal. No respiratory distress. She has no wheezes.   Abdominal: Soft. Bowel sounds are normal. She exhibits no distension.   Musculoskeletal: Normal range of motion. She exhibits no edema.   Lymphadenopathy:     She has no cervical adenopathy.   Neurological: She is alert.   Skin: Skin is warm. She is not diaphoretic.   Diffuse ecchymoses and purpura    Psychiatric:   Able to respond with some single word answers; pleasant, alert and awake     Vitals reviewed.    Significant Labs:   BMP:   Recent Labs  Lab 03/12/18  0345   GLU 82      K 4.5      CO2 27   BUN 5*   CREATININE 0.9   CALCIUM 8.9   MG 2.0      CBC:   Recent Labs  Lab 03/11/18  0522 03/12/18  0344   WBC 5.94 6.46   HGB 8.7* 8.2*   HCT 27.4* 25.8*   * 144*        Assessment/Plan:      * Encephalopathy, metabolic    - Stable  - Dx of NPH after a LP on previous admission w/ improvement based on pre- and post- LP PT evaluation. Her mental status remains improved since this intervention.  - NSGY following, appreciate assistance.  -  shunt placement on 3/12.          Debility    -likely 2/2 to previous stroke  -PT/OT following, recommending SNF, though this is unfortunately not an option for her given their financial situation. Continuing to discuss with case management        Normal pressure hydrocephalus    - Stable  - Mildly enlarged ventricles on previous scans  - Therapeutic/diagnostic tap w/ 7 cc off on previous admission did render improvement in symptoms; PT pre and post testing confirmed     - NSGY following  - Holding anticoagulation at this time  - V/P shunt placed on 3/12        Hypophosphatemia    - Following daily and replacing PRN        Hypokalemia    - Following daily and replacing PRN        Hypertension, essential    - Stable  - Will continue to monitor        Vascular dementia without behavioral disturbance    - Stable  - Continue Statin at this time  - Neurosurg following  - Neuro checks q4  - Patient's current medical state is likely 2/2 combination of cebro-vascular disease and NPH component. Her mental status was not affected by the UTI and she remains stable  - Repeat UA w/o any evidence of UTI   - Bactrim d/c          VTE Risk Mitigation         Ordered     Medium Risk of VTE  Once      03/12/18 0934     Place CHANDRAKANT hose  Until discontinued      03/12/18 0934     Place sequential compression device  Until discontinued      03/12/18 0934     Place CHANDRAKANT hose  Until discontinued      03/06/18 2340     Place sequential compression device  Until discontinued      03/06/18 2340        Mika Valiente MD  Department of Hospital  Medicine   [facility]

## 2018-03-12 NOTE — ANESTHESIA POSTPROCEDURE EVALUATION
"Anesthesia Post Evaluation    Patient: Lisbeth Seaman    Procedure(s) Performed: Procedure(s) (LRB):  SHUNT- - WITH STEALTH - RIGHT (Right)    Final Anesthesia Type: general  Patient location during evaluation: PACU  Patient participation: Yes- Able to Participate  Level of consciousness: awake and alert  Post-procedure vital signs: reviewed and stable  Pain management: adequate  Airway patency: patent  PONV status at discharge: No PONV  Anesthetic complications: no      Cardiovascular status: blood pressure returned to baseline and stable  Respiratory status: unassisted and spontaneous ventilation  Hydration status: euvolemic  Follow-up not needed.        Visit Vitals  BP (!) 138/97 (BP Location: Right arm, Patient Position: Lying)   Pulse 106   Temp 36.8 °C (98.2 °F) (Axillary)   Resp 16   Ht 5' 1" (1.549 m)   Wt 47.2 kg (104 lb 0.9 oz)   SpO2 (!) 92%   Breastfeeding? No   BMI 19.66 kg/m²       Pain/Milena Score: Pain Assessment Performed: Yes (3/12/2018 12:23 PM)  Presence of Pain: denies (3/12/2018 12:23 PM)  Milena Score: 10 (3/12/2018 12:23 PM)      "

## 2018-03-12 NOTE — PT/OT/SLP PROGRESS
Physical Therapy      Patient Name:  Lisbeth Seaman   MRN:  [id number]    Patient not seen today secondary to Other (Comment). Pt off the floor for  shunt. Will follow-up when next scheduled.    HALI SHEPPARD, PT   3/12/2018

## 2018-03-12 NOTE — PROGRESS NOTES
Sister just arrived to hospital, didn't realize surgery was this am, thought at 12 noon.  Update given, states pt doesn't like to talk but will if asked direct questions.

## 2018-03-12 NOTE — OP NOTE
DATE OF PROCEDURE: 03/12/2018     PREOPERATIVE DIAGNOSES:  1.  Normal pressure hydrocephalus     POSTOPERATIVE DIAGNOSES:  1.  Same    PROCEDURES:   1.- Ventriculo-peritoneal shunt placement (Hakim valve set up to 200).  2.- Navigation guided procedure  3.- Infusion of intrathecal antibiotics (vancomycin and gentamycin)     ANESTHESIA: General endotracheal anesthesia     BLOOD LOSS: 10cc     SURGEON: Tim Vasquez M.D.     ASSISTANT: James Rojas MD (neurosurgery resident)     INDICATIONS FOR THE PROCEDURE: Lisbeth Seaman is a 63 y.o. with history of after mental status, gait disturbances, and a diagnosis of normal pressure hydrocephalus, confirmed by several large volume lumbar punctures.  Patient was referred to me by Dr Alexander for ventriculoperitoneal shunt placement.  After explaining risks and benefits of the procedure, the patient and her son decided to proceed with surgery.       PROCEDURE IN DETAIL: After obtaining informed consents, explaining risks and   benefits of the procedure, the patient was brought down   to the Operative Theater, the patient was identified and then induced into general anesthesia. All required IV lines were obtained by anesthesia. The   patient was positioned with a towel roll under the right shoulder, and the head   was positioned on the horseshoe and extended for final positioning. At this point, neuro-navigation system calibrated and merged with the patient's anatomy and the area of the head incision needed to access the occipital lateral horn was shaved and marked. The rest of the skin over the head, neck, chest and abdomen was prepped and draped under sterile conditions.     At this point, appropriate time out was carried. The head incision was make using 15 blade scalpel and Bovie cautherie. Periosteum was left intact. The area of the bur-hole was confirmed with the navigation system and after using Bovie to dissect the periosteum, a match stick bur was used to create  the bur hole. The dura was coagulated using bipolar cautery. At this time sub-galeal dissection was carried down towards the nuchal line, to make space for the valve. The shunt passer was used to tunnel the skin from the neck down to the abdomen. A 15 blade knife was used to incise the skin over the distal passer, in the abdomen. At this point the shunt passer was pushed through this small incision and the shunt passer came through it.     At this point, the [place] reservoir was connected to the Hakim valve (previously set up to 200) and secured with 3-0 silk tides. The entire system was flushed with saline and the distal catheter was connected to the valve and secured with 3-0 silk. The shunt passer handle was removed and distal catheter tip was passed through the shunt passer from the head to the abdomen. The shunt passer was removed from the abdominal incision and the distal VPS catheter was kept wrapped within an antibiotic irrigation wet lap sponge.       At this point, the dura was opened using a 15 blade knife. The dural leaves were coagulated using bipolar. The cherri was opened using bipolars. At this point, the neuronavigation needle prove was placed through the ventricular catheter and used to acces the occipital horn of the lateral ventricle. We followed the targeted neuronavigation plan to position the catheter in the right frontal horn of the ventricle.  Once we felt happy with positioning, the ventricular catheter was cut at 10 cm, at the desired position.    At this point, the proximal catheter was connected to [place]   [place]. Copious amount of irrigation was used to irrigate the wound.   Intraoperative antibiotics gentamicin and vancomycin were injected through the   [place] reservoir.    At this point, and with the distal catheter draining spinal fluid, the distal catheter was internalized in the abdomen.  A 4 cm transverse incision under the right rib cage was assigned.  Using sharp dissection,  the abdominal fat was dissected.  Rectus abdominal muscle was identified.  Aponeurosis was cut.  Muscle fibers were split on their own direction.  Peritoneum was identified.  Parietal peritoneum was lifted out using a mosquito.  It was cut.  Omentum was identified.  A red rubber catheter was used to push the omentum down coming to shunt.  At this point, under direct visualization using bayonet forceps, the distal shunt tubing was inserted into the abdominal cavity.  Red rubber catheter was retrieved.  Parietal peritoneum was closed using a 4-0 Nurolon pursestring stitch.  Rectus muscle fibers came down to midline.  Aponeurosis was closed with 0 Vicryl.  Abdominal fat was approximated using 0 Vicryls.  Subcutaneous tissue was closed using 3-0 Vicryl's.  Skin was closed using 4-0 Monocryl subcuticular.    At this point, attention was put into head wound closure. The galea was   closed using 3-0 Vicryl. The skin was closed using a 4-0 Monocryl. All   instruments, needle counts were confirmed x3.     There were no complications during the procedure.    Dr. Baker was present during the entire procedure.

## 2018-03-12 NOTE — ASSESSMENT & PLAN NOTE
- Stable  - Dx of NPH after a LP on previous admission w/ improvement based on pre- and post- LP PT evaluation. Her mental status remains improved since this intervention.  - NSGY following, appreciate assistance.  -  shunt placement on 3/12.

## 2018-03-12 NOTE — PROGRESS NOTES
Spoke with Rita in CT. States OK to bring patient now for scans. Will transport to CT, then patient to return to room 706.

## 2018-03-12 NOTE — TRANSFER OF CARE
"Anesthesia Transfer of Care Note    Patient: Lisbeth Seaman    Procedure(s) Performed: Procedure(s) (LRB):  SHUNT- - WITH STEALTH - RIGHT (Right)    Patient location: PACU    Anesthesia Type: general    Transport from OR: Transported from OR on 6-10 L/min O2 by face mask with adequate spontaneous ventilation    Post pain: adequate analgesia    Post assessment: no apparent anesthetic complications    Post vital signs: stable    Level of consciousness: awake and alert    Complications: none    Transfer of care protocol was followed      Last vitals:   Visit Vitals  /78   Pulse 82   Temp 36.9 °C (98.5 °F)   Resp (!) 22   Ht 5' 1" (1.549 m)   Wt 47.2 kg (104 lb 0.9 oz)   SpO2 97%   Breastfeeding? No   BMI 19.66 kg/m²     "

## 2018-03-12 NOTE — ASSESSMENT & PLAN NOTE
- Stable  - Continue Statin at this time  - Neurosurg following  - Neuro checks q4  - Patient's current medical state is likely 2/2 combination of cebro-vascular disease and NPH component. Her mental status was not affected by the UTI and she remains stable  - Repeat UA w/o any evidence of UTI   - Bactrim d/c

## 2018-03-12 NOTE — PROGRESS NOTES
shunt placed today. Gauze to R side of head; moderate drainage present. Abd incision with steri strips, clean dry and intact. Q2 turns; incont dermitis. Confused but able to respond to yes and no questions. UA ordered and doesn't appear to be collected. Clear liquid diet. Did have in incont episode of urine post surgery.

## 2018-03-12 NOTE — PLAN OF CARE
Problem: Patient Care Overview  Goal: Plan of Care Review  Outcome: Ongoing (interventions implemented as appropriate)  Patient remains free from injury or fall. Poor oral intake. No neuro changes from initial assessment. CT scan ordered.Plan=possible surgery in AM. Will continue to monitor.

## 2018-03-12 NOTE — ASSESSMENT & PLAN NOTE
- Stable  - Mildly enlarged ventricles on previous scans  - Therapeutic/diagnostic tap w/ 7 cc off on previous admission did render improvement in symptoms; PT pre and post testing confirmed     - NSGY following  - Holding anticoagulation at this time  - V/P shunt placed on 3/12

## 2018-03-12 NOTE — BRIEF OP NOTE
[facility]  Brief Operative Note    SUMMARY     Surgery Date: 3/12/2018     Surgeon(s) and Role:     * James Rojas MD - Resident - Assisting     * Tim Vasquez MD - Primary        Pre-op Diagnosis:  NPH (normal pressure hydrocephalus) [G91.2]  Normal pressure hydrocephalus [G91.2]    Post-op Diagnosis:  Post-Op Diagnosis Codes:     * NPH (normal pressure hydrocephalus) [G91.2]     * Normal pressure hydrocephalus [G91.2]    Procedure(s) (LRB):  SHUNT- - WITH STEALTH - RIGHT (Right)    Anesthesia: General    Description of Procedure: Right Transoccipital Ventriculostomy for Placement of Ventriculoperotineal Shunt with Codman-Hakim Programmable Shunt Valve set at 200.     Description of the findings of the procedure: See Full op note.     Estimated Blood Loss: 10 mL         Specimens:   Specimen (12h ago through future)    None

## 2018-03-12 NOTE — SUBJECTIVE & OBJECTIVE
Interval History:     NAEON    Review of Systems   Unable to perform ROS: Dementia   Constitutional: Negative for fever and unexpected weight change.   HENT: Positive for dental problem.    Respiratory: Negative for cough.    Cardiovascular: Negative for chest pain.   Gastrointestinal: Negative.    Neurological: Positive for weakness.     Objective:     Vital Signs (Most Recent):  Temp: 98.7 °F (37.1 °C) (03/12/18 1200)  Pulse: 83 (03/12/18 1230)  Resp: 10 (03/12/18 1230)  BP: 121/79 (03/12/18 1230)  SpO2: 100 % (03/12/18 1230) Vital Signs (24h Range):  Temp:  [96.2 °F (35.7 °C)-98.7 °F (37.1 °C)] 98.7 °F (37.1 °C)  Pulse:  [] 83  Resp:  [10-22] 10  SpO2:  [92 %-100 %] 100 %  BP: (112-166)/() 121/79     Weight: 47.2 kg (104 lb 0.9 oz)  Body mass index is 19.66 kg/m².    Intake/Output Summary (Last 24 hours) at 03/12/18 1335  Last data filed at 03/12/18 0923   Gross per 24 hour   Intake              800 ml   Output               10 ml   Net              790 ml      Physical Exam   Constitutional: She appears well-developed. No distress.   HENT:   Head: Normocephalic and atraumatic.   Mouth/Throat: No oropharyngeal exudate.   Eyes: EOM are normal. Pupils are equal, round, and reactive to light. No scleral icterus.   Neck: Normal range of motion. Neck supple.   Cardiovascular: Normal rate, regular rhythm, normal heart sounds and intact distal pulses.    No murmur heard.  Pulmonary/Chest: Effort normal. No respiratory distress. She has no wheezes.   Abdominal: Soft. Bowel sounds are normal. She exhibits no distension.   Musculoskeletal: Normal range of motion. She exhibits no edema.   Lymphadenopathy:     She has no cervical adenopathy.   Neurological: She is alert.   Skin: Skin is warm. She is not diaphoretic.   Diffuse ecchymoses and purpura    Psychiatric:   Able to respond with some single word answers; pleasant, alert and awake     Vitals reviewed.    Significant Labs:   BMP:   Recent Labs  Lab  03/12/18  0345   GLU 82      K 4.5      CO2 27   BUN 5*   CREATININE 0.9   CALCIUM 8.9   MG 2.0     CBC:   Recent Labs  Lab 03/11/18  0522 03/12/18  0344   WBC 5.94 6.46   HGB 8.7* 8.2*   HCT 27.4* 25.8*   * 144*

## 2018-03-13 LAB
ALBUMIN SERPL BCP-MCNC: 2.2 G/DL
ALP SERPL-CCNC: 79 U/L
ALT SERPL W/O P-5'-P-CCNC: 15 U/L
ANION GAP SERPL CALC-SCNC: 7 MMOL/L
AST SERPL-CCNC: 26 U/L
BASOPHILS # BLD AUTO: 0.01 K/UL
BASOPHILS NFR BLD: 0.1 %
BILIRUB SERPL-MCNC: 0.4 MG/DL
BUN SERPL-MCNC: 8 MG/DL
CALCIUM SERPL-MCNC: 8.4 MG/DL
CHLORIDE SERPL-SCNC: 108 MMOL/L
CO2 SERPL-SCNC: 22 MMOL/L
CREAT SERPL-MCNC: 0.8 MG/DL
DIFFERENTIAL METHOD: ABNORMAL
EOSINOPHIL # BLD AUTO: 0 K/UL
EOSINOPHIL NFR BLD: 0 %
ERYTHROCYTE [DISTWIDTH] IN BLOOD BY AUTOMATED COUNT: 15 %
EST. GFR  (AFRICAN AMERICAN): >60 ML/MIN/1.73 M^2
EST. GFR  (NON AFRICAN AMERICAN): >60 ML/MIN/1.73 M^2
GLUCOSE SERPL-MCNC: 90 MG/DL
HCT VFR BLD AUTO: 23.7 %
HGB BLD-MCNC: 7.5 G/DL
IMM GRANULOCYTES # BLD AUTO: 0.14 K/UL
IMM GRANULOCYTES NFR BLD AUTO: 1.2 %
LYMPHOCYTES # BLD AUTO: 0.7 K/UL
LYMPHOCYTES NFR BLD: 6.4 %
MAGNESIUM SERPL-MCNC: 1.6 MG/DL
MCH RBC QN AUTO: 30.2 PG
MCHC RBC AUTO-ENTMCNC: 31.6 G/DL
MCV RBC AUTO: 96 FL
MONOCYTES # BLD AUTO: 0.6 K/UL
MONOCYTES NFR BLD: 5.2 %
NEUTROPHILS # BLD AUTO: 10.2 K/UL
NEUTROPHILS NFR BLD: 87.1 %
NRBC BLD-RTO: 0 /100 WBC
PHOSPHATE SERPL-MCNC: 2 MG/DL
PLATELET # BLD AUTO: 190 K/UL
PMV BLD AUTO: 10.3 FL
POTASSIUM SERPL-SCNC: 4.4 MMOL/L
PROT SERPL-MCNC: 5.4 G/DL
RBC # BLD AUTO: 2.48 M/UL
SODIUM SERPL-SCNC: 137 MMOL/L
WBC # BLD AUTO: 11.64 K/UL

## 2018-03-13 PROCEDURE — 80053 COMPREHEN METABOLIC PANEL: CPT

## 2018-03-13 PROCEDURE — 97112 NEUROMUSCULAR REEDUCATION: CPT

## 2018-03-13 PROCEDURE — 25000003 PHARM REV CODE 250: Performed by: STUDENT IN AN ORGANIZED HEALTH CARE EDUCATION/TRAINING PROGRAM

## 2018-03-13 PROCEDURE — 20600001 HC STEP DOWN PRIVATE ROOM

## 2018-03-13 PROCEDURE — 36415 COLL VENOUS BLD VENIPUNCTURE: CPT

## 2018-03-13 PROCEDURE — 62252 CSF SHUNT REPROGRAM: CPT | Mod: 26,,, | Performed by: PHYSICIAN ASSISTANT

## 2018-03-13 PROCEDURE — 99900035 HC TECH TIME PER 15 MIN (STAT)

## 2018-03-13 PROCEDURE — 97530 THERAPEUTIC ACTIVITIES: CPT

## 2018-03-13 PROCEDURE — 85025 COMPLETE CBC W/AUTO DIFF WBC: CPT

## 2018-03-13 PROCEDURE — 25000003 PHARM REV CODE 250: Performed by: INTERNAL MEDICINE

## 2018-03-13 PROCEDURE — 84100 ASSAY OF PHOSPHORUS: CPT

## 2018-03-13 PROCEDURE — 97803 MED NUTRITION INDIV SUBSEQ: CPT

## 2018-03-13 PROCEDURE — 83735 ASSAY OF MAGNESIUM: CPT

## 2018-03-13 PROCEDURE — 99232 SBSQ HOSP IP/OBS MODERATE 35: CPT | Mod: ,,, | Performed by: HOSPITALIST

## 2018-03-13 RX ADMIN — Medication 400 MG: at 11:03

## 2018-03-13 RX ADMIN — Medication 400 MG: at 09:03

## 2018-03-13 RX ADMIN — ATORVASTATIN CALCIUM 40 MG: 20 TABLET, FILM COATED ORAL at 11:03

## 2018-03-13 RX ADMIN — HYDROCODONE BITARTRATE AND ACETAMINOPHEN 1 TABLET: 5; 325 TABLET ORAL at 09:03

## 2018-03-13 RX ADMIN — PANTOPRAZOLE SODIUM 40 MG: 40 TABLET, DELAYED RELEASE ORAL at 11:03

## 2018-03-13 NOTE — ASSESSMENT & PLAN NOTE
64 yo F with increased confusion and failure to thrive with known NPH, s/p   Shunt placement POD 1  - Pt is neurologically stable, improving cognition s/p shunt   - Abdominal x-ray shows the distal end of the shunt catheter tightly coiled anteriorly in mid abdomen.  Keep upright in chair as much as tolerated today.  Will repeat abdominal x-ray tomorrow for further evaluate distal catheter.    - Shunt valve reprogrammed to 200 mm H2O, will recheck skull xray tomorrow   - Continue current medical management per primary team  - Pt with h/o thrombocytopenia,190 today will monitor.  - Will continue to follow, please call with questions   - Discussed with Dr. Baker

## 2018-03-13 NOTE — PROGRESS NOTES
" [facility]  Adult Nutrition  Progress Note    SUMMARY       Recommendations    Once medically able, ADAT to Regular (texture per SLP) + Boost Plus with meals.     RD following     Goals: Reach % EEN/EPN from meals/ ONS  Nutrition Goal Status: new  Communication of RD Recs:  (POC)    Reason for Assessment    Reason for Assessment: RD follow-up  Diagnosis: other (see comments) (Encephalopathy, metabolic)  Relevant Medical History: Stroke, HTN, HLD    General Information Comments:  shunt placed yesterday. Pt not in room during attempted visit. On clear liquid diet- will monitor advancement.     Nutrition Discharge Planning: PO intake >85% EEN,EPN    Nutrition Risk Screen    Nutrition Risk Screen: dysphagia or difficulty swallowing    Nutrition/Diet History    Patient Reported Diet/Restrictions/Preferences: other (see comments) (patricia)  Food Preferences: No cultural or Orthodoxy preferences.  Do you have any cultural, spiritual, Orthodoxy conflicts, given your current situation?: no conflicts  Factors Affecting Nutritional Intake: decreased appetite, impaired cognitive status/motor control, difficulty/impaired swallowing    Anthropometrics    Temp: 96.8 °F (36 °C)  Height Method: Measured  Height: 5' 1" (154.9 cm)  Height (inches): 61 in  Weight Method: Standard Scale  Weight: 47.2 kg (104 lb 0.9 oz)  Weight (lb): 104.06 lb  Ideal Body Weight (IBW), Female: 105 lb  % Ideal Body Weight, Female (lb): 99.1 lb  BMI (Calculated): 19.7  BMI Grade: 18.5-24.9 - normal    Lab/Procedures/Meds    Pertinent Labs Reviewed: reviewed  Pertinent Labs Comments: Phos 2.0, Ca 8.4, Alb 2.2  Pertinent Medications Reviewed: reviewed  Pertinent Medications Comments: IVF, panto, Mg ox    Physical Findings/Assessment    Overall Physical Appearance:  (unable to assess)  Oral/Mouth Cavity: WDL  Skin: incision(s), pressure ulcer(s), dermatitis    Estimated/Assessed Needs    Weight Used For Calorie Calculations: 47.2 kg " "(104 lb 0.9 oz)  Height: 5' 1" (154.9 cm)  Energy Calorie Requirements (kcal): 1267  Energy Need Method: Kcal/kg (1.3 PAL)  RMR ([place]-St. Jeor Equation): 974.38  Protein Requirements: 57-66g/d (1.2-1.4 g/kg)  Weight Used For Protein Calculations: 47.2 kg (104 lb 0.9 oz)  Fluid Need Method: RDA Method (1 ml/kcal or per MD)  RDA Method (mL): 1267     Nutrition Prescription Ordered    Current Diet Order: Clear Liquid   Oral Nutrition Supplement: Boost Plus with meals     Evaluation of Received Nutrient/Fluid Intake    IV Fluid (mL): 2400 (Nacl @ 100)  Energy Calories Required: not meeting needs  Protein Required: not meeting needs  Fluid Required: other (see comments) (Per Md)  Comments: LBM 3/7    % Intake of Estimated Energy Needs: 25-50 %  % Meal Intake: 25 - 50 %    Nutrition Risk    Level of Risk/Frequency of Follow-up:  (f/u 1x week)     Assessment and Plan    * Encephalopathy, metabolic    Nutrition Problem:  Inadequate oral intake    Related to (etiology):   Inability to consume sufficient needs    Signs and Symptoms (as evidenced by):   Pt on clear liquid diet     Nutrition Diagnosis Status:   New               Monitor and Evaluation    Food and Nutrient Intake: energy intake, food and beverage intake  Food and Nutrient Adminstration: diet order  Knowledge/Beliefs/Attitudes: beliefs and attitudes, food and nutrition knowledge/skill  Physical Activity and Function: nutrition-related ADLs and IADLs  Anthropometric Measurements: height/length, weight, weight change, body mass index  Biochemical Data, Medical Tests and Procedures: electrolyte and renal panel, gastrointestinal profile, glucose/endocrine profile, inflammatory profile, lipid profile  Nutrition-Focused Physical Findings: overall appearance     Nutrition Follow-Up    RD Follow-up?: Yes        "

## 2018-03-13 NOTE — PLAN OF CARE
Problem: Patient Care Overview  Goal: Plan of Care Review  Recommendations    Once medically able, ADAT to Regular (texture per SLP) + Boost Plus with meals.     RD following     Goals: Reach % EEN/EPN from meals/ ONS  Nutrition Goal Status: new

## 2018-03-13 NOTE — SUBJECTIVE & OBJECTIVE
Interval History:  NAEON.  Pt and family report improvement in cognition s/p shunt.  Still requiring assistance with transfers during PT.  Denies N/V, visual changes, new weakness, or seizures.        Medications:  Continuous Infusions:   lactated ringers Stopped (03/08/18 1130)     Scheduled Meds:   atorvastatin  40 mg Oral Daily    magnesium oxide  400 mg Oral BID    pantoprazole  40 mg Oral Daily     PRN Meds:acetaminophen, acetaminophen, acetaminophen, dextrose 50%, dextrose 50%, glucagon (human recombinant), glucose, glucose, hydrocodone-acetaminophen 5-325mg, ondansetron, ramelteon, sodium chloride 0.9%, sodium chloride 0.9%     Review of Systems  Objective:     Weight: 47.2 kg (104 lb 0.9 oz)  Body mass index is 19.66 kg/m².  Vital Signs (Most Recent):  Temp: 97.2 °F (36.2 °C) (03/13/18 1049)  Pulse: (!) 113 (03/13/18 1049)  Resp: 18 (03/13/18 1049)  BP: 127/73 (03/13/18 1049)  SpO2: 97 % (03/12/18 1930) Vital Signs (24h Range):  Temp:  [96.8 °F (36 °C)-98.2 °F (36.8 °C)] 97.2 °F (36.2 °C)  Pulse:  [100-113] 113  Resp:  [16-18] 18  SpO2:  [92 %-97 %] 97 %  BP: (127-147)/(73-97) 127/73                        Neurosurgery Physical Exam   Neurologic: Awake, Alert. Oriented to person only.  Head: normocephalic  Baseline Dementia   GCS: Motor: 6/Verbal: 4/Eyes: 4 GCS Total: 14  Cranial nerves: face symmetric, tongue midline, pupils equal, round, reactive to light with accomodation, extraocular muscles intact  Sensory: response to light touch throughout  Motor Strength: generalized weakness   No focal numbness or weakness  Lungs:  normal respiratory effort  Abdomen: soft  Extremities: no cyanosis or edema, or clubbing      Significant Labs:    Recent Labs  Lab 03/12/18  0345 03/13/18  0459   GLU 82 90    137   K 4.5 4.4    108   CO2 27 22*   BUN 5* 8   CREATININE 0.9 0.8   CALCIUM 8.9 8.4*   MG 2.0 1.6       Recent Labs  Lab 03/12/18  0344 03/13/18  0500   WBC 6.46 11.64   HGB 8.2* 7.5*   HCT 25.8*  23.7*   * 190     No results for input(s): LABPT, INR, APTT in the last 48 hours.  Microbiology Results (last 7 days)     Procedure Component Value Units Date/Time    Urine culture [[id number]] Collected:  03/10/18 1135    Order Status:  Completed Specimen:  Urine from Urine, Catheterized Updated:  03/11/18 2023     Urine Culture, Routine No growth    Urine culture [[id number]]     Order Status:  Canceled Specimen:  Urine

## 2018-03-13 NOTE — PROGRESS NOTES
[facility]  Neurosurgery  Progress Note    Subjective:     History of Present Illness: Patient is 62 yo F h/o vascular dementia, stroke 9/2017, HTN, HLD, and NPH. She presents for evaluation of failure to thrive since discharge from hospital three days ago.  Per family functional status has been steadily declining since stroke in 9/2017 including not being able to ambulate on her own or dress, feed, or bath herself. Pt was hospitalized stay from 2/25/2018-3/3/2018,  Pt was evaluated by neurosurgery for NPH,  she had a high volume LP with   improvement of gait and cognitive symptoms, and was scheduled for  shunt placement back home in [place].  Since being home, she has been working with home health, her son reports one day after discharge stay she began to show increase in generalized weakness and confusion.  Pt was found to have UTI and currently admitted under medicine service.  Neurosurgery consulted for evaluation of NPH/possible  shunt placement.  Patient is unable to provide reliable history. Son at bedside provided history.           Post-Op Info:  Procedure(s) (LRB):   SHUNT W/ NAVIGATION (Right)   1 Day Post-Op     Interval History:  NAEON.  Pt and family report improvement in cognition s/p shunt.  Still requiring assistance with transfers during PT.  Denies N/V, visual changes, new weakness, or seizures.        Medications:  Continuous Infusions:   lactated ringers Stopped (03/08/18 1130)     Scheduled Meds:   atorvastatin  40 mg Oral Daily    magnesium oxide  400 mg Oral BID    pantoprazole  40 mg Oral Daily     PRN Meds:acetaminophen, acetaminophen, acetaminophen, dextrose 50%, dextrose 50%, glucagon (human recombinant), glucose, glucose, hydrocodone-acetaminophen 5-325mg, ondansetron, ramelteon, sodium chloride 0.9%, sodium chloride 0.9%     Review of Systems  Objective:     Weight: 47.2 kg (104 lb 0.9 oz)  Body mass index is 19.66 kg/m².  Vital Signs (Most Recent):  Temp: 97.2  °F (36.2 °C) (03/13/18 1049)  Pulse: (!) 113 (03/13/18 1049)  Resp: 18 (03/13/18 1049)  BP: 127/73 (03/13/18 1049)  SpO2: 97 % (03/12/18 1930) Vital Signs (24h Range):  Temp:  [96.8 °F (36 °C)-98.2 °F (36.8 °C)] 97.2 °F (36.2 °C)  Pulse:  [100-113] 113  Resp:  [16-18] 18  SpO2:  [92 %-97 %] 97 %  BP: (127-147)/(73-97) 127/73                        Neurosurgery Physical Exam   Neurologic: Awake, Alert. Oriented to person only.  Head: normocephalic  Baseline Dementia   GCS: Motor: 6/Verbal: 4/Eyes: 4 GCS Total: 14  Cranial nerves: face symmetric, tongue midline, pupils equal, round, reactive to light with accomodation, extraocular muscles intact  Sensory: response to light touch throughout  Motor Strength: generalized weakness   No focal numbness or weakness  Lungs:  normal respiratory effort  Abdomen: soft  Extremities: no cyanosis or edema, or clubbing      Significant Labs:    Recent Labs  Lab 03/12/18  0345 03/13/18  0459   GLU 82 90    137   K 4.5 4.4    108   CO2 27 22*   BUN 5* 8   CREATININE 0.9 0.8   CALCIUM 8.9 8.4*   MG 2.0 1.6       Recent Labs  Lab 03/12/18  0344 03/13/18  0500   WBC 6.46 11.64   HGB 8.2* 7.5*   HCT 25.8* 23.7*   * 190     No results for input(s): LABPT, INR, APTT in the last 48 hours.  Microbiology Results (last 7 days)     Procedure Component Value Units Date/Time    Urine culture [[id number]] Collected:  03/10/18 1135    Order Status:  Completed Specimen:  Urine from Urine, Catheterized Updated:  03/11/18 2023     Urine Culture, Routine No growth    Urine culture [[id number]]     Order Status:  Canceled Specimen:  Urine             Assessment/Plan:     Normal pressure hydrocephalus    62 yo F with increased confusion and failure to thrive with known NPH, s/p   Shunt placement POD 1  - Pt is neurologically stable, improving cognition s/p shunt   - Abdominal x-ray shows the distal end of the shunt catheter tightly coiled anteriorly in mid abdomen.  Keep upright in  chair as much as tolerated today.  Will repeat abdominal x-ray tomorrow for further evaluate distal catheter.    - Shunt valve reprogrammed to 200 mm H2O, will recheck skull xray tomorrow   - Continue current medical management per primary team  - Pt with h/o thrombocytopenia,190 today will monitor.  - Will continue to follow, please call with questions   - Discussed with Dr. Olivia Lee PA  Neurosurgery  [facility]

## 2018-03-13 NOTE — PLAN OF CARE
Problem: Physical Therapy Goal  Goal: Physical Therapy Goal  Goals to be met by: 2018     Patient will increase functional independence with mobility by performin. Supine to sit with Moderate Assistance  2. Sit to supine with Moderate Assistance  3. Sit to stand transfer with Maximum Assistance  4. Bed to chair transfer with Maximum Assistance  5. Lower extremity exercise program x15 reps per handout, with assistance as needed    Outcome: Ongoing (interventions implemented as appropriate)  Pt goals re-assessed.     HALI SHEPPARD, PT  3/13/2018

## 2018-03-13 NOTE — PROGRESS NOTES
[facility] Hospital Medicine  Progress Note    Patient Name: Lisbeth Seaman  MRN: [id number]  Patient Class: IP- Inpatient   Admission Date: 3/6/2018  Length of Stay: 7 days  Attending Physician: Kanika Aaron MD  Primary Care Provider: Rob Valladares MD    [facility] Medicine Team: [facility] MED 1 Mika Valiente MD    Subjective:     Principal Problem:Encephalopathy, metabolic    HPI:  Lisbeth Seaman is a 63 y.o. Female with extensive PMH who has pmhx HLD, HTN, vascular dementia and stroke is presenting to [facility] for evaluation failure to thrive since discharge from hospital three days ago. Patient is also reported to have history of NPH and needs neurosurgery evaluation for treatment. Daughter reports that pt has been unable to eat or drink, decrease mobility secondary to weakness, and confusion. She also reports decreased bowel movements however this is likely 2/2 to decreased PO intake as patient does have positive bowel sounds in all 4 quadrants. Family thinks that pt needs to be hospitalized due to declining health since discharge. Family contacted PCP today, who advised pt to go to ED for further evaluation.      Patient non-verbal on physical exam, oriented only to person. Family not at bedside and unable to be reached by phone.     Hospital Course:  03/08 NSGY to evaluate today; Day 2/3 of Abx for presumed UTI, cultures pending. SLP re-evaluation for some difficulties during PO intake   03/09 Doing well. Mental status stable. Completed ceftriaxone this morning with test for clearance pending.   03/10 Awaiting UA/Ucx to confirm bacterial clearance of UTI. No issues overnight; continuing to work on adequate PO intake.   03/11 Repeat UA w/o any signs of infection. Patient stable.  shunt scheduled for Monday. NPO after midnight.  03/12 Patient received BP shunt today. Will D/c Bactrim.   03/13 Patient doing well with no complaints. Her V/P shunt was kinked this morning and settings were  adjusted by neurosurgery. Will monitor and obtain abdominal x-ray tomorrow. Patient encouraged PO diet.     Interval History:     NAEON.     Review of Systems   Unable to perform ROS: Dementia   Constitutional: Negative for fever and unexpected weight change.   HENT: Positive for dental problem.    Respiratory: Negative for cough.    Cardiovascular: Negative for chest pain.   Gastrointestinal: Negative.    Neurological: Positive for weakness.     Objective:     Vital Signs (Most Recent):  Temp: 97.2 °F (36.2 °C) (03/13/18 1049)  Pulse: (!) 113 (03/13/18 1049)  Resp: 18 (03/13/18 1049)  BP: 127/73 (03/13/18 1049)  SpO2: 97 % (03/12/18 1930) Vital Signs (24h Range):  Temp:  [96.8 °F (36 °C)-98.2 °F (36.8 °C)] 97.2 °F (36.2 °C)  Pulse:  [100-113] 113  Resp:  [16-18] 18  SpO2:  [92 %-97 %] 97 %  BP: (127-147)/(73-97) 127/73     Weight: 47.2 kg (104 lb 0.9 oz)  Body mass index is 19.66 kg/m².  No intake or output data in the 24 hours ending 03/13/18 1423   Physical Exam   Constitutional: She appears well-developed. No distress.   HENT:   Head: Normocephalic and atraumatic.   Mouth/Throat: No oropharyngeal exudate.   Eyes: EOM are normal. Pupils are equal, round, and reactive to light. No scleral icterus.   Neck: Normal range of motion. Neck supple.   Cardiovascular: Normal rate, regular rhythm, normal heart sounds and intact distal pulses.    No murmur heard.  Pulmonary/Chest: Effort normal. No respiratory distress. She has no wheezes.   Abdominal: Soft. Bowel sounds are normal. She exhibits no distension.   Musculoskeletal: Normal range of motion. She exhibits no edema.   Lymphadenopathy:     She has no cervical adenopathy.   Neurological: She is alert.   Skin: Skin is warm. She is not diaphoretic.   Diffuse ecchymoses and purpura    Psychiatric:   Able to respond with some single word answers; pleasant, alert and awake     Vitals reviewed.    Significant Labs:   BMP:   Recent Labs  Lab 03/13/18  0459   GLU 90       K 4.4      CO2 22*   BUN 8   CREATININE 0.8   CALCIUM 8.4*   MG 1.6     CBC:   Recent Labs  Lab 03/12/18  0344 03/13/18  0500   WBC 6.46 11.64   HGB 8.2* 7.5*   HCT 25.8* 23.7*   * 190     Assessment/Plan:      * Encephalopathy, metabolic    - Stable  - Dx of NPH after a LP on previous admission w/ improvement based on pre- and post- LP PT evaluation. Her mental status remains improved since this intervention.  - NSGY following, appreciate assistance.  -  shunt placement on 3/12.          Debility    -likely 2/2 to previous stroke  -PT/OT following, recommending SNF, though this is unfortunately not an option for her given their financial situation. Continuing to discuss with case management        Normal pressure hydrocephalus    - Stable  - Mildly enlarged ventricles on previous scans  - Therapeutic/diagnostic tap w/ 7 cc off on previous admission did render improvement in symptoms; PT pre and post testing confirmed     - NSGY following  - Holding anticoagulation at this time  - V/P shunt placed on 3/12  - Kinking of the shunt this AM. Per Neurosurgery, patient will require to sit upright and will reevaluate tomorrow after reconfiguration of her shunt settings.         Hypophosphatemia    - Following daily and replacing PRN        Hypokalemia    - Following daily and replacing PRN        Hypertension, essential    - Stable  - Will continue to monitor        Vascular dementia without behavioral disturbance    - Stable  - Continue Statin at this time  - Neurosurg following  - Neuro checks q4  - Patient's current medical state is likely 2/2 combination of cebro-vascular disease and NPH component. Her mental status was not affected by the UTI and she remains stable  - Repeat UA w/o any evidence of UTI   - Bactrim d/c          VTE Risk Mitigation         Ordered     Medium Risk of VTE  Once      03/12/18 0934     Place CHANDRAKANT hose  Until discontinued      03/12/18 0934     Place sequential compression  device  Until discontinued      03/12/18 0934     Place CHANDRAKANT hose  Until discontinued      03/06/18 2340     Place sequential compression device  Until discontinued      03/06/18 2340        Mika Valiente MD  Department of Hospital Medicine   [facility]

## 2018-03-13 NOTE — PT/OT/SLP PROGRESS
Occupational Therapy   Treatment    Name: Lisbeth Seaman  MRN: [id number]  Admitting Diagnosis:  Encephalopathy, metabolic  1 Day Post-Op    Recommendations:     Discharge Recommendations: nursing facility, skilled  Discharge Equipment Recommendations:  wheelchair, bedside commode  Barriers to discharge:   (increased level of assistance required)    Subjective     Communicated with: RN prior to session.  Pain/Comfort:  · Pain Rating 1: 0/10    Objective:     Patient found with: telemetry, bed alarm, peripheral IV    General Precautions: Standard, aspiration, fall   Orthopedic Precautions:N/A   Braces: N/A     Occupational Performance:    Bed Mobility:    · Patient completed Scooting/Bridging with total assistance  · Patient completed Supine to Sit with maximal assistance     Functional Mobility/Transfers:  · Patient completed Bed <> Wheelchair Transfer using Squat Pivot technique with total assistance with 2 persons; w/c arm removed; required 3 lateral scoots to complete squat pivot t/f with readjustment of BLE into knee flexion between each scoot    Patient left up in wheelchair with all lines intact, call button in reach, RN notified and RN present    [facility] 6 Click:  [facility] Total Score: 13    Treatment & Education:  OT performed BUE PROM stretching in supine to facilitate max ROM in shoulder flexion/abduction/ER and elbow flexion/extension; facilitated PROM trunk rotations bilaterally in supine with knees bent to stretch trunk/postural musculature in preparation for sitting EOB.   Pt set EOB ~15 min with SBA/CGA for static sitting balance. Pt demonstrated 2/5 on the KUSSBS for sitting balanceEducation:   with no LOB noted with BUE supported on bed.  Facilitated B knee flexion, dorsiflexion, and hip ER to position feet flat on floor; facilitated anterior weight shift to WB through BLE while seated EOB (couch cushion placed under feet).  Pt performed squat pivot t/f to w/c with total A as described above; pt  postitioned in w/c with both feet on footrests (R ankle coban-wrapped to footrest to prevent sliding off) and blanket placed between knees to prevent bilateral IR of hips.  RN and PT notified of pt in w/c. Pt sat up in w/c ~2 hours before PT assisted back to bed.    Assessment:     Lisbeth Seaman is a 63 y.o. female with a medical diagnosis of Encephalopathy, metabolic.  She presents with increased tone in BUE/BLE which limits pt's ability to assist with functional transfers. Pt did not attempt verbal communication this session, but was agreeable to participating in session. Pt requires total assistance to transfer to w/c. Pt will require use of w/c and BSC if d/c to home. Performance deficits affecting function are weakness, impaired endurance, impaired self care skills, impaired functional mobilty, impaired cognition, impaired balance, decreased upper extremity function, decreased lower extremity function, abnormal tone, decreased safety awareness, decreased ROM, impaired fine motor.      Rehab Prognosis:  fair; patient would benefit from acute skilled OT services to address these deficits and reach maximum level of function.       Plan:     Patient to be seen 4 x/week to address the above listed problems via self-care/home management, therapeutic activities, therapeutic exercises, neuromuscular re-education, cognitive retraining  · Plan of Care Expires: 04/12/18  · Plan of Care Reviewed with: patient    This Plan of care has been discussed with the patient who was involved in its development and understands and is in agreement with the identified goals and treatment plan    GOALS:    Occupational Therapy Goals        Problem: Occupational Therapy Goal    Goal Priority Disciplines Outcome Interventions   Occupational Therapy Goal     OT, PT/OT Revised    Description:  Goals to be met by: 3/20/17    Patient will increase functional independence with ADLs by performing:    Feeding with Set-up Assistance.  UE  Dressing with Minimum Assistance.  Grooming while seated EOB with Set-up assistance and verbal cues for sequencing of task.  Toileting from bedside commode with Moderate Assistance for hygiene and clothing management.   Supine to sit with Moderate Assistance.  Squat Pivot transfer EOB->bedside chair/wheelchair with Max Assistance.  Toilet transfer Squat Pivot to bedside commode with Max Assistance.                       Time Tracking:     OT Date of Treatment: 03/13/18  OT Start Time: 1024  OT Stop Time: 1108  OT Total Time (min): 44 min    Billable Minutes:Therapeutic Activity 36  Neuromuscular Re-education 8    RODDY Rand  3/13/2018

## 2018-03-13 NOTE — SUBJECTIVE & OBJECTIVE
Interval History:     NAEON.     Review of Systems   Unable to perform ROS: Dementia   Constitutional: Negative for fever and unexpected weight change.   HENT: Positive for dental problem.    Respiratory: Negative for cough.    Cardiovascular: Negative for chest pain.   Gastrointestinal: Negative.    Neurological: Positive for weakness.     Objective:     Vital Signs (Most Recent):  Temp: 97.2 °F (36.2 °C) (03/13/18 1049)  Pulse: (!) 113 (03/13/18 1049)  Resp: 18 (03/13/18 1049)  BP: 127/73 (03/13/18 1049)  SpO2: 97 % (03/12/18 1930) Vital Signs (24h Range):  Temp:  [96.8 °F (36 °C)-98.2 °F (36.8 °C)] 97.2 °F (36.2 °C)  Pulse:  [100-113] 113  Resp:  [16-18] 18  SpO2:  [92 %-97 %] 97 %  BP: (127-147)/(73-97) 127/73     Weight: 47.2 kg (104 lb 0.9 oz)  Body mass index is 19.66 kg/m².  No intake or output data in the 24 hours ending 03/13/18 1423   Physical Exam   Constitutional: She appears well-developed. No distress.   HENT:   Head: Normocephalic and atraumatic.   Mouth/Throat: No oropharyngeal exudate.   Eyes: EOM are normal. Pupils are equal, round, and reactive to light. No scleral icterus.   Neck: Normal range of motion. Neck supple.   Cardiovascular: Normal rate, regular rhythm, normal heart sounds and intact distal pulses.    No murmur heard.  Pulmonary/Chest: Effort normal. No respiratory distress. She has no wheezes.   Abdominal: Soft. Bowel sounds are normal. She exhibits no distension.   Musculoskeletal: Normal range of motion. She exhibits no edema.   Lymphadenopathy:     She has no cervical adenopathy.   Neurological: She is alert.   Skin: Skin is warm. She is not diaphoretic.   Diffuse ecchymoses and purpura    Psychiatric:   Able to respond with some single word answers; pleasant, alert and awake     Vitals reviewed.    Significant Labs:   BMP:   Recent Labs  Lab 03/13/18  0459   GLU 90      K 4.4      CO2 22*   BUN 8   CREATININE 0.8   CALCIUM 8.4*   MG 1.6     CBC:   Recent Labs  Lab  03/12/18  0344 03/13/18  0500   WBC 6.46 11.64   HGB 8.2* 7.5*   HCT 25.8* 23.7*   * 190

## 2018-03-13 NOTE — ASSESSMENT & PLAN NOTE
Nutrition Problem:  Inadequate oral intake    Related to (etiology):   Inability to consume sufficient needs    Signs and Symptoms (as evidenced by):   Pt on clear liquid diet     Nutrition Diagnosis Status:   New

## 2018-03-13 NOTE — PLAN OF CARE
Problem: Occupational Therapy Goal  Goal: Occupational Therapy Goal  Goals to be met by: 3/20/17    Patient will increase functional independence with ADLs by performing:    Feeding with Set-up Assistance.  UE Dressing with Minimum Assistance.  Grooming while seated EOB with Set-up assistance and verbal cues for sequencing of task.  Toileting from bedside commode with Moderate Assistance for hygiene and clothing management.   Supine to sit with Moderate Assistance.  Squat Pivot transfer EOB->bedside chair/wheelchair with Max Assistance.  Toilet transfer Squat Pivot to bedside commode with Max Assistance.     Outcome: Revised  Goals updated based on pt current level of functional performance.  RODDY Rand  3/13/2018

## 2018-03-13 NOTE — ASSESSMENT & PLAN NOTE
- Stable  - Mildly enlarged ventricles on previous scans  - Therapeutic/diagnostic tap w/ 7 cc off on previous admission did render improvement in symptoms; PT pre and post testing confirmed     - NSGY following  - Holding anticoagulation at this time  - V/P shunt placed on 3/12  - Kinking of the shunt this AM. Per Neurosurgery, patient will require to sit upright and will reevaluate tomorrow after reconfiguration of her shunt settings.

## 2018-03-13 NOTE — SUBJECTIVE & OBJECTIVE
Interval History:  NAEON.  Continued gradual improvement in cognition.  Denies N/V, visual changes, weakness, or seizures.        Medications:  Continuous Infusions:   lactated ringers Stopped (03/08/18 1130)     Scheduled Meds:   atorvastatin  40 mg Oral Daily    magnesium oxide  400 mg Oral BID    pantoprazole  40 mg Oral Daily     PRN Meds:acetaminophen, acetaminophen, acetaminophen, dextrose 50%, dextrose 50%, glucagon (human recombinant), glucose, glucose, hydrocodone-acetaminophen 5-325mg, ondansetron, ramelteon, sodium chloride 0.9%, sodium chloride 0.9%     Review of Systems  Objective:     Weight: 47.2 kg (104 lb 0.9 oz)  Body mass index is 19.66 kg/m².  Vital Signs (Most Recent):  Temp: 97.2 °F (36.2 °C) (03/13/18 1049)  Pulse: (!) 113 (03/13/18 1049)  Resp: 18 (03/13/18 1049)  BP: 127/73 (03/13/18 1049)  SpO2: 98 % (03/13/18 0730) Vital Signs (24h Range):  Temp:  [96.8 °F (36 °C)-97.8 °F (36.6 °C)] 97.2 °F (36.2 °C)  Pulse:  [] 113  Resp:  [18] 18  SpO2:  [97 %-98 %] 98 %  BP: (127-147)/(73-94) 127/73                           Neurosurgery Physical Exam   Neurologic: Awake, Alert. Oriented to person and hospital  Head: normocephalic  Baseline Dementia   GCS: Motor: 6/Verbal: 4/Eyes: 4 GCS Total: 14  Cranial nerves: face symmetric, tongue midline, pupils equal, round, reactive to light with accomodation, extraocular muscles intact  Sensory: response to light touch throughout  Motor Strength: generalized weakness   No focal numbness or weakness  Lungs:  normal respiratory effort  Abdomen: soft  Extremities: no cyanosis or edema, or clubbing  Surgical incision is c/d/i    Significant Labs:    Recent Labs  Lab 03/12/18  0345 03/13/18  0459   GLU 82 90    137   K 4.5 4.4    108   CO2 27 22*   BUN 5* 8   CREATININE 0.9 0.8   CALCIUM 8.9 8.4*   MG 2.0 1.6       Recent Labs  Lab 03/12/18  0344 03/13/18  0500   WBC 6.46 11.64   HGB 8.2* 7.5*   HCT 25.8* 23.7*   * 190     No results for  input(s): LABPT, INR, APTT in the last 48 hours.  Microbiology Results (last 7 days)     Procedure Component Value Units Date/Time    Urine culture [[id number]] Collected:  03/10/18 1135    Order Status:  Completed Specimen:  Urine from Urine, Catheterized Updated:  03/11/18 2023     Urine Culture, Routine No growth    Urine culture [[id number]]     Order Status:  Canceled Specimen:  Urine

## 2018-03-13 NOTE — PT/OT/SLP PROGRESS
Physical Therapy Treatment    Patient Name:  Lisbeth Seaman   MRN:  [id number]    Recommendations:     Discharge Recommendations:  nursing facility, skilled   Discharge Equipment Recommendations: hospital bed   Barriers to discharge: Decreased caregiver support    Assessment:     Lisbeth Seaman is a 63 y.o. female admitted with a medical diagnosis of Encephalopathy, metabolic.  She presents with the following impairments/functional limitations:  weakness, impaired balance, decreased coordination, decreased safety awareness, decreased ROM, impaired coordination, decreased lower extremity function, decreased upper extremity function, impaired functional mobilty, impaired endurance, impaired sensation, gait instability, impaired cognition, abnormal tone, impaired joint extensibility. Pt performed bed mobility max and transfers total A (assist x2). Pt will continue to benefit from skilled PT to improve deficits and increase overall functional mobility. Pt will require 24hr assist upon d/c.    Rehab Prognosis:  Fair; patient would benefit from acute skilled PT services to address these deficits and reach maximum level of function.      Recent Surgery: Procedure(s) (LRB):   SHUNT W/ NAVIGATION (Right) 1 Day Post-Op    Plan:     During this hospitalization, patient to be seen 4 x/week to address the above listed problems via gait training, therapeutic activities, therapeutic exercises, neuromuscular re-education, wheelchair management/training  · Plan of Care Expires:  04/05/18   Plan of Care Reviewed with: patient, son    Subjective     Communicated with RN prior to session.  Patient found seated in w/c upon PT entry to room, agreeable to treatment.      Chief Complaint: NA  Pain/Comfort:  · Pain Rating 1: 0/10  · Pain Rating Post-Intervention 1: 0/10    Patients cultural, spiritual, Jewish conflicts given the current situation: no conflicts    Objective:     Patient found with: peripheral IV     General  Precautions: Standard, aspiration, fall   Orthopedic Precautions:N/A   Braces: N/A     Functional Mobility:  Bed Mobility:     · Sit to Supine: maximal assistance    Transfers:     · W/c to EOB: total assistance and of 2 persons with  no AD  using  Squat Pivot      AM-PAC 6 CLICK MOBILITY  Turning over in bed (including adjusting bedclothes, sheets and blankets)?: 2  Sitting down on and standing up from a chair with arms (e.g., wheelchair, bedside commode, etc.): 1  Moving from lying on back to sitting on the side of the bed?: 1  Moving to and from a bed to a chair (including a wheelchair)?: 1  Need to walk in hospital room?: 1  Climbing 3-5 steps with a railing?: 1  Total Score: 7       Therapeutic Activities and Exercises:  PT applied static stretch and tone inhibition to B knee flex and ankle DF x3 trials with 30sec hold.  Pt and son educated on:  -safety with mobility  -bed postioning for joint protection and skin integrity  -use of PRAFO boots for increased DF  -tone inhibition and PROM with knee flex and ankle DF  RN notified to order PRAFO boots.    Patient left supine with all lines intact, call button in reach, RN notified and son present.    GOALS:    Physical Therapy Goals        Problem: Physical Therapy Goal    Goal Priority Disciplines Outcome Goal Variances Interventions   Physical Therapy Goal     PT/OT, PT Ongoing (interventions implemented as appropriate)     Description:  Goals to be met by: 2018     Patient will increase functional independence with mobility by performin. Supine to sit with Moderate Assistance  2. Sit to supine with Moderate Assistance  3. Sit to stand transfer with Maximum Assistance  4. Bed to chair transfer with Maximum Assistance  5. Lower extremity exercise program x15 reps per handout, with assistance as needed                      Time Tracking:     PT Received On: 18  PT Start Time: 1253     PT Stop Time: 1316  PT Total Time (min): 23 min     Billable  Minutes: Therapeutic Activity 23    Treatment Type: Treatment  PT/PTA: PT     PTA Visit Number: 0     HALI SHEPPARD, PT  03/13/2018

## 2018-03-14 LAB
ALBUMIN SERPL BCP-MCNC: 2.1 G/DL
ALP SERPL-CCNC: 72 U/L
ALT SERPL W/O P-5'-P-CCNC: 14 U/L
ANION GAP SERPL CALC-SCNC: 8 MMOL/L
AST SERPL-CCNC: 20 U/L
BASOPHILS # BLD AUTO: 0.01 K/UL
BASOPHILS NFR BLD: 0.1 %
BILIRUB SERPL-MCNC: 0.4 MG/DL
BLD PROD TYP BPU: NORMAL
BLOOD UNIT EXPIRATION DATE: NORMAL
BLOOD UNIT TYPE CODE: 5100
BLOOD UNIT TYPE: NORMAL
BUN SERPL-MCNC: 8 MG/DL
CALCIUM SERPL-MCNC: 8.3 MG/DL
CHLORIDE SERPL-SCNC: 110 MMOL/L
CO2 SERPL-SCNC: 21 MMOL/L
CODING SYSTEM: NORMAL
CREAT SERPL-MCNC: 0.7 MG/DL
DIFFERENTIAL METHOD: ABNORMAL
DISPENSE STATUS: NORMAL
EOSINOPHIL # BLD AUTO: 0 K/UL
EOSINOPHIL NFR BLD: 0.3 %
ERYTHROCYTE [DISTWIDTH] IN BLOOD BY AUTOMATED COUNT: 15.5 %
EST. GFR  (AFRICAN AMERICAN): >60 ML/MIN/1.73 M^2
EST. GFR  (NON AFRICAN AMERICAN): >60 ML/MIN/1.73 M^2
GLUCOSE SERPL-MCNC: 67 MG/DL
HCT VFR BLD AUTO: 20.5 %
HGB BLD-MCNC: 6.5 G/DL
IMM GRANULOCYTES # BLD AUTO: 0.15 K/UL
IMM GRANULOCYTES NFR BLD AUTO: 2.1 %
IRON SERPL-MCNC: 93 UG/DL
LYMPHOCYTES # BLD AUTO: 1 K/UL
LYMPHOCYTES NFR BLD: 13.1 %
MAGNESIUM SERPL-MCNC: 1.6 MG/DL
MCH RBC QN AUTO: 29.8 PG
MCHC RBC AUTO-ENTMCNC: 31.7 G/DL
MCV RBC AUTO: 94 FL
MONOCYTES # BLD AUTO: 0.6 K/UL
MONOCYTES NFR BLD: 7.7 %
NEUTROPHILS # BLD AUTO: 5.6 K/UL
NEUTROPHILS NFR BLD: 76.7 %
NRBC BLD-RTO: 0 /100 WBC
PHOSPHATE SERPL-MCNC: 1.5 MG/DL
PLATELET # BLD AUTO: 173 K/UL
PMV BLD AUTO: 10.5 FL
POTASSIUM SERPL-SCNC: 4 MMOL/L
PROT SERPL-MCNC: 5 G/DL
RBC # BLD AUTO: 2.18 M/UL
RETICS/RBC NFR AUTO: 1.8 %
SATURATED IRON: 59 %
SODIUM SERPL-SCNC: 139 MMOL/L
TOTAL IRON BINDING CAPACITY: 158 UG/DL
TRANS ERYTHROCYTES VOL PATIENT: NORMAL ML
TRANSFERRIN SERPL-MCNC: 107 MG/DL
WBC # BLD AUTO: 7.26 K/UL

## 2018-03-14 PROCEDURE — 80053 COMPREHEN METABOLIC PANEL: CPT

## 2018-03-14 PROCEDURE — P9021 RED BLOOD CELLS UNIT: HCPCS

## 2018-03-14 PROCEDURE — 83735 ASSAY OF MAGNESIUM: CPT

## 2018-03-14 PROCEDURE — 20600001 HC STEP DOWN PRIVATE ROOM

## 2018-03-14 PROCEDURE — 36415 COLL VENOUS BLD VENIPUNCTURE: CPT

## 2018-03-14 PROCEDURE — 97112 NEUROMUSCULAR REEDUCATION: CPT

## 2018-03-14 PROCEDURE — 83540 ASSAY OF IRON: CPT

## 2018-03-14 PROCEDURE — 99024 POSTOP FOLLOW-UP VISIT: CPT | Mod: ,,, | Performed by: PHYSICIAN ASSISTANT

## 2018-03-14 PROCEDURE — 84100 ASSAY OF PHOSPHORUS: CPT

## 2018-03-14 PROCEDURE — 97530 THERAPEUTIC ACTIVITIES: CPT

## 2018-03-14 PROCEDURE — 85045 AUTOMATED RETICULOCYTE COUNT: CPT

## 2018-03-14 PROCEDURE — 36430 TRANSFUSION BLD/BLD COMPNT: CPT

## 2018-03-14 PROCEDURE — 97110 THERAPEUTIC EXERCISES: CPT

## 2018-03-14 PROCEDURE — 85025 COMPLETE CBC W/AUTO DIFF WBC: CPT

## 2018-03-14 RX ORDER — HYDROCODONE BITARTRATE AND ACETAMINOPHEN 500; 5 MG/1; MG/1
TABLET ORAL
Status: DISCONTINUED | OUTPATIENT
Start: 2018-03-14 | End: 2018-03-21

## 2018-03-14 NOTE — PLAN OF CARE
Problem: Occupational Therapy Goal  Goal: Occupational Therapy Goal  Goals to be met by: 3/20/17    Patient will increase functional independence with ADLs by performing:    Feeding with Set-up Assistance.  UE Dressing with Minimum Assistance.  Grooming while seated EOB with Set-up assistance and verbal cues for sequencing of task.  Toileting from bedside commode with Moderate Assistance for hygiene and clothing management.   Supine to sit with Moderate Assistance.  Squat Pivot transfer EOB->bedside chair/wheelchair with Max Assistance.  Toilet transfer Squat Pivot to bedside commode with Max Assistance.      Outcome: Ongoing (interventions implemented as appropriate)  OT goals remain appropriate.  RODDY Rand  3/14/2018

## 2018-03-14 NOTE — PLAN OF CARE
03/14/2018      Lisbeth Seaman  [address] [place] [address]          Hospital Medicine Dept.  [facility] [address] [place] [address]  [phone] after hours  [phone] fax Diagnosis:  Encephalopathy, metabolic                         Debility    Durable Medical Equipment:  Dispense the following for Home Use    1 Hospital Bed     __________________________  Rohit Clemens MD  03/14/2018

## 2018-03-14 NOTE — PLAN OF CARE
Problem: Physical Therapy Goal  Goal: Physical Therapy Goal  Goals to be met by: 2018     Patient will increase functional independence with mobility by performin. Supine to sit with Moderate Assistance  2. Sit to supine with Moderate Assistance  3. Bed to chair transfer with Maximum Assistance  4. Pt will perform dynamic sitting activity x 5 minutes with supervision, no LOB to improve sitting balance   5. Lower extremity exercise program x15 reps per handout, with assistance as needed     Outcome: Ongoing (interventions implemented as appropriate)  Goals updated; continue current POC.     Alka Rivas PT, DPT   3/14/2018  Pager: [phone]

## 2018-03-14 NOTE — NURSING
Pt's son at bedside with other family member on phone. Pt and family member state they are extremely concerned about their ability to transport patient home and care for her at home. They state that the only reason they agreed to take the patient home is that they were unable to pay for the facility she was accepted to. Notified on call MD for IM 1 who stated they will remove the DC order and have case management/social work review the case in the morning.

## 2018-03-14 NOTE — PT/OT/SLP PROGRESS
Physical Therapy Treatment    Patient Name:  Lisbeth Seaman   MRN:  [id number]    Recommendations:     Discharge Recommendations:  nursing facility, skilled (24/7 assistance needed)   Discharge Equipment Recommendations: hospital bed   Barriers to discharge: Decreased caregiver support    Assessment:     Lisbeth Seaman is a 63 y.o. female admitted with a medical diagnosis of Encephalopathy, metabolic.  She presents with the following impairments/functional limitations:  weakness, impaired endurance, impaired functional mobilty, impaired self care skills, impaired balance, impaired cognition, decreased safety awareness, abnormal tone, decreased lower extremity function, decreased upper extremity function, impaired skin. Pt continues to require maximum assistance for bed mobility; max cueing required for ADLs and safety awareness 2/2 cognitive deficits. Pt will require 24/7 care upon discharge. Pt would benefit from continued PT intervention to address below listed deficits and maximize return to PLOF.     Rehab Prognosis:  good; patient would benefit from acute skilled PT services to address these deficits and reach maximum level of function.      Recent Surgery: Procedure(s) (LRB):   SHUNT W/ NAVIGATION (Right) 2 Days Post-Op    Plan:     During this hospitalization, patient to be seen 4 x/week to address the above listed problems via gait training, therapeutic activities, therapeutic exercises, neuromuscular re-education, wheelchair management/training  · Plan of Care Expires:  04/05/18   Plan of Care Reviewed with: patient    Subjective     Communicated with RN prior to session.  Patient found supine upon PT entry to room. Pt alert and agreeable to treatment.  Pt oriented to person only, able to inconsistently follow simple commands.     Chief Complaint: impaired mobility   Pain/Comfort:  · Pain Rating 1: 0/10  · Pain Rating Post-Intervention 1: 0/10    Patients cultural, spiritual, Yazdanism conflicts given  "the current situation: no conflicts    Objective:     Patient found with: peripheral IV     General Precautions: Standard, aspiration, fall   Orthopedic Precautions:N/A   Braces: N/A     Functional Mobility:       Bed Mobility  · Rolling: to left and right with maximum assistance   · Supine to sit: maximum assistance     · Sit to supine: maximum assistance    · Scooting: towards HOB in supine with total assistance        Transfers Not performed; pt with limited functional transfer ability at this time 2/2 BLE deficits and decreased BUE strength, difficulty following commands        AM-PAC 6 CLICK MOBILITY  Turning over in bed (including adjusting bedclothes, sheets and blankets)?: 2  Sitting down on and standing up from a chair with arms (e.g., wheelchair, bedside commode, etc.): 1  Moving from lying on back to sitting on the side of the bed?: 2  Moving to and from a bed to a chair (including a wheelchair)?: 1  Need to walk in hospital room?: 1  Climbing 3-5 steps with a railing?: 1  Total Score: 8       Therapeutic Activities and Exercises:  Therapeutic activities aimed to:  - increase pt's independence, safety, and efficiency with bed mobility. See above for assistance levels.   - improve trunk control and postural awareness  - improve gross motor coordination    Pt required max A to perform sup>sit transfer. While sitting EOB, verbal and tactile cueing provided to improve midline orientation and posture. Pt with forward flexed trunk and decreased weight bearing through LUE- required max cueing to reinforce improving cervical extension and forward gaze and weight bearing through UEs for support. Therapist facilitated pt performing forward reaching outside JOHANN and across midline x 3 trials BUEs- able to reach target 3/3 trials bilaterally. However, when prompted to perform reaching forward for "cup" on breakfast tray, pt reaching for bowel. Pt required max assistance and hand over hand guidance to identify "spoon" " "and "cup" on tray. Able to bring cup to mouth for sip with SBA once directed to cup; able to identify color of objects on tray but unable to recall objects once identified by therapist including "juice" and "lemonade."     Gentle stretch to B hamstrings performed in sitting x30 sec hold, 2 trials bilaterally 2/2 increased muscle tissue tension and tone.     Patient left HOB elevated with all lines intact, call button in reach, bed alarm on and RN notified.    GOALS:    Physical Therapy Goals        Problem: Physical Therapy Goal    Goal Priority Disciplines Outcome Goal Variances Interventions   Physical Therapy Goal     PT/OT, PT Ongoing (interventions implemented as appropriate)     Description:  Goals to be met by: 2018     Patient will increase functional independence with mobility by performin. Supine to sit with Moderate Assistance  2. Sit to supine with Moderate Assistance  3. Bed to chair transfer with Maximum Assistance  4. Pt will perform dynamic sitting activity x 5 minutes with supervision, no LOB to improve sitting balance   5. Lower extremity exercise program x15 reps per handout, with assistance as needed                       Time Tracking:     PT Received On: 18  PT Start Time: 821     PT Stop Time: 847  PT Total Time (min): 26 min     Billable Minutes: Neuromuscular Re-education 26 min    Treatment Type: Treatment  PT/PTA: PT     PTA Visit Number: 0     Alka Rivas PT, DPT   3/14/2018  Pager: [phone]    "

## 2018-03-14 NOTE — DISCHARGE SUMMARY
[facility] Hospital Medicine  Discharge Summary      Patient Name: Lisbeth Seaman  MRN: [id number]  Admission Date: 3/6/2018  Hospital Length of Stay: 8 days  Discharge Date and Time:  03/14/2018 6:05 PM  Attending Physician: Kanika Aaron MD   Discharging Provider: Rohit Clemens MD  Primary Care Provider: Rob Valladares MD  Hospital Medicine Team: [facility] HOSP MED 1 Rohit Clemens MD    HPI:   Lisbeth Seaman is a 63 y.o. Female with extensive PMH who has pmhx HLD, HTN, vascular dementia and stroke is presenting to [facility] for evaluation failure to thrive since discharge from hospital three days ago. Patient is also reported to have history of NPH and needs neurosurgery evaluation for treatment. Daughter reports that pt has been unable to eat or drink, decrease mobility secondary to weakness, and confusion. She also reports decreased bowel movements however this is likely 2/2 to decreased PO intake as patient does have positive bowel sounds in all 4 quadrants. Family thinks that pt needs to be hospitalized due to declining health since discharge. Family contacted PCP today, who advised pt to go to ED for further evaluation.      Patient non-verbal on physical exam, oriented only to person. Family not at bedside and unable to be reached by phone.     Procedure(s) (LRB):   SHUNT W/ NAVIGATION (Right)      Hospital Course:   03/08 NSGY to evaluate today; Day 2/3 of Abx for presumed UTI, cultures pending. SLP re-evaluation for some difficulties during PO intake   03/09 Doing well. Mental status stable. Completed ceftriaxone this morning with test for clearance pending.   03/10 Awaiting UA/Ucx to confirm bacterial clearance of UTI. No issues overnight; continuing to work on adequate PO intake.   03/11 Repeat UA w/o any signs of infection. Patient stable.  shunt scheduled for Monday. NPO after midnight.  03/12 Patient received BP shunt today. Will D/c Bactrim.   03/13 Patient doing well with no  complaints. Her V/P shunt was kinked this morning and settings were adjusted by neurosurgery. Will monitor and obtain abdominal x-ray tomorrow. Patient encouraged PO diet.     In brief, Ms. Seaman presented with AMS and was admitted with UTI and a progression of her NPH. Her mental status improved with fluids, and she completed a three-day course of CTX, after which she underwent  shunt placement by NSGY without complication. The abdominal portion of the shunt appears to be coiled, but NSGY manipulated it and believe it is in place and functioning properly. PT/OT evaluated and again recommended SNF, but unfortunately her family has a high-deductable insurance plan and elected to take her home with home health instead. On the day of discharge, she was hemodynamically stable and without complaint. We discussed the possibility of PEG tube placement, but opted against in favor of encouraging oral intake after her mental status has improved with shunt placement. Physical examination as follows:    Vitals:    03/14/18 1745   BP: 120/72   Pulse: 81   Resp: 20   Temp: 97.8 °F (36.6 °C)       Gen: NAD, conversant  Head: NC, AT  Eyes: PERRLA, EOMI  Throat: MMM, OP clear  CV: RRR, no M/R/G, no peripheral edema, no JVD  Resp: CTAB, no increased work of breathing on room air  GI: Soft, NT, ND, +BS  Ext: MAEW, bilateral lower extremity cyanosis with preserved pulses, intact sensation, improved with warming  Neuro: AAOx2 (self + hospital, unsure of year), CN grossly intact, no focal neurologic deficits     Consults:   Consults         Status Ordering Provider     Inpatient consult to Midline team  Once     Provider:  (Not yet assigned)    Completed BRUCE HENDERSON     Inpatient consult to Neurosurgery  Once     Provider:  (Not yet assigned)    Acknowledged ROBERT FRANCE     Inpatient consult to Registered Dietitian/Nutritionist  Once     Provider:  (Not yet assigned)    Completed ROBERT FRANCE          No con  "Assessment & Plan notes have been filed under this hospital service since the last note was generated.  Service: Hospital Medicine    Final Active Diagnoses:    Diagnosis Date Noted POA    PRINCIPAL PROBLEM:  Encephalopathy, metabolic [G93.41] 02/25/2018 Yes     Chronic    Normal pressure hydrocephalus [G91.2] 03/06/2018 Yes    Debility [R53.81] 03/06/2018 Yes    Vascular dementia without behavioral disturbance [F01.50] 02/25/2018 Yes    Hypertension, essential [I10] 02/25/2018 Yes    Hypophosphatemia [E83.39] 02/25/2018 Yes    Hypokalemia [E87.6] 02/25/2018 Yes      Problems Resolved During this Admission:    Diagnosis Date Noted Date Resolved POA    Acute cystitis with hematuria [N30.01] 03/07/2018 03/08/2018 Yes       Discharged Condition: stable    Disposition: Home-Health Care [facility]    Follow Up:  Follow-up Information     Rob Valladares MD.    Specialty:  Family Medicine  Contact information:  [address] [place] [address] [phone]             Roly Pastor - Neurology.    Specialty:  Neurology  Contact information:  [address]  [facility] [address] [phone]  Additional information:  7th Floor - [facility] [place]           Roly Pastor - Neurosurgery [facility].    Specialty:  Neurosurgery  Contact information:  [address]  [facility] [address] [phone]  Additional information:  7th Floor               Patient Instructions:     HOSPITAL BED FOR HOME USE   Order Specific Question Answer Comments   Type: Electric    Length of need (1-99 months): 99    Does patient have medical equipment at home? bedside commode    Does patient have medical equipment at home? walker, rolling    Does patient have medical equipment at home? wheelchair    Height: 5' 1" (1.549 m)    Weight: 47.2 kg (104 lb 0.9 oz)    Please check all that apply: Patient requires positioning of the body in ways not feasible in an ordinary bed due to a medical condition which is expected to " "last at least one month. NPH     Ambulatory Referral to Neurology   Referral Priority: Routine Referral Type: Consultation   Referral Reason: Specialty Services Required    Requested Specialty: Neurology    Number of Visits Requested: 1      Ambulatory Referral to Neurosurgery   Referral Priority: Routine Referral Type: Consultation   Referral Reason: Specialty Services Required    Requested Specialty: Neurosurgery    Number of Visits Requested: 1      Call MD for:  temperature >100.4     Call MD for:  persistent nausea and vomiting or diarrhea     Call MD for:  severe uncontrolled pain     Call MD for:  redness, tenderness, or signs of infection (pain, swelling, redness, odor or green/yellow discharge around incision site)     Call MD for:  difficulty breathing or increased cough     Call MD for:  severe persistent headache     Call MD for:  worsening rash     Call MD for:  persistent dizziness, light-headedness, or visual disturbances     Call MD for:  increased confusion or weakness         Significant Diagnostic Studies:     CT head without contrast 3/12:  "Status post placement of a right parietal coursing ventriculostomy drain with expected pneumocephalus as detailed above. There is stable prominence the ventricular system.    Severe chronic microvascular ischemic changes and old infarcts as above." - per interpreting radiologist      Pending Diagnostic Studies:     Procedure Component Value Units Date/Time    Reticulocytes [[id number]]     Order Status:  Sent Lab Status:  No result     Specimen:  Blood from Blood     Urinalysis [[id number]] Collected:  03/07/18 0604    Order Status:  Sent Lab Status:  In process Updated:  03/07/18 0604    Specimen:  Urine          Medications:  Reconciled Home Medications:   Current Discharge Medication List      CONTINUE these medications which have CHANGED    Details   atorvastatin (LIPITOR) 40 MG tablet Take 1 tablet (40 mg total) by mouth once daily.  Qty: 90 tablet, " Refills: 3         CONTINUE these medications which have NOT CHANGED    Details   aspirin 81 MG Chew Take 81 mg by mouth once daily.         STOP taking these medications       magnesium oxide (MAG-OX) 400 mg tablet Comments:   Reason for Stopping:               Indwelling Lines/Drains at time of discharge:   Lines/Drains/Airways          No matching active lines, drains, or airways          Time spent on the discharge of patient: 60 minutes  Patient was seen and examined on the date of discharge and determined to be suitable for discharge.         Rohit Clemens MD  Department of Hospital Medicine  [facility]

## 2018-03-14 NOTE — SIGNIFICANT EVENT
Received call from charge nurse at approximately 1830hrs- family does not feel comfortable taking the patient home and would like her to stay overnight and explore further options for placement tomorrow.     Discontinued discharge order.     Riri Ortiz, DO  Internal Medicine, PGY-1

## 2018-03-14 NOTE — PT/OT/SLP PROGRESS
Occupational Therapy   Treatment    Name: Lisbeth Seaman  MRN: [id number]  Admitting Diagnosis:  Encephalopathy, metabolic  2 Days Post-Op    Recommendations:     Discharge Recommendations: nursing facility, skilled (24/7 assistance needed)  Discharge Equipment Recommendations:  hospital bed (PRAFO boots)  Barriers to discharge:  Decreased caregiver support (increased level of assistance required)    Subjective     Communicated with: RN prior to session.  Pain/Comfort:  · Pain Rating 1:  (pt indicates pain only with PROM stretching 2* tightness)    Objective:     Patient found with: peripheral IV, blood pressure cuff, bed alarm (pt receiving blood during session)    General Precautions: Standard, fall   Orthopedic Precautions:N/A   Braces: N/A     Occupational Performance:    Bed Mobility:    · N/A due to pt receiving blood during session    Activities of Daily Living:  · N/A due to pt receiving blood during session    Treatment & Education:  · Pt tolerated BUE/BLE PROM, gentle trunk rotations, and cervical neck rotations from bed level; increased tone noted in RUE/RLE; decreased tightness noted with trunk rotations  · Pt's son edu on level of assistance pt will need for d/c home including:  · DME recommendations - hospital bed, PRAFO boots (RN notified to order)  · PROM at least once a day for BUE/BLE in all planes of motion  · Rotating hips every 2-3 hours for pressure relief to prevent skin breakdown  · Incontinence care and frequent hygiene maintenance to prevent skin breakdown  · Floating heels to prevent pressure sores  · Pt's son demonstrated good understanding of all home care education    Patient left supine with all lines intact, call button in reach, bed alarm on and son present    [facility] 6 Click:  AMPA Total Score: 12  Education:    Assessment:     Lisbeth Seaman is a 63 y.o. female with a medical diagnosis of Encephalopathy, metabolic.  She presents with decreased cognition and increased BUE/BLE tone  which causes her to be dependent for all functional mobility and ADLs. Pt's son educated this session in order to maximize safe care for pt with d/c home due to limited insurance coverage for post-acute care. Performance deficits affecting function are weakness, impaired endurance, impaired self care skills, impaired balance, impaired cognition, decreased coordination, decreased upper extremity function, decreased lower extremity function, decreased safety awareness, pain, abnormal tone, impaired skin.      Rehab Prognosis:  fair; patient would benefit from acute skilled OT services to address these deficits and reach maximum level of function.       Plan:     Patient to be seen 4 x/week to address the above listed problems via self-care/home management, therapeutic activities, therapeutic exercises, neuromuscular re-education, cognitive retraining  · Plan of Care Expires: 04/12/18  · Plan of Care Reviewed with: patient, son    This Plan of care has been discussed with the patient who was involved in its development and understands and is in agreement with the identified goals and treatment plan    GOALS:    Occupational Therapy Goals        Problem: Occupational Therapy Goal    Goal Priority Disciplines Outcome Interventions   Occupational Therapy Goal     OT, PT/OT Ongoing (interventions implemented as appropriate)    Description:  Goals to be met by: 3/20/17    Patient will increase functional independence with ADLs by performing:    Feeding with Set-up Assistance.  UE Dressing with Minimum Assistance.  Grooming while seated EOB with Set-up assistance and verbal cues for sequencing of task.  Toileting from bedside commode with Moderate Assistance for hygiene and clothing management.   Supine to sit with Moderate Assistance.  Squat Pivot transfer EOB->bedside chair/wheelchair with Max Assistance.  Toilet transfer Squat Pivot to bedside commode with Max Assistance.                       Time Tracking:     OT Date  of Treatment: 03/14/18  OT Start Time: 1532  OT Stop Time: 1556  OT Total Time (min): 24 min    Billable Minutes:Therapeutic Activity 14  Therapeutic Exercise 10    RODDY Rand  3/14/2018

## 2018-03-14 NOTE — PLAN OF CARE
[facility]    HOME HEALTH ORDERS  FACE TO FACE ENCOUNTER    Patient Name: Lisbeth Seaman  YOB: 1955    PCP: Rob Valladares MD   PCP Address: [address] / [place] [address]  PCP Phone Number: [phone]  PCP Fax: [phone]    Encounter Date: 03/14/2018    Admit to Home Health    Diagnoses:  Active Hospital Problems    Diagnosis  POA    *Encephalopathy, metabolic [G93.41]  Yes     Chronic    Normal pressure hydrocephalus [G91.2]  Yes    Debility [R53.81]  Yes    Vascular dementia without behavioral disturbance [F01.50]  Yes    Hypertension, essential [I10]  Yes    Hypophosphatemia [E83.39]  Yes    Hypokalemia [E87.6]  Yes      Resolved Hospital Problems    Diagnosis Date Resolved POA    Acute cystitis with hematuria [N30.01] 03/08/2018 Yes       No future appointments.  Follow-up Information     Rob Valladares MD.    Specialty:  Family Medicine  Contact information:  [address] [place] [address] [phone]             Roly Pastor - Neurology.    Specialty:  Neurology  Contact information:  [address] [place] [address] [phone]  Additional information:  7th Floor - [facility]           Roly Pastor - Neurosurgery [facility].    Specialty:  Neurosurgery  Contact information:  [facility] [place] [address] [phone]  Additional information:  7th Floor                   I have seen and examined this patient face to face today. My clinical findings that support the need for the home health skilled services and home bound status are the following:  Weakness/numbness causing balance and gait disturbance due to Weakness/Debility making it taxing to leave home.  Mental confusion making it unsafe for patient to leave home alone due to  Dementia.    Allergies:Review of patient's allergies indicates:  No Known Allergies    Diet: Dental soft diet, think liquids    Activities: activity as  tolerated    Nursing:   SN to complete comprehensive assessment including routine vital signs. Instruct on disease process and s/s of complications to report to MD. Review/verify medication list sent home with the patient at time of discharge  and instruct patient/caregiver as needed. Frequency may be adjusted depending on start of care date.    Notify MD if SBP > 160 or < 90; DBP > 90 or < 50; HR > 120 or < 50; Temp > 101    CONSULTS:    Physical Therapy to evaluate and treat. Evaluate for home safety and equipment needs; Establish/upgrade home exercise program. Perform / instruct on therapeutic exercises, gait training, transfer training, and Range of Motion.  Occupational Therapy to evaluate and treat. Evaluate home environment for safety and equipment needs. Perform/Instruct on transfers, ADL training, ROM, and therapeutic exercises.   to evaluate for community resources/long-range planning.  Aide to provide assistance with personal care, ADLs, and vital signs.    MISCELLANEOUS CARE:  N/A    WOUND CARE ORDERS   n/a    Medications: Review discharge medications with patient and family and provide education.      Current Discharge Medication List      CONTINUE these medications which have CHANGED    Details   atorvastatin (LIPITOR) 40 MG tablet Take 1 tablet (40 mg total) by mouth once daily.  Qty: 90 tablet, Refills: 3         CONTINUE these medications which have NOT CHANGED    Details   aspirin 81 MG Chew Take 81 mg by mouth once daily.         STOP taking these medications       magnesium oxide (MAG-OX) 400 mg tablet Comments:   Reason for Stopping:               I certify that this patient is confined to her home and needs intermittent skilled nursing care, physical therapy and occupational therapy.

## 2018-03-14 NOTE — NURSING
Hosp med 2 team paged and called for evaluation of patient's  lower extremities. Patient has sensation of lower extremities.  MD came and assessed patient. MD talked to son and discussed about patient's discharge to home,will be home bound status and will be in the care of home health skilled services. Transfusing blood and patient tolerating transfusion. Vital signs stable.

## 2018-03-15 LAB
ALBUMIN SERPL BCP-MCNC: 2.3 G/DL
ALP SERPL-CCNC: 87 U/L
ALT SERPL W/O P-5'-P-CCNC: 16 U/L
ANION GAP SERPL CALC-SCNC: 11 MMOL/L
AST SERPL-CCNC: 25 U/L
BASOPHILS # BLD AUTO: 0.04 K/UL
BASOPHILS NFR BLD: 0.5 %
BILIRUB SERPL-MCNC: 0.7 MG/DL
BUN SERPL-MCNC: 7 MG/DL
CALCIUM SERPL-MCNC: 9 MG/DL
CHLORIDE SERPL-SCNC: 108 MMOL/L
CO2 SERPL-SCNC: 20 MMOL/L
CREAT SERPL-MCNC: 0.7 MG/DL
DIFFERENTIAL METHOD: ABNORMAL
EOSINOPHIL # BLD AUTO: 0 K/UL
EOSINOPHIL NFR BLD: 0.5 %
ERYTHROCYTE [DISTWIDTH] IN BLOOD BY AUTOMATED COUNT: 16.6 %
EST. GFR  (AFRICAN AMERICAN): >60 ML/MIN/1.73 M^2
EST. GFR  (NON AFRICAN AMERICAN): >60 ML/MIN/1.73 M^2
GLUCOSE SERPL-MCNC: 61 MG/DL
HCT VFR BLD AUTO: 28.3 %
HGB BLD-MCNC: 9.1 G/DL
IMM GRANULOCYTES # BLD AUTO: 0.11 K/UL
IMM GRANULOCYTES NFR BLD AUTO: 1.3 %
LYMPHOCYTES # BLD AUTO: 0.9 K/UL
LYMPHOCYTES NFR BLD: 10 %
MAGNESIUM SERPL-MCNC: 1.7 MG/DL
MCH RBC QN AUTO: 29.4 PG
MCHC RBC AUTO-ENTMCNC: 32.2 G/DL
MCV RBC AUTO: 92 FL
MONOCYTES # BLD AUTO: 0.7 K/UL
MONOCYTES NFR BLD: 7.9 %
NEUTROPHILS # BLD AUTO: 6.9 K/UL
NEUTROPHILS NFR BLD: 79.8 %
NRBC BLD-RTO: 0 /100 WBC
PHOSPHATE SERPL-MCNC: 1.6 MG/DL
PLATELET # BLD AUTO: 176 K/UL
PMV BLD AUTO: 10.7 FL
POCT GLUCOSE: 62 MG/DL (ref 70–110)
POCT GLUCOSE: 65 MG/DL (ref 70–110)
POCT GLUCOSE: 69 MG/DL (ref 70–110)
POCT GLUCOSE: 72 MG/DL (ref 70–110)
POCT GLUCOSE: 82 MG/DL (ref 70–110)
POTASSIUM SERPL-SCNC: 4 MMOL/L
PROT SERPL-MCNC: 5.9 G/DL
RBC # BLD AUTO: 3.09 M/UL
SODIUM SERPL-SCNC: 139 MMOL/L
WBC # BLD AUTO: 8.59 K/UL

## 2018-03-15 PROCEDURE — 84100 ASSAY OF PHOSPHORUS: CPT

## 2018-03-15 PROCEDURE — 83735 ASSAY OF MAGNESIUM: CPT

## 2018-03-15 PROCEDURE — 25000003 PHARM REV CODE 250: Performed by: INTERNAL MEDICINE

## 2018-03-15 PROCEDURE — 25000003 PHARM REV CODE 250: Performed by: STUDENT IN AN ORGANIZED HEALTH CARE EDUCATION/TRAINING PROGRAM

## 2018-03-15 PROCEDURE — 36415 COLL VENOUS BLD VENIPUNCTURE: CPT

## 2018-03-15 PROCEDURE — 99232 SBSQ HOSP IP/OBS MODERATE 35: CPT | Mod: ,,, | Performed by: HOSPITALIST

## 2018-03-15 PROCEDURE — 85025 COMPLETE CBC W/AUTO DIFF WBC: CPT

## 2018-03-15 PROCEDURE — 80053 COMPREHEN METABOLIC PANEL: CPT

## 2018-03-15 PROCEDURE — 20600001 HC STEP DOWN PRIVATE ROOM

## 2018-03-15 PROCEDURE — 99900035 HC TECH TIME PER 15 MIN (STAT)

## 2018-03-15 RX ADMIN — PANTOPRAZOLE SODIUM 40 MG: 40 TABLET, DELAYED RELEASE ORAL at 08:03

## 2018-03-15 RX ADMIN — ATORVASTATIN CALCIUM 40 MG: 20 TABLET, FILM COATED ORAL at 08:03

## 2018-03-15 RX ADMIN — SODIUM CHLORIDE, POTASSIUM CHLORIDE, SODIUM LACTATE AND CALCIUM CHLORIDE: 600; 310; 30; 20 INJECTION, SOLUTION INTRAVENOUS at 08:03

## 2018-03-15 RX ADMIN — Medication 400 MG: at 08:03

## 2018-03-15 RX ADMIN — HYDROCODONE BITARTRATE AND ACETAMINOPHEN 1 TABLET: 5; 325 TABLET ORAL at 11:03

## 2018-03-15 NOTE — ASSESSMENT & PLAN NOTE
- Stable  - Mildly enlarged ventricles on previous scans  - Therapeutic/diagnostic tap w/ 7 cc off on previous admission did render improvement in symptoms; PT pre and post testing confirmed     - NSGY following  - Holding anticoagulation at this time  - V/P shunt placed on 3/12  - Per Neurosurgery, patient will require to sit upright and will reevaluate tomorrow after reconfiguration of her shunt settings.   - Stable. Patient will require follow up outpatient.

## 2018-03-15 NOTE — SUBJECTIVE & OBJECTIVE
Interval History:     NAEON     Review of Systems   Unable to perform ROS: Dementia   Constitutional: Negative for fever and unexpected weight change.   HENT: Positive for dental problem.    Respiratory: Negative for cough.    Cardiovascular: Negative for chest pain.   Gastrointestinal: Negative.    Neurological: Positive for weakness.     Objective:     Vital Signs (Most Recent):  Temp: 99 °F (37.2 °C) (03/15/18 1151)  Pulse: 91 (03/15/18 1151)  Resp: 18 (03/15/18 1151)  BP: (!) 171/95 (03/15/18 1151)  SpO2: 96 % (03/15/18 1151) Vital Signs (24h Range):  Temp:  [97.5 °F (36.4 °C)-99 °F (37.2 °C)] 99 °F (37.2 °C)  Pulse:  [81-91] 91  Resp:  [18-20] 18  SpO2:  [93 %-100 %] 96 %  BP: (120-171)/(72-97) 171/95     Weight: 47.2 kg (104 lb 0.9 oz)  Body mass index is 19.66 kg/m².  No intake or output data in the 24 hours ending 03/15/18 1400   Physical Exam   Constitutional: She appears well-developed. No distress.   HENT:   Head: Normocephalic and atraumatic.   Mouth/Throat: No oropharyngeal exudate.   Eyes: EOM are normal. Pupils are equal, round, and reactive to light. No scleral icterus.   Neck: Normal range of motion. Neck supple.   Cardiovascular: Normal rate, regular rhythm, normal heart sounds and intact distal pulses.    No murmur heard.  Pulmonary/Chest: Effort normal. No respiratory distress. She has no wheezes.   Abdominal: Soft. Bowel sounds are normal. She exhibits no distension.   Musculoskeletal: Normal range of motion. She exhibits no edema.   Lymphadenopathy:     She has no cervical adenopathy.   Neurological: She is alert.   Skin: Skin is warm. She is not diaphoretic.   Diffuse ecchymoses and purpura    Psychiatric:   Able to respond with some single word answers; pleasant, alert and awake     Vitals reviewed.    Significant Labs:   CBC:   Recent Labs  Lab 03/14/18  0500 03/15/18  0532   WBC 7.26 8.59   HGB 6.5* 9.1*   HCT 20.5* 28.3*    176     CMP:   Recent Labs  Lab 03/14/18  7286  03/15/18  0532    139   K 4.0 4.0    108   CO2 21* 20*   GLU 67* 61*   BUN 8 7*   CREATININE 0.7 0.7   CALCIUM 8.3* 9.0   PROT 5.0* 5.9*   ALBUMIN 2.1* 2.3*   BILITOT 0.4 0.7   ALKPHOS 72 87   AST 20 25   ALT 14 16   ANIONGAP 8 11   EGFRNONAA >60.0 >60.0

## 2018-03-15 NOTE — PROGRESS NOTES
[facility] Hospital Medicine  Progress Note    Patient Name: Lisbeth Seaman  MRN: [id number]  Patient Class: IP- Inpatient   Admission Date: 3/6/2018  Length of Stay: 9 days  Attending Physician: Kanika Aaron MD  Primary Care Provider: Rob Valladares MD    [facility] Medicine Team: [facility] HOSP MED 1 Mika Valiente MD    Subjective:     Principal Problem:Encephalopathy, metabolic    HPI:  Lisbeth Seaman is a 63 y.o. Female with extensive PMH who has pmhx HLD, HTN, vascular dementia and stroke is presenting to [facility] for evaluation failure to thrive since discharge from hospital three days ago. Patient is also reported to have history of NPH and needs neurosurgery evaluation for treatment. Daughter reports that pt has been unable to eat or drink, decrease mobility secondary to weakness, and confusion. She also reports decreased bowel movements however this is likely 2/2 to decreased PO intake as patient does have positive bowel sounds in all 4 quadrants. Family thinks that pt needs to be hospitalized due to declining health since discharge. Family contacted PCP today, who advised pt to go to ED for further evaluation.      Patient non-verbal on physical exam, oriented only to person. Family not at bedside and unable to be reached by phone.     Hospital Course:  03/08 NSGY to evaluate today; Day 2/3 of Abx for presumed UTI, cultures pending. SLP re-evaluation for some difficulties during PO intake   03/09 Doing well. Mental status stable. Completed ceftriaxone this morning with test for clearance pending.   03/10 Awaiting UA/Ucx to confirm bacterial clearance of UTI. No issues overnight; continuing to work on adequate PO intake.   03/11 Repeat UA w/o any signs of infection. Patient stable.  shunt scheduled for Monday. NPO after midnight.  03/12 Patient received BP shunt today. Will D/c Bactrim.   03/13 Patient doing well with no complaints. Her V/P shunt was kinked this morning and settings were  adjusted by neurosurgery. Will monitor and obtain abdominal x-ray tomorrow. Patient encouraged PO diet.   03/14-15 Patient's X-ray abdomen showed kinked V/P which has resolved. She is medically stable for discharge but would like to go to SNF.         Interval History:     NAEON     Review of Systems   Unable to perform ROS: Dementia   Constitutional: Negative for fever and unexpected weight change.   HENT: Positive for dental problem.    Respiratory: Negative for cough.    Cardiovascular: Negative for chest pain.   Gastrointestinal: Negative.    Neurological: Positive for weakness.     Objective:     Vital Signs (Most Recent):  Temp: 99 °F (37.2 °C) (03/15/18 1151)  Pulse: 91 (03/15/18 1151)  Resp: 18 (03/15/18 1151)  BP: (!) 171/95 (03/15/18 1151)  SpO2: 96 % (03/15/18 1151) Vital Signs (24h Range):  Temp:  [97.5 °F (36.4 °C)-99 °F (37.2 °C)] 99 °F (37.2 °C)  Pulse:  [81-91] 91  Resp:  [18-20] 18  SpO2:  [93 %-100 %] 96 %  BP: (120-171)/(72-97) 171/95     Weight: 47.2 kg (104 lb 0.9 oz)  Body mass index is 19.66 kg/m².  No intake or output data in the 24 hours ending 03/15/18 1400   Physical Exam   Constitutional: She appears well-developed. No distress.   HENT:   Head: Normocephalic and atraumatic.   Mouth/Throat: No oropharyngeal exudate.   Eyes: EOM are normal. Pupils are equal, round, and reactive to light. No scleral icterus.   Neck: Normal range of motion. Neck supple.   Cardiovascular: Normal rate, regular rhythm, normal heart sounds and intact distal pulses.    No murmur heard.  Pulmonary/Chest: Effort normal. No respiratory distress. She has no wheezes.   Abdominal: Soft. Bowel sounds are normal. She exhibits no distension.   Musculoskeletal: Normal range of motion. She exhibits no edema.   Lymphadenopathy:     She has no cervical adenopathy.   Neurological: She is alert.   Skin: Skin is warm. She is not diaphoretic.   Diffuse ecchymoses and purpura    Psychiatric:   Able to respond with some single word  answers; pleasant, alert and awake     Vitals reviewed.    Significant Labs:   CBC:   Recent Labs  Lab 03/14/18  0500 03/15/18  0532   WBC 7.26 8.59   HGB 6.5* 9.1*   HCT 20.5* 28.3*    176     CMP:   Recent Labs  Lab 03/14/18  0500 03/15/18  0532    139   K 4.0 4.0    108   CO2 21* 20*   GLU 67* 61*   BUN 8 7*   CREATININE 0.7 0.7   CALCIUM 8.3* 9.0   PROT 5.0* 5.9*   ALBUMIN 2.1* 2.3*   BILITOT 0.4 0.7   ALKPHOS 72 87   AST 20 25   ALT 14 16   ANIONGAP 8 11   EGFRNONAA >60.0 >60.0     Assessment/Plan:      * Encephalopathy, metabolic    - Stable  - Dx of NPH after a LP on previous admission w/ improvement based on pre- and post- LP PT evaluation. Her mental status remains improved since this intervention.  - NSGY following, appreciate assistance.  -  shunt placement on 3/12.          Debility    -likely 2/2 to previous stroke  -PT/OT following, recommending SNF, though this is unfortunately not an option for her given their financial situation. Continuing to discuss with case management        Normal pressure hydrocephalus    - Stable  - Mildly enlarged ventricles on previous scans  - Therapeutic/diagnostic tap w/ 7 cc off on previous admission did render improvement in symptoms; PT pre and post testing confirmed     - NSGY following  - Holding anticoagulation at this time  - V/P shunt placed on 3/12  - Per Neurosurgery, patient will require to sit upright and will reevaluate tomorrow after reconfiguration of her shunt settings.   - Stable. Patient will require follow up outpatient.         Hypophosphatemia    - Following daily and replacing PRN        Hypokalemia    - Following daily and replacing PRN        Hypertension, essential    - Stable  - Will continue to monitor        Vascular dementia without behavioral disturbance    - Stable  - Continue Statin at this time  - Neurosurg following  - Neuro checks q4  - Patient's current medical state is likely 2/2 combination of cebro-vascular  disease and NPH component. Her mental status was not affected by the UTI and she remains stable  - Repeat UA w/o any evidence of UTI   - Bactrim d/c          VTE Risk Mitigation         Ordered     Medium Risk of VTE  Once      03/12/18 0934     Place sequential compression device  Until discontinued      03/12/18 0934     Place sequential compression device  Until discontinued      03/06/18 9949        Mika Valiente MD  Department of Hospital Medicine   [facility]

## 2018-03-15 NOTE — ASSESSMENT & PLAN NOTE
62 yo F with increased confusion and failure to thrive with known NPH, s/p   Shunt placement POD 2  - Pt is neurologically stable, improving cognition s/p shunt   - Post op HCT is stable with expected findings   - Repeat Abd x-ray is stable.  Repeat Fluoroscopy confirms Hakim valve set a 200 mm H2O.     - Ok for DC from neurosurgical standpoint  - Continue current medical management per primary team  - F/u with neurosurgery in 2 weeks, please call with questions   - Discussed with Dr. Baker

## 2018-03-15 NOTE — PROGRESS NOTES
[facility]  Neurosurgery  Progress Note    Subjective:     History of Present Illness: Patient is 64 yo F h/o vascular dementia, stroke 9/2017, HTN, HLD, and NPH. She presents for evaluation of failure to thrive since discharge from hospital three days ago.  Per family functional status has been steadily declining since stroke in 9/2017 including not being able to ambulate on her own or dress, feed, or bath herself. Pt was hospitalized stay from 2/25/2018-3/3/2018,  Pt was evaluated by neurosurgery for NPH,  she had a high volume LP with   improvement of gait and cognitive symptoms, and was scheduled for  shunt placement back home in [place].  Since being home, she has been working with home health, her son reports one day after discharge stay she began to show increase in generalized weakness and confusion.  Pt was found to have UTI and currently admitted under medicine service.  Neurosurgery consulted for evaluation of NPH/possible  shunt placement.  Patient is unable to provide reliable history. Son at bedside provided history.           Post-Op Info:  Procedure(s) (LRB):   SHUNT W/ NAVIGATION (Right)   2 Days Post-Op     Interval History:  NAEON.  Continued gradual improvement in cognition.  Denies N/V, visual changes, weakness, or seizures.        Medications:  Continuous Infusions:   lactated ringers Stopped (03/08/18 1130)     Scheduled Meds:   atorvastatin  40 mg Oral Daily    magnesium oxide  400 mg Oral BID    pantoprazole  40 mg Oral Daily     PRN Meds:acetaminophen, acetaminophen, acetaminophen, dextrose 50%, dextrose 50%, glucagon (human recombinant), glucose, glucose, hydrocodone-acetaminophen 5-325mg, ondansetron, ramelteon, sodium chloride 0.9%, sodium chloride 0.9%     Review of Systems  Objective:     Weight: 47.2 kg (104 lb 0.9 oz)  Body mass index is 19.66 kg/m².  Vital Signs (Most Recent):  Temp: 97.2 °F (36.2 °C) (03/13/18 1049)  Pulse: (!) 113 (03/13/18 1049)  Resp: 18  (03/13/18 1049)  BP: 127/73 (03/13/18 1049)  SpO2: 98 % (03/13/18 0730) Vital Signs (24h Range):  Temp:  [96.8 °F (36 °C)-97.8 °F (36.6 °C)] 97.2 °F (36.2 °C)  Pulse:  [] 113  Resp:  [18] 18  SpO2:  [97 %-98 %] 98 %  BP: (127-147)/(73-94) 127/73                           Neurosurgery Physical Exam   Neurologic: Awake, Alert. Oriented to person and hospital  Head: normocephalic  Baseline Dementia   GCS: Motor: 6/Verbal: 4/Eyes: 4 GCS Total: 14  Cranial nerves: face symmetric, tongue midline, pupils equal, round, reactive to light with accomodation, extraocular muscles intact  Sensory: response to light touch throughout  Motor Strength: generalized weakness   No focal numbness or weakness  Lungs:  normal respiratory effort  Abdomen: soft  Extremities: no cyanosis or edema, or clubbing  Surgical incision is c/d/i    Significant Labs:    Recent Labs  Lab 03/12/18  0345 03/13/18  0459   GLU 82 90    137   K 4.5 4.4    108   CO2 27 22*   BUN 5* 8   CREATININE 0.9 0.8   CALCIUM 8.9 8.4*   MG 2.0 1.6       Recent Labs  Lab 03/12/18  0344 03/13/18  0500   WBC 6.46 11.64   HGB 8.2* 7.5*   HCT 25.8* 23.7*   * 190     No results for input(s): LABPT, INR, APTT in the last 48 hours.  Microbiology Results (last 7 days)     Procedure Component Value Units Date/Time    Urine culture [[id number]] Collected:  03/10/18 1135    Order Status:  Completed Specimen:  Urine from Urine, Catheterized Updated:  03/11/18 2023     Urine Culture, Routine No growth    Urine culture [[id number]]     Order Status:  Canceled Specimen:  Urine             Assessment/Plan:     Normal pressure hydrocephalus    64 yo F with increased confusion and failure to thrive with known NPH, s/p   Shunt placement POD 2  - Pt is neurologically stable, improving cognition s/p shunt   - Post op HCT is stable with expected findings   - Repeat Abd x-ray is stable.  Repeat Fluoroscopy confirms Hakim valve set a 200 mm H2O.     - Ok for DC from  neurosurgical standpoint  - Continue current medical management per primary team  - F/u with neurosurgery in 2 weeks, please call with questions   - Discussed with MICKI Paniagua  Neurosurgery  [facility]

## 2018-03-16 PROBLEM — R62.7 FAILURE TO THRIVE IN ADULT: Status: ACTIVE | Noted: 2018-03-16

## 2018-03-16 LAB
POCT GLUCOSE: 113 MG/DL (ref 70–110)
POCT GLUCOSE: 95 MG/DL (ref 70–110)

## 2018-03-16 PROCEDURE — 25000003 PHARM REV CODE 250: Performed by: INTERNAL MEDICINE

## 2018-03-16 PROCEDURE — 99232 SBSQ HOSP IP/OBS MODERATE 35: CPT | Mod: ,,, | Performed by: HOSPITALIST

## 2018-03-16 PROCEDURE — 25000003 PHARM REV CODE 250: Performed by: STUDENT IN AN ORGANIZED HEALTH CARE EDUCATION/TRAINING PROGRAM

## 2018-03-16 PROCEDURE — 97530 THERAPEUTIC ACTIVITIES: CPT

## 2018-03-16 PROCEDURE — 97110 THERAPEUTIC EXERCISES: CPT

## 2018-03-16 PROCEDURE — 63600175 PHARM REV CODE 636 W HCPCS: Performed by: STUDENT IN AN ORGANIZED HEALTH CARE EDUCATION/TRAINING PROGRAM

## 2018-03-16 PROCEDURE — 20600001 HC STEP DOWN PRIVATE ROOM

## 2018-03-16 PROCEDURE — 99900035 HC TECH TIME PER 15 MIN (STAT)

## 2018-03-16 RX ORDER — ENOXAPARIN SODIUM 100 MG/ML
40 INJECTION SUBCUTANEOUS EVERY 24 HOURS
Status: DISCONTINUED | OUTPATIENT
Start: 2018-03-16 | End: 2018-03-22 | Stop reason: HOSPADM

## 2018-03-16 RX ORDER — DEXTROSE, SODIUM CHLORIDE, SODIUM LACTATE, POTASSIUM CHLORIDE, AND CALCIUM CHLORIDE 5; .6; .31; .03; .02 G/100ML; G/100ML; G/100ML; G/100ML; G/100ML
INJECTION, SOLUTION INTRAVENOUS CONTINUOUS
Status: DISCONTINUED | OUTPATIENT
Start: 2018-03-16 | End: 2018-03-22 | Stop reason: HOSPADM

## 2018-03-16 RX ORDER — MIRTAZAPINE 7.5 MG/1
7.5 TABLET, FILM COATED ORAL NIGHTLY
Status: DISCONTINUED | OUTPATIENT
Start: 2018-03-16 | End: 2018-03-18

## 2018-03-16 RX ADMIN — PANTOPRAZOLE SODIUM 40 MG: 40 TABLET, DELAYED RELEASE ORAL at 09:03

## 2018-03-16 RX ADMIN — MIRTAZAPINE 7.5 MG: 7.5 TABLET ORAL at 09:03

## 2018-03-16 RX ADMIN — ENOXAPARIN SODIUM 40 MG: 100 INJECTION SUBCUTANEOUS at 08:03

## 2018-03-16 RX ADMIN — SODIUM CHLORIDE, POTASSIUM CHLORIDE, SODIUM LACTATE AND CALCIUM CHLORIDE: 600; 310; 30; 20 INJECTION, SOLUTION INTRAVENOUS at 02:03

## 2018-03-16 RX ADMIN — HYDROCODONE BITARTRATE AND ACETAMINOPHEN 1 TABLET: 5; 325 TABLET ORAL at 04:03

## 2018-03-16 RX ADMIN — SODIUM CHLORIDE, SODIUM LACTATE, POTASSIUM CHLORIDE, CALCIUM CHLORIDE, AND DEXTROSE MONOHYDRATE: 600; 310; 30; 20; 5 INJECTION, SOLUTION INTRAVENOUS at 09:03

## 2018-03-16 RX ADMIN — ATORVASTATIN CALCIUM 40 MG: 20 TABLET, FILM COATED ORAL at 09:03

## 2018-03-16 NOTE — SUBJECTIVE & OBJECTIVE
Interval History:     NAEON    Review of Systems   Unable to perform ROS: Dementia   Constitutional: Negative for fever and unexpected weight change.   HENT: Positive for dental problem.    Respiratory: Negative for cough.    Cardiovascular: Negative for chest pain.   Gastrointestinal: Negative.    Neurological: Positive for weakness.     Objective:     Vital Signs (Most Recent):  Temp: 97.7 °F (36.5 °C) (03/16/18 0121)  Pulse: 92 (03/16/18 0420)  Resp: 16 (03/15/18 2115)  BP: (!) 153/81 (03/16/18 0426)  SpO2: 98 % (03/16/18 0420) Vital Signs (24h Range):  Temp:  [97.7 °F (36.5 °C)-99 °F (37.2 °C)] 97.7 °F (36.5 °C)  Pulse:  [82-99] 92  Resp:  [16-18] 16  SpO2:  [94 %-98 %] 98 %  BP: (147-173)/(81-97) 153/81     Weight: 47.2 kg (104 lb 0.9 oz)  Body mass index is 19.66 kg/m².  No intake or output data in the 24 hours ending 03/16/18 1130   Physical Exam   Constitutional: She appears well-developed. No distress.   HENT:   Head: Normocephalic and atraumatic.   Mouth/Throat: No oropharyngeal exudate.   Eyes: EOM are normal. Pupils are equal, round, and reactive to light. No scleral icterus.   Neck: Normal range of motion. Neck supple.   Cardiovascular: Normal rate, regular rhythm, normal heart sounds and intact distal pulses.    No murmur heard.  Pulmonary/Chest: Effort normal. No respiratory distress. She has no wheezes.   Abdominal: Soft. Bowel sounds are normal. She exhibits no distension.   Musculoskeletal: Normal range of motion. She exhibits no edema.   Lymphadenopathy:     She has no cervical adenopathy.   Neurological: She is alert.   Skin: Skin is warm. She is not diaphoretic.   Diffuse ecchymoses and purpura    Psychiatric:   Able to respond with some single word answers; pleasant, alert and awake     Vitals reviewed.    Significant Labs:   BMP:   Recent Labs  Lab 03/15/18  0532   GLU 61*      K 4.0      CO2 20*   BUN 7*   CREATININE 0.7   CALCIUM 9.0   MG 1.7     CBC:   Recent Labs  Lab  03/15/18  0532   WBC 8.59   HGB 9.1*   HCT 28.3*

## 2018-03-16 NOTE — PT/OT/SLP PROGRESS
Physical Therapy Treatment    Patient Name:  Lisbeth Seaman   MRN:  [id number]    Recommendations:     Discharge Recommendations:  nursing facility, skilled   Discharge Equipment Recommendations: hospital bed   Barriers to discharge: Decreased caregiver support    Assessment:     Lisbeth Seaman is a 63 y.o. female admitted with a medical diagnosis of Encephalopathy, metabolic.  She presents with the following impairments/functional limitations:  weakness, impaired endurance, impaired functional mobilty, impaired self care skills, impaired balance, impaired cognition, decreased safety awareness, decreased lower extremity function, abnormal tone. Pt alert during session, continues to display poor appetite and minimal PO intake, leading to low blood glucose this AM. Session limited to in bed therapeutic activities. Pt would benefit from continued PT intervention to address below listed deficits and maximize return to PLOF.     Rehab Prognosis:  fair; patient would benefit from acute skilled PT services to address these deficits and reach maximum level of function.      Recent Surgery: Procedure(s) (LRB):   SHUNT W/ NAVIGATION (Right) 4 Days Post-Op    Plan:     During this hospitalization, patient to be seen 3 x/week to address the above listed problems via gait training, therapeutic activities, therapeutic exercises, neuromuscular re-education, wheelchair management/training  · Plan of Care Expires:  04/05/18   Plan of Care Reviewed with: patient    Subjective     Communicated with RN prior to session.  Patient found HOB elevated with RN present upon PT entry to room, agreeable to treatment.  Per RN (Janice), pt with low blood glucose reading this AM. Therapist and RN encouraging pt to trial juices at bedside. Pt able to take sips of apple and orange juice with assistance, willingly opening mouth and swallowing but with grimacing with each sip.     Chief Complaint: weakness   Patient comments/goals:  "none  Pain/Comfort:  · Pain Rating 1: 0/10  · Pain Rating Post-Intervention 1: 0/10    Patients cultural, spiritual, [employer] conflicts given the current situation: no conflicts    Objective:     Patient found with: peripheral IV, telemetry     General Precautions: Standard, fall   Orthopedic Precautions:N/A   Braces: N/A     Functional Mobility:  Pt remained in supine with HOB elevated during therapy session- EOB activity held 2/2 pt with low blood glucose this AM. Therapist assisted pt with scooting towards HOB with total assistance x 2 and repositioned for pressure relief and comfort.     Therapeutic Activities and Exercises:   Pt re-oriented to place, time and situation at start of therapy session. Pt able to state son's name (Scooter) and state that she was feeling "okay." Pt educated on PT POC. Therapist encouraged active ROM to BLEs, pt unable to perform; PROM to BLEs performed 1x15 reps in all available planes to prevent further joint contractures, increase muscle extensibility and encourage active participate in mobility. Increased tone to BLEs (L>R). Pt performed B should flexion ROM x 10 reps with max A.      AM-PAC 6 CLICK MOBILITY  Turning over in bed (including adjusting bedclothes, sheets and blankets)?: 2  Sitting down on and standing up from a chair with arms (e.g., wheelchair, bedside commode, etc.): 1  Moving from lying on back to sitting on the side of the bed?: 2  Moving to and from a bed to a chair (including a wheelchair)?: 1  Need to walk in hospital room?: 1  Climbing 3-5 steps with a railing?: 1  Total Score: 8     Patient left HOB elevated with all lines intact, call button in reach, bed alarm on and RN present..    GOALS:    Physical Therapy Goals        Problem: Physical Therapy Goal    Goal Priority Disciplines Outcome Goal Variances Interventions   Physical Therapy Goal     PT/OT, PT Ongoing (interventions implemented as appropriate)     Description:  Goals to be met by: 03/23/2018 "     Patient will increase functional independence with mobility by performin. Supine to sit with Moderate Assistance  2. Sit to supine with Moderate Assistance  3. Bed to chair transfer with Maximum Assistance  4. Pt will perform dynamic sitting activity x 5 minutes with supervision, no LOB to improve sitting balance   5. Lower extremity exercise program x15 reps per handout, with assistance as needed                       Time Tracking:     PT Received On: 18  PT Start Time: 824     PT Stop Time: 848  PT Total Time (min): 24 min     Billable Minutes: Therapeutic Activity 10 min and Therapeutic Exercise 14 min    Treatment Type: Treatment  PT/PTA: PT     PTA Visit Number: 0     Alka Rivas PT, DPT   3/16/2018  Pager: [phone]

## 2018-03-16 NOTE — PLAN OF CARE
Problem: Physical Therapy Goal  Goal: Physical Therapy Goal  Goals to be met by: 2018     Patient will increase functional independence with mobility by performin. Supine to sit with Moderate Assistance  2. Sit to supine with Moderate Assistance  3. Bed to chair transfer with Maximum Assistance  4. Pt will perform dynamic sitting activity x 5 minutes with supervision, no LOB to improve sitting balance   5. Lower extremity exercise program x15 reps per handout, with assistance as needed      Outcome: Ongoing (interventions implemented as appropriate)  No goals met this visit; continue current POC.     Alka Rivas PT, DPT   3/16/2018  Pager: [phone]

## 2018-03-16 NOTE — ASSESSMENT & PLAN NOTE
-likely 2/2 to previous stroke  -PT/OT following, recommending SNF, though this is unfortunately not an option for her given their financial situation. Continuing to discuss with case management.

## 2018-03-16 NOTE — PLAN OF CARE
Plan to discharge home with HH unsuccessful. Pt has limited post acute coverage and cannot afford pvt pay NH. Will refer to NH seking acceptance as Medicaid intermediate.      03/16/18 1516   Discharge Reassessment   Assessment Type Discharge Planning Reassessment   Provided patient/caregiver education on the expected discharge date and the discharge plan Yes   Do you have any problems affording any of your prescribed medications? No   Discharge Plan A New Nursing Home placement - intermediate care facility   Discharge Plan B Home with family;Home Health   Can the patient answer the patient profile reliably? No, cognitively impaired   Describe the patient's ability to walk at the present time. Walks with the help of equipment   How often would a person be available to care for the patient? Infrequently   Number of comorbid conditions (as recorded on the chart) Three

## 2018-03-16 NOTE — PLAN OF CARE
Problem: Patient Care Overview  Goal: Plan of Care Review  Outcome: Ongoing (interventions implemented as appropriate)  Pt remains free from injury or fall. Poor oral intake. Refusing to eat. No neuro changes. IV fluids continued. Fall and aspiration precautions applied.will continue to monitor.

## 2018-03-16 NOTE — PROGRESS NOTES
[facility] Hospital Medicine  Progress Note    Patient Name: Lisbeth Seaman  MRN: [id number]  Patient Class: IP- Inpatient   Admission Date: 3/6/2018  Length of Stay: 10 days  Attending Physician: Kanika Aaron MD  Primary Care Provider: Rob Valladares MD    [facility] Medicine Team: [facility] HOSP MED 1 Mika Valiente MD    Subjective:     Principal Problem:Encephalopathy, metabolic    HPI:  Lisbeth Seaman is a 63 y.o. Female with extensive PMH who has pmhx HLD, HTN, vascular dementia and stroke is presenting to [facility] for evaluation failure to thrive since discharge from hospital three days ago. Patient is also reported to have history of NPH and needs neurosurgery evaluation for treatment. Daughter reports that pt has been unable to eat or drink, decrease mobility secondary to weakness, and confusion. She also reports decreased bowel movements however this is likely 2/2 to decreased PO intake as patient does have positive bowel sounds in all 4 quadrants. Family thinks that pt needs to be hospitalized due to declining health since discharge. Family contacted PCP today, who advised pt to go to ED for further evaluation.      Patient non-verbal on physical exam, oriented only to person. Family not at bedside and unable to be reached by phone.     Hospital Course:  03/08 NSGY to evaluate today; Day 2/3 of Abx for presumed UTI, cultures pending. SLP re-evaluation for some difficulties during PO intake   03/09 Doing well. Mental status stable. Completed ceftriaxone this morning with test for clearance pending.   03/10 Awaiting UA/Ucx to confirm bacterial clearance of UTI. No issues overnight; continuing to work on adequate PO intake.   03/11 Repeat UA w/o any signs of infection. Patient stable.  shunt scheduled for Monday. NPO after midnight.  03/12 Patient received BP shunt today. Will D/c Bactrim.   03/13 Patient doing well with no complaints. Her V/P shunt was kinked this morning and settings were  adjusted by neurosurgery. Will monitor and obtain abdominal x-ray tomorrow. Patient encouraged PO diet.   03/14-15 Patient's X-ray abdomen showed kinked V/P which has resolved. She is medically stable for discharge but would like to go to SNF.   03/16 Started patient on Mirtazapine for appetite stimulation. Changed LR to LR with dextose. Awaiting SNF placement.       Interval History:     NAEON    Review of Systems   Unable to perform ROS: Dementia   Constitutional: Negative for fever and unexpected weight change.   HENT: Positive for dental problem.    Respiratory: Negative for cough.    Cardiovascular: Negative for chest pain.   Gastrointestinal: Negative.    Neurological: Positive for weakness.     Objective:     Vital Signs (Most Recent):  Temp: 97.7 °F (36.5 °C) (03/16/18 0121)  Pulse: 92 (03/16/18 0420)  Resp: 16 (03/15/18 2115)  BP: (!) 153/81 (03/16/18 0426)  SpO2: 98 % (03/16/18 0420) Vital Signs (24h Range):  Temp:  [97.7 °F (36.5 °C)-99 °F (37.2 °C)] 97.7 °F (36.5 °C)  Pulse:  [82-99] 92  Resp:  [16-18] 16  SpO2:  [94 %-98 %] 98 %  BP: (147-173)/(81-97) 153/81     Weight: 47.2 kg (104 lb 0.9 oz)  Body mass index is 19.66 kg/m².  No intake or output data in the 24 hours ending 03/16/18 1130   Physical Exam   Constitutional: She appears well-developed. No distress.   HENT:   Head: Normocephalic and atraumatic.   Mouth/Throat: No oropharyngeal exudate.   Eyes: EOM are normal. Pupils are equal, round, and reactive to light. No scleral icterus.   Neck: Normal range of motion. Neck supple.   Cardiovascular: Normal rate, regular rhythm, normal heart sounds and intact distal pulses.    No murmur heard.  Pulmonary/Chest: Effort normal. No respiratory distress. She has no wheezes.   Abdominal: Soft. Bowel sounds are normal. She exhibits no distension.   Musculoskeletal: Normal range of motion. She exhibits no edema.   Lymphadenopathy:     She has no cervical adenopathy.   Neurological: She is alert.   Skin: Skin is  warm. She is not diaphoretic.   Diffuse ecchymoses and purpura    Psychiatric:   Able to respond with some single word answers; pleasant, alert and awake     Vitals reviewed.    Significant Labs:   BMP:   Recent Labs  Lab 03/15/18  0532   GLU 61*      K 4.0      CO2 20*   BUN 7*   CREATININE 0.7   CALCIUM 9.0   MG 1.7     CBC:   Recent Labs  Lab 03/15/18  0532   WBC 8.59   HGB 9.1*   HCT 28.3*        Assessment/Plan:      * Encephalopathy, metabolic    - Stable  - Dx of NPH after a LP on previous admission w/ improvement based on pre- and post- LP PT evaluation. Her mental status remains improved since this intervention.  - NSGY following, appreciate assistance.  -  shunt placement on 3/12.          Failure to thrive in adult    - Patient with poor PO intake   - Started Mirtazapine for appetite stimulation.         Debility    -likely 2/2 to previous stroke  -PT/OT following, recommending SNF, though this is unfortunately not an option for her given their financial situation. Continuing to discuss with case management.         Normal pressure hydrocephalus    - Stable  - Mildly enlarged ventricles on previous scans  - Therapeutic/diagnostic tap w/ 7 cc off on previous admission did render improvement in symptoms; PT pre and post testing confirmed     - NSGY following  - Holding anticoagulation at this time  - V/P shunt placed on 3/12  - Per Neurosurgery, patient will require to sit upright and will reevaluate tomorrow after reconfiguration of her shunt settings.   - Stable. Patient will require follow up outpatient.         Hypophosphatemia    - Following daily and replacing PRN        Hypokalemia    - Following daily and replacing PRN        Hypertension, essential    - Stable  - Will continue to monitor        Vascular dementia without behavioral disturbance    - Stable  - Continue Statin at this time  - Neurosurg following  - Neuro checks q4  - Patient's current medical state is likely 2/2  combination of cebro-vascular disease and NPH component. Her mental status was not affected by the UTI and she remains stable  - Repeat UA w/o any evidence of UTI   - Bactrim d/c          VTE Risk Mitigation         Ordered     enoxaparin injection 40 mg  Daily     Route:  Subcutaneous        03/16/18 0916     Medium Risk of VTE  Once      03/12/18 0934     Place sequential compression device  Until discontinued      03/12/18 0934     Place sequential compression device  Until discontinued      03/06/18 3669        Mika Valiente MD  Department of Hospital Medicine   [facility]

## 2018-03-17 PROCEDURE — 97530 THERAPEUTIC ACTIVITIES: CPT

## 2018-03-17 PROCEDURE — 25000003 PHARM REV CODE 250: Performed by: STUDENT IN AN ORGANIZED HEALTH CARE EDUCATION/TRAINING PROGRAM

## 2018-03-17 PROCEDURE — 25000003 PHARM REV CODE 250: Performed by: INTERNAL MEDICINE

## 2018-03-17 PROCEDURE — 20600001 HC STEP DOWN PRIVATE ROOM

## 2018-03-17 PROCEDURE — 97535 SELF CARE MNGMENT TRAINING: CPT

## 2018-03-17 PROCEDURE — 63600175 PHARM REV CODE 636 W HCPCS: Performed by: STUDENT IN AN ORGANIZED HEALTH CARE EDUCATION/TRAINING PROGRAM

## 2018-03-17 PROCEDURE — 94761 N-INVAS EAR/PLS OXIMETRY MLT: CPT

## 2018-03-17 PROCEDURE — 99231 SBSQ HOSP IP/OBS SF/LOW 25: CPT | Mod: ,,, | Performed by: HOSPITALIST

## 2018-03-17 RX ORDER — LISINOPRIL 10 MG/1
10 TABLET ORAL DAILY
Status: DISCONTINUED | OUTPATIENT
Start: 2018-03-17 | End: 2018-03-22 | Stop reason: HOSPADM

## 2018-03-17 RX ADMIN — LISINOPRIL 10 MG: 10 TABLET ORAL at 08:03

## 2018-03-17 RX ADMIN — ATORVASTATIN CALCIUM 40 MG: 20 TABLET, FILM COATED ORAL at 08:03

## 2018-03-17 RX ADMIN — PANTOPRAZOLE SODIUM 40 MG: 40 TABLET, DELAYED RELEASE ORAL at 08:03

## 2018-03-17 RX ADMIN — MIRTAZAPINE 7.5 MG: 7.5 TABLET ORAL at 11:03

## 2018-03-17 RX ADMIN — ENOXAPARIN SODIUM 40 MG: 100 INJECTION SUBCUTANEOUS at 04:03

## 2018-03-17 NOTE — PLAN OF CARE
Problem: Fall Risk (Adult)  Goal: Identify Related Risk Factors and Signs and Symptoms  Related risk factors and signs and symptoms are identified upon initiation of Human Response Clinical Practice Guideline (CPG)   Outcome: Ongoing (interventions implemented as appropriate)  Fall precautions maintained, call bell within reach, VSS, bed in lowest position, no distress noted, will continue to monitor. Pt barely ate any of breakfast or lunch, plan of care reviewed with pt and son.

## 2018-03-17 NOTE — PROGRESS NOTES
[facility] Hospital Medicine  Progress Note    Patient Name: Lisbeth Seaman  MRN: [id number]  Patient Class: IP- Inpatient   Admission Date: 3/6/2018  Length of Stay: 11 days  Attending Physician: Kanika Aaron MD  Primary Care Provider: Rob Valladares MD    [facility] Medicine Team: [facility] HOSP MED 1 Mika Valiente MD    Subjective:     Principal Problem:Encephalopathy, metabolic    HPI:  Lisbeth Seaman is a 63 y.o. Female with extensive PMH who has pmhx HLD, HTN, vascular dementia and stroke is presenting to [facility] for evaluation failure to thrive since discharge from hospital three days ago. Patient is also reported to have history of NPH and needs neurosurgery evaluation for treatment. Daughter reports that pt has been unable to eat or drink, decrease mobility secondary to weakness, and confusion. She also reports decreased bowel movements however this is likely 2/2 to decreased PO intake as patient does have positive bowel sounds in all 4 quadrants. Family thinks that pt needs to be hospitalized due to declining health since discharge. Family contacted PCP today, who advised pt to go to ED for further evaluation.      Patient non-verbal on physical exam, oriented only to person. Family not at bedside and unable to be reached by phone.     Hospital Course:  03/08 NSGY to evaluate today; Day 2/3 of Abx for presumed UTI, cultures pending. SLP re-evaluation for some difficulties during PO intake   03/09 Doing well. Mental status stable. Completed ceftriaxone this morning with test for clearance pending.   03/10 Awaiting UA/Ucx to confirm bacterial clearance of UTI. No issues overnight; continuing to work on adequate PO intake.   03/11 Repeat UA w/o any signs of infection. Patient stable.  shunt scheduled for Monday. NPO after midnight.  03/12 Patient received BP shunt today. Will D/c Bactrim.   03/13 Patient doing well with no complaints. Her V/P shunt was kinked this morning and settings were  adjusted by neurosurgery. Will monitor and obtain abdominal x-ray tomorrow. Patient encouraged PO diet.   03/14-15 Patient's X-ray abdomen showed kinked V/P which has resolved. She is medically stable for discharge but would like to go to SNF.   03/16 Started patient on Mirtazapine for appetite stimulation. Changed LR to LR with dextose. Awaiting SNF placement.   3/17 NAEON. Her appetite continues to be poor.       Interval History:     NAEON    Review of Systems   Unable to perform ROS: Dementia   Constitutional: Negative for fever and unexpected weight change.   HENT: Positive for dental problem.    Respiratory: Negative for cough.    Cardiovascular: Negative for chest pain.   Gastrointestinal: Negative.    Neurological: Positive for weakness.     Objective:     Vital Signs (Most Recent):  Temp: 98.5 °F (36.9 °C) (03/17/18 1144)  Pulse: 96 (03/17/18 1144)  Resp: 18 (03/17/18 1144)  BP: (!) 140/70 (03/17/18 1157)  SpO2: 97 % (03/17/18 1144) Vital Signs (24h Range):  Temp:  [97.7 °F (36.5 °C)-99 °F (37.2 °C)] 98.5 °F (36.9 °C)  Pulse:  [88-97] 96  Resp:  [16-18] 18  SpO2:  [95 %-97 %] 97 %  BP: (136-176)/() 140/70     Weight: 47.2 kg (104 lb 0.9 oz)  Body mass index is 19.66 kg/m².  No intake or output data in the 24 hours ending 03/17/18 1256   Physical Exam   Constitutional: She appears well-developed. No distress.   HENT:   Head: Normocephalic and atraumatic.   Mouth/Throat: No oropharyngeal exudate.   Eyes: EOM are normal. Pupils are equal, round, and reactive to light. No scleral icterus.   Neck: Normal range of motion. Neck supple.   Cardiovascular: Normal rate, regular rhythm, normal heart sounds and intact distal pulses.    No murmur heard.  Pulmonary/Chest: Effort normal. No respiratory distress. She has no wheezes.   Abdominal: Soft. Bowel sounds are normal. She exhibits no distension.   Musculoskeletal: Normal range of motion. She exhibits no edema.   Lymphadenopathy:     She has no cervical  adenopathy.   Neurological: She is alert.   Skin: Skin is warm. She is not diaphoretic.   Diffuse ecchymoses and purpura    Psychiatric:   Able to respond with some single word answers; pleasant, alert and awake     Vitals reviewed.      Assessment/Plan:      * Encephalopathy, metabolic    - Stable  - Dx of NPH after a LP on previous admission w/ improvement based on pre- and post- LP PT evaluation. Her mental status remains improved since this intervention.  - NSGY following, appreciate assistance.  -  shunt placement on 3/12.          Failure to thrive in adult    - Patient with poor PO intake   - Started Mirtazapine for appetite stimulation.         Debility    -likely 2/2 to previous stroke  -PT/OT following, recommending SNF, though this is unfortunately not an option for her given their financial situation. Continuing to discuss with case management.         Normal pressure hydrocephalus    - Stable  - Mildly enlarged ventricles on previous scans  - Therapeutic/diagnostic tap w/ 7 cc off on previous admission did render improvement in symptoms; PT pre and post testing confirmed     - NSGY following  - Holding anticoagulation at this time  - V/P shunt placed on 3/12  - Per Neurosurgery, patient will require to sit upright and will reevaluate tomorrow after reconfiguration of her shunt settings.   - Stable. Patient will require follow up outpatient.         Hypophosphatemia    - Following daily and replacing PRN        Hypokalemia    - Following daily and replacing PRN        Hypertension, essential    - Stable  - Will continue to monitor        Vascular dementia without behavioral disturbance    - Stable  - Continue Statin at this time  - Neurosurg following  - Neuro checks q4  - Patient's current medical state is likely 2/2 combination of cebro-vascular disease and NPH component. Her mental status was not affected by the UTI and she remains stable  - Repeat UA w/o any evidence of UTI   - Bactrim d/c           VTE Risk Mitigation         Ordered     enoxaparin injection 40 mg  Daily     Route:  Subcutaneous        03/16/18 0916     Medium Risk of VTE  Once      03/12/18 0934     Place sequential compression device  Until discontinued      03/12/18 0934     Place sequential compression device  Until discontinued      03/06/18 2188        Mika Valiente MD  Department of Hospital Medicine   [facility]

## 2018-03-17 NOTE — PLAN OF CARE
Problem: Patient Care Overview  Goal: Plan of Care Review  Outcome: Ongoing (interventions implemented as appropriate)  Patient remains free from injury or fall. Fall and aspirations precautions applied. No neuro changes noted or reported. Poor oral intake noted. Encourage food and drinks per family and nursing staff. IV fluids infusing. Will continue to monitor.

## 2018-03-17 NOTE — SUBJECTIVE & OBJECTIVE
Interval History:     NAEON    Review of Systems   Unable to perform ROS: Dementia   Constitutional: Negative for fever and unexpected weight change.   HENT: Positive for dental problem.    Respiratory: Negative for cough.    Cardiovascular: Negative for chest pain.   Gastrointestinal: Negative.    Neurological: Positive for weakness.     Objective:     Vital Signs (Most Recent):  Temp: 98.5 °F (36.9 °C) (03/17/18 1144)  Pulse: 96 (03/17/18 1144)  Resp: 18 (03/17/18 1144)  BP: (!) 140/70 (03/17/18 1157)  SpO2: 97 % (03/17/18 1144) Vital Signs (24h Range):  Temp:  [97.7 °F (36.5 °C)-99 °F (37.2 °C)] 98.5 °F (36.9 °C)  Pulse:  [88-97] 96  Resp:  [16-18] 18  SpO2:  [95 %-97 %] 97 %  BP: (136-176)/() 140/70     Weight: 47.2 kg (104 lb 0.9 oz)  Body mass index is 19.66 kg/m².  No intake or output data in the 24 hours ending 03/17/18 1256   Physical Exam   Constitutional: She appears well-developed. No distress.   HENT:   Head: Normocephalic and atraumatic.   Mouth/Throat: No oropharyngeal exudate.   Eyes: EOM are normal. Pupils are equal, round, and reactive to light. No scleral icterus.   Neck: Normal range of motion. Neck supple.   Cardiovascular: Normal rate, regular rhythm, normal heart sounds and intact distal pulses.    No murmur heard.  Pulmonary/Chest: Effort normal. No respiratory distress. She has no wheezes.   Abdominal: Soft. Bowel sounds are normal. She exhibits no distension.   Musculoskeletal: Normal range of motion. She exhibits no edema.   Lymphadenopathy:     She has no cervical adenopathy.   Neurological: She is alert.   Skin: Skin is warm. She is not diaphoretic.   Diffuse ecchymoses and purpura    Psychiatric:   Able to respond with some single word answers; pleasant, alert and awake     Vitals reviewed.

## 2018-03-18 LAB
ALBUMIN SERPL BCP-MCNC: 2.4 G/DL
ALP SERPL-CCNC: 94 U/L
ALT SERPL W/O P-5'-P-CCNC: 15 U/L
ANION GAP SERPL CALC-SCNC: 10 MMOL/L
AST SERPL-CCNC: 24 U/L
BASOPHILS # BLD AUTO: 0.06 K/UL
BASOPHILS NFR BLD: 1 %
BILIRUB SERPL-MCNC: 0.7 MG/DL
BUN SERPL-MCNC: 4 MG/DL
CALCIUM SERPL-MCNC: 8.9 MG/DL
CHLORIDE SERPL-SCNC: 107 MMOL/L
CO2 SERPL-SCNC: 27 MMOL/L
CREAT SERPL-MCNC: 0.8 MG/DL
DIFFERENTIAL METHOD: ABNORMAL
EOSINOPHIL # BLD AUTO: 0.1 K/UL
EOSINOPHIL NFR BLD: 1.8 %
ERYTHROCYTE [DISTWIDTH] IN BLOOD BY AUTOMATED COUNT: 16.2 %
EST. GFR  (AFRICAN AMERICAN): >60 ML/MIN/1.73 M^2
EST. GFR  (NON AFRICAN AMERICAN): >60 ML/MIN/1.73 M^2
GLUCOSE SERPL-MCNC: 106 MG/DL
HCT VFR BLD AUTO: 32.6 %
HGB BLD-MCNC: 9.9 G/DL
IMM GRANULOCYTES # BLD AUTO: 0.05 K/UL
IMM GRANULOCYTES NFR BLD AUTO: 0.8 %
LYMPHOCYTES # BLD AUTO: 0.9 K/UL
LYMPHOCYTES NFR BLD: 15 %
MAGNESIUM SERPL-MCNC: 1.5 MG/DL
MCH RBC QN AUTO: 29.1 PG
MCHC RBC AUTO-ENTMCNC: 30.4 G/DL
MCV RBC AUTO: 96 FL
MONOCYTES # BLD AUTO: 0.6 K/UL
MONOCYTES NFR BLD: 9.5 %
NEUTROPHILS # BLD AUTO: 4.4 K/UL
NEUTROPHILS NFR BLD: 71.9 %
NRBC BLD-RTO: 0 /100 WBC
PLATELET # BLD AUTO: 200 K/UL
PMV BLD AUTO: 11 FL
POTASSIUM SERPL-SCNC: 3.1 MMOL/L
PROT SERPL-MCNC: 6 G/DL
RBC # BLD AUTO: 3.4 M/UL
SODIUM SERPL-SCNC: 144 MMOL/L
WBC # BLD AUTO: 6.08 K/UL

## 2018-03-18 PROCEDURE — 83735 ASSAY OF MAGNESIUM: CPT

## 2018-03-18 PROCEDURE — 63600175 PHARM REV CODE 636 W HCPCS: Performed by: STUDENT IN AN ORGANIZED HEALTH CARE EDUCATION/TRAINING PROGRAM

## 2018-03-18 PROCEDURE — 25000003 PHARM REV CODE 250: Performed by: STUDENT IN AN ORGANIZED HEALTH CARE EDUCATION/TRAINING PROGRAM

## 2018-03-18 PROCEDURE — 36415 COLL VENOUS BLD VENIPUNCTURE: CPT

## 2018-03-18 PROCEDURE — 20600001 HC STEP DOWN PRIVATE ROOM

## 2018-03-18 PROCEDURE — 25000003 PHARM REV CODE 250: Performed by: INTERNAL MEDICINE

## 2018-03-18 PROCEDURE — 99231 SBSQ HOSP IP/OBS SF/LOW 25: CPT | Mod: ,,, | Performed by: HOSPITALIST

## 2018-03-18 PROCEDURE — 80053 COMPREHEN METABOLIC PANEL: CPT

## 2018-03-18 PROCEDURE — 97535 SELF CARE MNGMENT TRAINING: CPT

## 2018-03-18 PROCEDURE — 94761 N-INVAS EAR/PLS OXIMETRY MLT: CPT

## 2018-03-18 PROCEDURE — 97530 THERAPEUTIC ACTIVITIES: CPT

## 2018-03-18 PROCEDURE — 85025 COMPLETE CBC W/AUTO DIFF WBC: CPT

## 2018-03-18 RX ORDER — MIRTAZAPINE 15 MG/1
15 TABLET, FILM COATED ORAL NIGHTLY
Status: DISCONTINUED | OUTPATIENT
Start: 2018-03-18 | End: 2018-03-22 | Stop reason: HOSPADM

## 2018-03-18 RX ORDER — LANOLIN ALCOHOL/MO/W.PET/CERES
400 CREAM (GRAM) TOPICAL 2 TIMES DAILY
Status: COMPLETED | OUTPATIENT
Start: 2018-03-18 | End: 2018-03-18

## 2018-03-18 RX ORDER — POTASSIUM CHLORIDE 20 MEQ/15ML
40 SOLUTION ORAL 2 TIMES DAILY
Status: COMPLETED | OUTPATIENT
Start: 2018-03-18 | End: 2018-03-18

## 2018-03-18 RX ADMIN — POTASSIUM CHLORIDE 40 MEQ: 20 SOLUTION ORAL at 09:03

## 2018-03-18 RX ADMIN — MAGNESIUM OXIDE TAB 400 MG (241.3 MG ELEMENTAL MG) 400 MG: 400 (241.3 MG) TAB at 09:03

## 2018-03-18 RX ADMIN — ATORVASTATIN CALCIUM 40 MG: 20 TABLET, FILM COATED ORAL at 10:03

## 2018-03-18 RX ADMIN — ENOXAPARIN SODIUM 40 MG: 100 INJECTION SUBCUTANEOUS at 04:03

## 2018-03-18 RX ADMIN — POTASSIUM CHLORIDE 40 MEQ: 20 SOLUTION ORAL at 10:03

## 2018-03-18 RX ADMIN — LISINOPRIL 10 MG: 10 TABLET ORAL at 10:03

## 2018-03-18 RX ADMIN — MAGNESIUM OXIDE TAB 400 MG (241.3 MG ELEMENTAL MG) 400 MG: 400 (241.3 MG) TAB at 10:03

## 2018-03-18 RX ADMIN — PANTOPRAZOLE SODIUM 40 MG: 40 TABLET, DELAYED RELEASE ORAL at 10:03

## 2018-03-18 RX ADMIN — MIRTAZAPINE 15 MG: 15 TABLET, FILM COATED ORAL at 09:03

## 2018-03-18 RX ADMIN — SODIUM CHLORIDE, SODIUM LACTATE, POTASSIUM CHLORIDE, CALCIUM CHLORIDE, AND DEXTROSE MONOHYDRATE: 600; 310; 30; 20; 5 INJECTION, SOLUTION INTRAVENOUS at 01:03

## 2018-03-18 NOTE — ASSESSMENT & PLAN NOTE
- Patient with poor PO intake   - Started Mirtazapine for appetite stimulation.  - Minimal improvement. Increased dose today 3/18.

## 2018-03-18 NOTE — PLAN OF CARE
Problem: Pressure Ulcer Risk (Martell Scale) (Adult,Obstetrics,Pediatric)  Intervention: Promote/Optimize Nutrition  Patient will progress with improving nutrition, assisted with medication, and attempt to consume calories needed.

## 2018-03-18 NOTE — PLAN OF CARE
Problem: Occupational Therapy Goal  Goal: Occupational Therapy Goal  Goals to be met by: 3/20/17    Patient will increase functional independence with ADLs by performing:    Feeding with Set-up Assistance.  UE Dressing with Minimum Assistance.  Grooming while seated EOB with Set-up assistance and verbal cues for sequencing of task.  Toileting from bedside commode with Moderate Assistance for hygiene and clothing management.   Supine to sit with Moderate Assistance.  Squat Pivot transfer EOB->bedside chair/wheelchair with Max Assistance.  Toilet transfer Squat Pivot to bedside commode with Max Assistance.      Outcome: Ongoing (interventions implemented as appropriate)  OT goals remain appropriate.  Pt progress towards goals is slow/limited.  EZEQUIEL Manuel  3/17/2018

## 2018-03-18 NOTE — SUBJECTIVE & OBJECTIVE
Interval History:     NAEON     Review of Systems   Unable to perform ROS: Dementia   Constitutional: Negative for fever and unexpected weight change.   HENT: Positive for dental problem.    Respiratory: Negative for cough.    Cardiovascular: Negative for chest pain.   Gastrointestinal: Negative.    Neurological: Positive for weakness.     Objective:     Vital Signs (Most Recent):  Temp: 98.8 °F (37.1 °C) (03/18/18 1134)  Pulse: 95 (03/18/18 1134)  Resp: 17 (03/18/18 1134)  BP: (!) 158/97 (03/18/18 1134)  SpO2: (!) 94 % (03/18/18 1134) Vital Signs (24h Range):  Temp:  [97.4 °F (36.3 °C)-98.8 °F (37.1 °C)] 98.8 °F (37.1 °C)  Pulse:  [81-95] 95  Resp:  [14-17] 17  SpO2:  [92 %-99 %] 94 %  BP: (130-161)/(80-97) 158/97     Weight: 47.2 kg (104 lb 0.9 oz)  Body mass index is 19.66 kg/m².  No intake or output data in the 24 hours ending 03/18/18 1240   Physical Exam   Constitutional: She appears well-developed. No distress.   HENT:   Head: Normocephalic and atraumatic.   Mouth/Throat: No oropharyngeal exudate.   Eyes: EOM are normal. Pupils are equal, round, and reactive to light. No scleral icterus.   Neck: Normal range of motion. Neck supple.   Cardiovascular: Normal rate, regular rhythm, normal heart sounds and intact distal pulses.    No murmur heard.  Pulmonary/Chest: Effort normal. No respiratory distress. She has no wheezes.   Abdominal: Soft. Bowel sounds are normal. She exhibits no distension.   Musculoskeletal: Normal range of motion. She exhibits no edema.   Lymphadenopathy:     She has no cervical adenopathy.   Neurological: She is alert.   Skin: Skin is warm. She is not diaphoretic.   Diffuse ecchymoses and purpura    Psychiatric:   Able to respond with some single word answers; pleasant, alert and awake     Vitals reviewed.    Significant Labs:   BMP:   Recent Labs  Lab 03/18/18  0450         K 3.1*      CO2 27   BUN 4*   CREATININE 0.8   CALCIUM 8.9   MG 1.5*     CBC:   Recent Labs  Lab  03/18/18  0450   WBC 6.08   HGB 9.9*   HCT 32.6*

## 2018-03-18 NOTE — PLAN OF CARE
Problem: Fall Risk (Adult)  Goal: Identify Related Risk Factors and Signs and Symptoms  Related risk factors and signs and symptoms are identified upon initiation of Human Response Clinical Practice Guideline (CPG)   Outcome: Ongoing (interventions implemented as appropriate)  Fall precautions maintained call bell within reach, no distress noted, will continue to monitor. Plan of care reviewed with pt and family.

## 2018-03-18 NOTE — PT/OT/SLP PROGRESS
Occupational Therapy   Treatment    Name: Lisbeth Seaman  MRN: [id number]  Admitting Diagnosis:  Encephalopathy, metabolic  6 Days Post-Op    Recommendations:     Discharge Recommendations:  (SNF- long term versus home with 24/7 assistance, lift device, wheelchair, hospital bed)  Discharge Equipment Recommendations:  hospital bed, lift device, wheelchair  Barriers to discharge:  Decreased caregiver support (pt's son's rocío (who is primary caregiver at home) is unable to provide lifting assistance due to back problems)    Subjective   Pt is able to answer questions (she has done so in previous sessions), but chose not to verbally communicate during today's session; pt answered simple questions with gestures and mouthing words despite encouragement to speak    Communicated with: RN prior to session.  Pain/Comfort:  · Pain Rating 1: 0/10  · Pain Rating Post-Intervention 1: 0/10      Objective:     Patient found with: telemetry, peripheral IV    General Precautions: Standard, fall   Orthopedic Precautions:N/A   Braces: N/A     Occupational Performance:    Bed Mobility:    · Patient completed Rolling/Turning to Left with  moderate assistance; noted improved initiation of task during today's session, with pt actively reaching for handrail to assist   · Patient completed Rolling/Turning to Right with moderate assistance; noted improved initiation of task during today's session, with pt actively reaching for handrail to assist     Functional Mobility/Transfers:  · Pt is not appropriate to participate in functional transfers at this time due to increased tone in B ankles/feet (feet are in plantar flexion) preventing ability to achieve adequate dorsiflexion/ankle flexion required for standing/squat pivot transfers     Activities of Daily Living:  · Feeding:  OT encouraged PO intake with pudding/jello; OT loaded small bites onto the spoon for the pt; she was able to hold the spoon in her RUE and brought the food to her mouth,  but then made a face of distaste and did not attempt to pull the food from the spoon into her mouth; the pt refused liquid intake as well despite encouragement; pt reported she had no appetite   · Grooming: pt combed hair using RUE; little motivation noted with task; pt participated for a few seconds then lowered the comb; OT asked if the pt was finished and she nodded yes   · Toileting: pt required total assistance for hygiene from bed level following urinary incontinence; pt was able to indicate to OT that she was wet when prompted     Patient left right sidelying with all lines intact, call button in reach and bed alarm on    AMPAC 6 Click:  [facility] Total Score: 12    Treatment & Education:    OT performed PROM to BUEs/BLEs; pt demonstrates tone/tightness in L trunk (noted during trunk rotations), L cervical region (limiting head turn to R), and LUE; OT unable to stretch bilateral ankles/feet into ankle flexion/dorsiflexion due to tone/limited tissue elasticity     Pt's son was present for session; OT provided education to pt's son regarding current level of assist required for ADL performance (ADLs currently performed at bed level) with pt only able to assist minimally with RUE for simple grooming/feeding tasks (with limited motivation noted); pt's son educated that if he chooses to care for the pt at home, she would require 24/7 care, a lift device, wheelchair, hospital bed, incontinence management, frequent repositioning; pt's son verbalized understanding   Education:    Assessment:     Lisbeth Seaman is a 63 y.o. female with a medical diagnosis of Encephalopathy, metabolic.  Performance deficits affecting function are weakness, impaired endurance, impaired self care skills, impaired functional mobilty, impaired balance, impaired cognition, decreased safety awareness, abnormal tone, decreased upper extremity function, decreased lower extremity function, impaired fine motor.  Limited progress made thus far  towards goals due to limited motivation, generalized weakness, cognitive impairments, and tone in B ankles/feet limiting ability to achieve positioning required for standing/transfers.  Pt did demonstrate improved initiation to assist with bed mobility today. POC decreased to 2x/week due to limited progress with skilled therapy intervention thus far.    Rehab Prognosis:  fair; patient would benefit from acute skilled OT services to address these deficits and reach maximum level of function.       Plan:     Patient to be seen 2 x/week to address the above listed problems via self-care/home management, therapeutic activities, therapeutic exercises, neuromuscular re-education  · Plan of Care Expires: 04/12/18  · Plan of Care Reviewed with: patient, son    This Plan of care has been discussed with the patient who was involved in its development and understands and is in agreement with the identified goals and treatment plan    GOALS:    Occupational Therapy Goals        Problem: Occupational Therapy Goal    Goal Priority Disciplines Outcome Interventions   Occupational Therapy Goal     OT, PT/OT Ongoing (interventions implemented as appropriate)    Description:  Goals to be met by: 3/20/17    Patient will increase functional independence with ADLs by performing:    Feeding with Set-up Assistance.  UE Dressing with Minimum Assistance.  Grooming while seated EOB with Set-up assistance and verbal cues for sequencing of task.  Toileting from bedside commode with Moderate Assistance for hygiene and clothing management.   Supine to sit with Moderate Assistance.  Squat Pivot transfer EOB->bedside chair/wheelchair with Max Assistance.  Toilet transfer Squat Pivot to bedside commode with Max Assistance.                       Time Tracking:     OT Date of Treatment: 03/17/18  OT Start Time: 1326  OT Stop Time: 1423  OT Total Time (min): 57 min    Billable Minutes:Self Care/Home Management 30  Therapeutic Activity 27    Alejo REAL  Andrea, LOTR  3/18/2018

## 2018-03-18 NOTE — PROGRESS NOTES
[facility] Hospital Medicine  Progress Note    Patient Name: Lisbeth Seaman  MRN: [id number]  Patient Class: IP- Inpatient   Admission Date: 3/6/2018  Length of Stay: 12 days  Attending Physician: Kanika Aaron MD  Primary Care Provider: Rob Valladares MD    [facility] 1 Mika Valiente MD    Subjective:     Principal Problem:Encephalopathy, metabolic    HPI:  Lisbeth Seaman is a 63 y.o. Female with extensive PMH who has pmhx HLD, HTN, vascular dementia and stroke is presenting [place] [facility] [place] failure to thrive since discharge from hospital three days ago. Patient is also reported to have history of NPH and needs neurosurgery evaluation for treatment. Daughter reports that pt has been unable to eat or drink, decrease mobility secondary to weakness, and confusion. She also reports decreased bowel movements however this is likely 2/2 to decreased PO intake as patient does have positive bowel sounds in all 4 quadrants. Family thinks that pt needs to be hospitalized due to declining health since discharge. Family contacted PCP today, who advised pt to go to ED for further evaluation.      Patient non-verbal on physical exam, oriented only to person. Family not at bedside and unable to be reached by phone.     Hospital Course:  03/08 NSGY to evaluate today; Day 2/3 of Abx for presumed UTI, cultures pending. SLP re-evaluation for some difficulties during PO intake   03/09 Doing well. Mental status stable. Completed ceftriaxone this morning with test for clearance pending.   03/10 Awaiting UA/Ucx to confirm bacterial clearance of UTI. No issues overnight; continuing to work on adequate PO intake.   03/11 Repeat UA w/o any signs of infection. Patient stable.  shunt scheduled for Monday. NPO after midnight.  03/12 Patient received BP shunt today. Will D/c Bactrim.   03/13 Patient doing well with no complaints. Her V/P shunt was kinked this morning and settings were  adjusted by neurosurgery. Will monitor and obtain abdominal x-ray tomorrow. Patient encouraged PO diet.   03/14-15 Patient's X-ray abdomen showed kinked V/P which has resolved. She is medically stable for discharge but would like to go to SNF.   03/16 Started patient on Mirtazapine for appetite stimulation. Changed LR to LR with dextose. Awaiting SNF placement.   3/17 NAEON. Her appetite continues to be poor.       Interval History:     NAEON     Review of Systems   Unable to perform ROS: Dementia   Constitutional: Negative for fever and unexpected weight change.   HENT: Positive for dental problem.    Respiratory: Negative for cough.    Cardiovascular: Negative for chest pain.   Gastrointestinal: Negative.    Neurological: Positive for weakness.     Objective:     Vital Signs (Most Recent):  Temp: 98.8 °F (37.1 °C) (03/18/18 1134)  Pulse: 95 (03/18/18 1134)  Resp: 17 (03/18/18 1134)  BP: (!) 158/97 (03/18/18 1134)  SpO2: (!) 94 % (03/18/18 1134) Vital Signs (24h Range):  Temp:  [97.4 °F (36.3 °C)-98.8 °F (37.1 °C)] 98.8 °F (37.1 °C)  Pulse:  [81-95] 95  Resp:  [14-17] 17  SpO2:  [92 %-99 %] 94 %  BP: (130-161)/(80-97) 158/97     Weight: 47.2 kg (104 lb 0.9 oz)  Body mass index is 19.66 kg/m².  No intake or output data in the 24 hours ending 03/18/18 1240   Physical Exam   Constitutional: She appears well-developed. No distress.   HENT:   Head: Normocephalic and atraumatic.   Mouth/Throat: No oropharyngeal exudate.   Eyes: EOM are normal. Pupils are equal, round, and reactive to light. No scleral icterus.   Neck: Normal range of motion. Neck supple.   Cardiovascular: Normal rate, regular rhythm, normal heart sounds and intact distal pulses.    No murmur heard.  Pulmonary/Chest: Effort normal. No respiratory distress. She has no wheezes.   Abdominal: Soft. Bowel sounds are normal. She exhibits no distension.   Musculoskeletal: Normal range of motion. She exhibits no edema.   Lymphadenopathy:     She has no cervical  adenopathy.   Neurological: She is alert.   Skin: Skin is warm. She is not diaphoretic.   Diffuse ecchymoses and purpura    Psychiatric:   Able to respond with some single word answers; pleasant, alert and awake     Vitals reviewed.    Significant Labs:   BMP:   Recent Labs  Lab 03/18/18  0450         K 3.1*      CO2 27   BUN 4*   CREATININE 0.8   CALCIUM 8.9   MG 1.5*     CBC:   Recent Labs  Lab 03/18/18  0450   WBC 6.08   HGB 9.9*   HCT 32.6*            Assessment/Plan:      * Encephalopathy, metabolic    - Stable  - Dx of NPH after a LP on previous admission w/ improvement based on pre- and post- LP PT evaluation. Her mental status remains improved since this intervention.  - NSGY following, appreciate assistance.  -  shunt placement on 3/12.          Failure to thrive in adult    - Patient with poor PO intake   - Started Mirtazapine for appetite stimulation.  - Minimal improvement. Increased dose today 3/18.         Debility    -likely 2/2 to previous stroke  -PT/OT following, recommending SNF, though this is unfortunately not an option for her given their financial situation. Continuing to discuss with case management.         Normal pressure hydrocephalus    - Stable  - Mildly enlarged ventricles on previous scans  - Therapeutic/diagnostic tap w/ 7 cc off on previous admission did render improvement in symptoms; PT pre and post testing confirmed     - NSGY following  - Holding anticoagulation at this time  - V/P shunt placed on 3/12  - Per Neurosurgery, patient will require to sit upright and will reevaluate tomorrow after reconfiguration of her shunt settings.   - Stable. Patient will require follow up outpatient.         Hypophosphatemia    - Following daily and replacing PRN        Hypokalemia    - Following daily and replacing PRN        Hypertension, essential    - Stable  - Will continue to monitor        Vascular dementia without behavioral disturbance    - Stable  - Continue  Statin at this time  - Neurosurg following  - Neuro checks q4  - Patient's current medical state is likely 2/2 combination of cebro-vascular disease and NPH component. Her mental status was not affected by the UTI and she remains stable  - Repeat UA w/o any evidence of UTI   - Bactrim d/c          VTE Risk Mitigation         Ordered     enoxaparin injection 40 mg  Daily     Route:  Subcutaneous        03/16/18 0916     Medium Risk of VTE  Once      03/12/18 0934     Place sequential compression device  Until discontinued      03/12/18 0934     Place sequential compression device  Until discontinued      03/06/18 2081        Mika Valiente MD  Department of Hospital Medicine   [facility]

## 2018-03-18 NOTE — NURSING
RNs continue to encourage po intake. Overnight pt able to take a few bites of pudding with her scheduled Remeron, but only after multiple attempts by nurse.  IVF continues, pt voiding spontaneously and adequately.

## 2018-03-19 PROBLEM — Z51.5 PALLIATIVE CARE ENCOUNTER: Status: ACTIVE | Noted: 2018-03-19

## 2018-03-19 PROCEDURE — 94761 N-INVAS EAR/PLS OXIMETRY MLT: CPT

## 2018-03-19 PROCEDURE — 25000003 PHARM REV CODE 250: Performed by: STUDENT IN AN ORGANIZED HEALTH CARE EDUCATION/TRAINING PROGRAM

## 2018-03-19 PROCEDURE — 97530 THERAPEUTIC ACTIVITIES: CPT

## 2018-03-19 PROCEDURE — 63600175 PHARM REV CODE 636 W HCPCS: Performed by: STUDENT IN AN ORGANIZED HEALTH CARE EDUCATION/TRAINING PROGRAM

## 2018-03-19 PROCEDURE — 97803 MED NUTRITION INDIV SUBSEQ: CPT

## 2018-03-19 PROCEDURE — 99223 1ST HOSP IP/OBS HIGH 75: CPT | Mod: ,,, | Performed by: INTERNAL MEDICINE

## 2018-03-19 PROCEDURE — 97110 THERAPEUTIC EXERCISES: CPT

## 2018-03-19 PROCEDURE — 99232 SBSQ HOSP IP/OBS MODERATE 35: CPT | Mod: ,,, | Performed by: HOSPITALIST

## 2018-03-19 PROCEDURE — 20600001 HC STEP DOWN PRIVATE ROOM

## 2018-03-19 PROCEDURE — 25000003 PHARM REV CODE 250: Performed by: INTERNAL MEDICINE

## 2018-03-19 RX ADMIN — MIRTAZAPINE 15 MG: 15 TABLET, FILM COATED ORAL at 10:03

## 2018-03-19 RX ADMIN — ENOXAPARIN SODIUM 40 MG: 100 INJECTION SUBCUTANEOUS at 04:03

## 2018-03-19 RX ADMIN — SODIUM CHLORIDE, SODIUM LACTATE, POTASSIUM CHLORIDE, CALCIUM CHLORIDE, AND DEXTROSE MONOHYDRATE: 600; 310; 30; 20; 5 INJECTION, SOLUTION INTRAVENOUS at 05:03

## 2018-03-19 NOTE — ASSESSMENT & PLAN NOTE
Would discontinue statin if discharging with hospice. Would encourage eating rather than meds (for long term benefit)

## 2018-03-19 NOTE — PROGRESS NOTES
" [facility]  Adult Nutrition  Progress Note    SUMMARY       Recommendations    1. Recommend to advance to Regular diet with texture per SLP evaluation. Pt on Clear Liquid x 1 week with poor intake.   2. Continue Boost Plus once Clear Liquid diet advanced.   3. If diet unable to be advanced per SLP, consider alternative form of nutrition as patient has not met energy or protein needs for > 1 week.     RD to monitor    Goals: Meet >50% EEN/EPN from meals/ ONS  Nutrition Goal Status: new  Communication of RD Recs:  (POC)    Reason for Assessment    Reason for Assessment: RD follow-up  Diagnosis: other (see comments) (Encephalopathy, metabolic)  Relevant Medical History: Stroke, HTN, HLD, dementia    General Information Comments: Pt seen this am, non-verbal with breakfast at bedside untouched. Still on Clear Liquid diet, although RN flowsheet states Full Liquid. No SLP note since 3/7. RN unable to be reached to clarify diet and why diet has not been advanced. Mirtazipine started for appetite stimulant. Pt refusing meals/meds. Feeding assist.     Nutrition Discharge Planning: Regular diet     Nutrition Risk Screen    Nutrition Risk Screen: dysphagia or difficulty swallowing    Nutrition/Diet History    Patient Reported Diet/Restrictions/Preferences: other (see comments) (patricia)  Typical Food/Fluid Intake: Poor intake  Food Preferences: No cultural or Anglican preferences.  Do you have any cultural, spiritual, Anglican conflicts, given your current situation?: no conflicts  Food Allergies: NKFA  Factors Affecting Nutritional Intake: decreased appetite, impaired cognitive status/motor control, difficulty/impaired swallowing    Anthropometrics    Temp: 98.1 °F (36.7 °C)  Height Method: Measured  Height: 5' 1" (154.9 cm)  Height (inches): 61 in  Weight Method: Standard Scale  Weight: 47.2 kg (104 lb 0.9 oz)  Weight (lb): 104.06 lb  Ideal Body Weight (IBW), Female: 105 lb  % Ideal Body Weight, Female " "(lb): 99.1 lb  BMI (Calculated): 19.7  BMI Grade: 18.5-24.9 - normal    Lab/Procedures/Meds    Pertinent Labs Reviewed: reviewed  Pertinent Labs Comments: Mg 1.5, K 3.1, Alb 2.4  Pertinent Medications Reviewed: reviewed  Pertinent Medications Comments: D5, pantoprazole, mirtazipine, Mg, statin, KCl    Physical Findings/Assessment    Overall Physical Appearance: listlessness, weak  Oral/Mouth Cavity: WDL  Skin: incision(s), pressure ulcer(s), dermatitis    Estimated/Assessed Needs    Weight Used For Calorie Calculations: 47.2 kg (104 lb 0.9 oz)  Height: 5' 1" (154.9 cm)  Energy Calorie Requirements (kcal): 4037-5886  Energy Need Method: [place]-St Jeor (PAL 1.25-1.4)  RMR (Cabarrus-St. Jeor Equation): 974.38  Protein Requirements: 57-66g/d (1.2-1.4 g/kg)  Weight Used For Protein Calculations: 47.2 kg (104 lb 0.9 oz)  Fluid Need Method: RDA Method (1 ml/kcal or per MD)  RDA Method (mL): 1217     Nutrition Prescription Ordered    Current Diet Order: Clear Liquid   Oral Nutrition Supplement: Boost Plus with meals     Evaluation of Received Nutrient/Fluid Intake    Other Calories (kcal): 306 (D5)  IV Fluid (mL): 1800  Energy Calories Required: not meeting needs  Protein Required: not meeting needs  Fluid Required: exceed needs  Comments: LBM 3/18    % Intake of Estimated Energy Needs: 0 - 25 %  % Meal Intake: 0 - 25 %    Nutrition Risk    Level of Risk/Frequency of Follow-up:  (f/u 2x week)     Assessment and Plan    * Encephalopathy, metabolic    Nutrition Problem:  Inadequate oral intake    Related to (etiology):   Inability to consume sufficient needs    Signs and Symptoms (as evidenced by):   Pt on clear liquid diet x 1 week with poor intake of <50%     Nutrition Diagnosis Status:   Continues               Monitor and Evaluation    Food and Nutrient Intake: energy intake, food and beverage intake  Food and Nutrient Adminstration: diet order  Knowledge/Beliefs/Attitudes: beliefs and attitudes, food and nutrition " knowledge/skill  Physical Activity and Function: nutrition-related ADLs and IADLs  Anthropometric Measurements: height/length, weight, weight change, body mass index  Biochemical Data, Medical Tests and Procedures: electrolyte and renal panel, gastrointestinal profile, glucose/endocrine profile, inflammatory profile, lipid profile  Nutrition-Focused Physical Findings: overall appearance     Nutrition Follow-Up    RD Follow-up?: Yes

## 2018-03-19 NOTE — PLAN OF CARE
Problem: Fall Risk (Adult)  Intervention: Patient Rounds  Patient will remain free from falls. Bed alarm will remain active, room will remain free from clutter, and patient will alert nurse when assistance is needed.

## 2018-03-19 NOTE — PHYSICIAN QUERY
PT Name: Lisbeth Seaman  MR #: [id number]    Physician Query Form - Nutrition Clarification     CDS: Kelly Lucio RN, CCDS       Contact information: [email or web address]    This form is a permanent document in the medical record.     Query Date: March 19, 2018    By submitting this query, we are merely seeking further clarification of documentation.. Please utilize your independent clinical judgment when addressing the question(s) below.    The Medical record contains the following:   Indicators  Supporting Clinical Findings Location in Medical Record   X % of Estimated Energy Intake over a time frame from p.o., TF, or TPN % Intake of Estimated Energy Needs: 0 - 25 %  % Meal Intake: 0 - 25 % 3/19-RD PN    Weight Status over a time frame      Subcutaneous Fat and/or Muscle Loss      Fluid Accumulation or Edema      Reduced  Strength     X Wt / BMI / Usual Body Weight BMI: 19.7 3/19-RD PN    Delayed Wound Healing / Failure to Thrive     X Acute or Chronic Illness Vascular dementia without behavioral disturbance, HTN, Hypophosphatemia, Hypokalemia, Normal pressure hydrocephalus, Debility, Failure to thrive in adult, Metabolic Encephalopathy 3/18-PN   X Medication Mirtazipine started for appetite stimulant. 3/19-RD PN   X Treatment Recommendations   1. Recommend to advance to Regular diet with texture per SLP evaluation. Pt on Clear Liquid x 1 week with poor intake.   2. Continue Boost Plus once Clear Liquid diet advanced.   3. If diet unable to be advanced per SLP, consider alternative form of nutrition as patient has not met energy or protein needs for > 1 week.  3/19-RD PN   X    X Other  Pt refusing meals/meds.    Failure to thrive in adult  - Patient with poor PO intake   - Started Mirtazapine for appetite stimulation.  - Minimal improvement. Increased dose today 3/18.  3/19-RD PN    3/18-PN     AND / ASPEN Clinical Characteristics (October 2011)  A minimum of two characteristics is recommended for  diagnosing either moderate or severe malnutrition   Mild Malnutrition Moderate Malnutrition Severe Malnutrition   Energy Intake from p.o., TF or TPN. < 75% intake of estimated energy needs for less than 7 days < 75% intake of estimated energy needs for greater than 7 days < 50% intake of estimated energy needs for > 5 days   Weight Loss 1-2% in 1 month  5% in 3 months  7.5% in 6 months  10% in 1 year 1-2 % in 1 week  5% in 1 month  7.5% in 3 months  10% in 6 months  20% in 1 year > 2% in 1 week  > 5% in 1 month  > 7.5% in 3 months  > 10% in 6 months  > 20% in 1 year   Physical Findings     None *Mild subcutaneous fat and/or muscle loss  *Mild fluid accumulation  *Stage II decubitus  *Surgical wound or non-healing wound *Mod/severe subcutaneous fat and/or muscle loss  *Mod/severe fluid accumulation  *Stage III or IV decubitus  *Non-healing surgical wound     Provider, please specify diagnosis or diagnoses associated with above clinical findings.    [ ] Mild Protein-Calorie Malnutrition  [X ] Moderate Protein-Calorie Malnutrition  [ ] Underweight  [ ] Other Nutritional Diagnosis (please specify): ____________________________________  [ ] Other: ________________________________  [ ] Clinically Undetermined    Please document in your progress notes daily for the duration of treatment until resolved and include in your discharge summary.

## 2018-03-19 NOTE — ASSESSMENT & PLAN NOTE
- Patient with poor PO intake   - Started Mirtazapine for appetite stimulation.  - Minimal improvement. Increased dose 3/18.

## 2018-03-19 NOTE — PROGRESS NOTES
[facility] Medicine  Progress Note    Patient Name: Lisbeth Seaman  MRN: [id number]  Patient Class: IP- Inpatient   Admission Date: 3/6/2018  Length of Stay: 13 days  Attending Physician: Kanika Aaron MD  Primary Care Provider: Rob Valladares MD    [facility] Medicine Team: [facility] 1 Rohit Clemens MD    Subjective:     Principal Problem:Encephalopathy, metabolic    HPI:  Lisbeth Seaman is a 63 y.o. Female with extensive PMH who has pmhx HLD, HTN, vascular dementia and stroke is [place] [facility] [place] failure to thrive since discharge from hospital three days ago. Patient is also reported to have history of NPH and needs neurosurgery evaluation for treatment. Daughter reports that pt has been unable to eat or drink, decrease mobility secondary to weakness, and confusion. She also reports decreased bowel movements however this is likely 2/2 to decreased PO intake as patient does have positive bowel sounds in all 4 quadrants. Family thinks that pt needs to be hospitalized due to declining health since discharge. Family contacted PCP today, who advised pt to go to ED for further evaluation.      Patient non-verbal on physical exam, oriented only to person. Family not at bedside and unable to be reached by phone.     Hospital Course:  03/08 NSGY to evaluate today; Day 2/3 of Abx for presumed UTI, cultures pending. SLP re-evaluation for some difficulties during PO intake   03/09 Doing well. Mental status stable. Completed ceftriaxone this morning with test for clearance pending.   03/10 Awaiting UA/Ucx to confirm bacterial clearance of UTI. No issues overnight; continuing to work on adequate PO intake.   03/11 Repeat UA w/o any signs of infection. Patient stable.  shunt scheduled for Monday. NPO after midnight.  03/12 Patient received BP shunt today. Will D/c Bactrim.   03/13 Patient doing well with no complaints. Her V/P shunt was kinked this morning and settings were  adjusted by neurosurgery. Will monitor and obtain abdominal x-ray tomorrow. Patient encouraged PO diet.   03/14-15 Patient's X-ray abdomen showed kinked V/P which has resolved. She is medically stable for discharge but would like to go to SNF.   03/16 Started patient on Mirtazapine for appetite stimulation. Changed LR to LR with dextose. Awaiting SNF placement.   3/17 NAEON. Her appetite continues to be poor.       Interval History:     Ms. Seaman did well overnight. There were no acute events, and she had no new complaints. Today we brought palliative care on board, who had a discussion with Ms. Seaman and her son at which point she was made DNR.    Review of Systems   Constitutional: Positive for activity change. Negative for chills, fatigue and fever.   HENT: Negative for congestion and sore throat.    Eyes: Negative for visual disturbance.   Respiratory: Negative for cough and shortness of breath.    Cardiovascular: Negative for chest pain, palpitations and leg swelling.   Gastrointestinal: Negative for abdominal distention, abdominal pain, constipation, diarrhea, nausea and vomiting.   Endocrine: Negative for polyuria.   Genitourinary: Negative for difficulty urinating and dysuria.   Musculoskeletal: Negative for myalgias.   Skin: Negative for rash and wound.   Allergic/Immunologic: Negative for immunocompromised state.   Neurological: Positive for weakness. Negative for dizziness and numbness.   Hematological: Negative for adenopathy.   Psychiatric/Behavioral: Negative for dysphoric mood. The patient is not nervous/anxious.      Objective:     Vital Signs (Most Recent):  Temp: 98.8 °F (37.1 °C) (03/19/18 1156)  Pulse: 101 (03/19/18 1156)  Resp: 17 (03/19/18 1156)  BP: (!) 161/95 (03/19/18 1215)  SpO2: (!) 93 % (03/19/18 1156) Vital Signs (24h Range):  Temp:  [97.6 °F (36.4 °C)-98.8 °F (37.1 °C)] 98.8 °F (37.1 °C)  Pulse:  [] 101  Resp:  [12-17] 17  SpO2:  [92 %-99 %] 93 %  BP: (135-169)/()  161/95     Weight: 47.2 kg (104 lb 0.9 oz)  Body mass index is 19.66 kg/m².  No intake or output data in the 24 hours ending 03/19/18 1417   Physical Exam   Constitutional: She appears well-developed. No distress.   HENT:   Head: Normocephalic and atraumatic.   Mouth/Throat: No oropharyngeal exudate.   Eyes: EOM are normal. Pupils are equal, round, and reactive to light. No scleral icterus.   Neck: Normal range of motion. Neck supple.   Cardiovascular: Normal rate, regular rhythm, normal heart sounds and intact distal pulses.    No murmur heard.  Pulmonary/Chest: Effort normal. No respiratory distress. She has no wheezes.   Abdominal: Soft. Bowel sounds are normal. She exhibits no distension.   Musculoskeletal: Normal range of motion. She exhibits no edema.   Diffuse weakness, 4/5 in upper extremities bilaterally, 3/5 in lower extremities bilaterally   Lymphadenopathy:     She has no cervical adenopathy.   Neurological: She is alert.   Skin: Skin is warm. She is not diaphoretic.   Diffuse ecchymoses and purpura    Psychiatric:   Able to respond with some single word answers; pleasant, alert and awake     Vitals reviewed.      Significant Labs:     CBC:    Recent Labs  Lab 03/18/18  0450   WBC 6.08   GRAN 71.9  4.4   HGB 9.9*   HCT 32.6*          Chem 10:    Recent Labs  Lab 03/18/18  0450      K 3.1*      CO2 27   BUN 4*   CREATININE 0.8      CALCIUM 8.9   MG 1.5*       LFTs:    Recent Labs  Lab 03/18/18  0450   ALKPHOS 94   BILITOT 0.7   AST 24   ALT 15   ALBUMIN 2.4*       Significant Imaging:   None new    Assessment/Plan:      * Encephalopathy, metabolic    - Stable  - Dx of NPH after a LP on previous admission w/ improvement based on pre- and post- LP PT evaluation. Her mental status remains improved since this intervention   - NSGY following, appreciate assistance.  -  shunt placement on 3/12  - Current mental status likely represents her baseline from her vascular dementia         Palliative care encounter    - Consulted palliative care to assist in further goals of care discussions given her advanced dementia and anorexia  - Now DNR  - Appreciate assistance        Failure to thrive in adult    - Patient with poor PO intake   - Started Mirtazapine for appetite stimulation.  - Minimal improvement. Increased dose 3/18.         Debility    -likely 2/2 to previous stroke  -PT/OT following, recommending SNF, though this is unfortunately not an option for her given their financial situation. Continuing to discuss with case management.         Normal pressure hydrocephalus    - Stable  - Mildly enlarged ventricles on previous scans  - Therapeutic/diagnostic tap w/ 7 cc off on previous admission did render improvement in symptoms; PT pre and post testing confirmed     - NSGY following  - Holding anticoagulation at this time  - V/P shunt placed on 3/12  - Per Neurosurgery, patient will require to sit upright and will reevaluate tomorrow after reconfiguration of her shunt settings.   - Stable. Patient will require follow up outpatient.         Hypophosphatemia    - Following daily and replacing PRN        Hypokalemia    - Following daily and replacing PRN        Hypertension, essential    - Stable  - Will continue to monitor        Vascular dementia without behavioral disturbance    - Stable  - Continue atorvastatin  - Neurosurg following  - Neuro checks q4  - Patient's current medical state is likely 2/2 combination of cebro-vascular disease and NPH component. Her mental status was not affected by the UTI and she remains stable  - Repeat UA w/o any evidence of UTI          VTE Risk Mitigation         Ordered     enoxaparin injection 40 mg  Daily     Route:  Subcutaneous        03/16/18 0916     Medium Risk of VTE  Once      03/12/18 0934     Place sequential compression device  Until discontinued      03/12/18 0934     Place sequential compression device  Until discontinued      03/06/18 7969           Rohit Clemens MD  Department of [facility] Medicine   [facility]

## 2018-03-19 NOTE — ASSESSMENT & PLAN NOTE
- Consulted palliative care to assist in further goals of care discussions given her advanced dementia and anorexia  - Now DNR  - Appreciate assistance

## 2018-03-19 NOTE — ASSESSMENT & PLAN NOTE
Has worked until 8 months ago as a / at [employer]. 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 Her son's girlfriend is working on her long term disability benefits.Requires 24 hour assistance with ADL. She was walking with a limp but is not ambulatory today. She does have an IV in her foot and pressure reduction boots. She is not interested in getting up!

## 2018-03-19 NOTE — SUBJECTIVE & OBJECTIVE
Interval History:     Ms. Seaman did well overnight. There were no acute events, and she had no new complaints. Today we brought palliative care on board, who had a discussion with Ms. Seaman and her son at which point she was made DNR.    Review of Systems   Constitutional: Positive for activity change. Negative for chills, fatigue and fever.   HENT: Negative for congestion and sore throat.    Eyes: Negative for visual disturbance.   Respiratory: Negative for cough and shortness of breath.    Cardiovascular: Negative for chest pain, palpitations and leg swelling.   Gastrointestinal: Negative for abdominal distention, abdominal pain, constipation, diarrhea, nausea and vomiting.   Endocrine: Negative for polyuria.   Genitourinary: Negative for difficulty urinating and dysuria.   Musculoskeletal: Negative for myalgias.   Skin: Negative for rash and wound.   Allergic/Immunologic: Negative for immunocompromised state.   Neurological: Positive for weakness. Negative for dizziness and numbness.   Hematological: Negative for adenopathy.   Psychiatric/Behavioral: Negative for dysphoric mood. The patient is not nervous/anxious.      Objective:     Vital Signs (Most Recent):  Temp: 98.8 °F (37.1 °C) (03/19/18 1156)  Pulse: 101 (03/19/18 1156)  Resp: 17 (03/19/18 1156)  BP: (!) 161/95 (03/19/18 1215)  SpO2: (!) 93 % (03/19/18 1156) Vital Signs (24h Range):  Temp:  [97.6 °F (36.4 °C)-98.8 °F (37.1 °C)] 98.8 °F (37.1 °C)  Pulse:  [] 101  Resp:  [12-17] 17  SpO2:  [92 %-99 %] 93 %  BP: (135-169)/() 161/95     Weight: 47.2 kg (104 lb 0.9 oz)  Body mass index is 19.66 kg/m².  No intake or output data in the 24 hours ending 03/19/18 1417   Physical Exam   Constitutional: She appears well-developed. No distress.   HENT:   Head: Normocephalic and atraumatic.   Mouth/Throat: No oropharyngeal exudate.   Eyes: EOM are normal. Pupils are equal, round, and reactive to light. No scleral icterus.   Neck: Normal range of  motion. Neck supple.   Cardiovascular: Normal rate, regular rhythm, normal heart sounds and intact distal pulses.    No murmur heard.  Pulmonary/Chest: Effort normal. No respiratory distress. She has no wheezes.   Abdominal: Soft. Bowel sounds are normal. She exhibits no distension.   Musculoskeletal: Normal range of motion. She exhibits no edema.   Diffuse weakness, 4/5 in upper extremities bilaterally, 3/5 in lower extremities bilaterally   Lymphadenopathy:     She has no cervical adenopathy.   Neurological: She is alert.   Skin: Skin is warm. She is not diaphoretic.   Diffuse ecchymoses and purpura    Psychiatric:   Able to respond with some single word answers; pleasant, alert and awake     Vitals reviewed.      Significant Labs:     CBC:    Recent Labs  Lab 03/18/18 0450   WBC 6.08   GRAN 71.9  4.4   HGB 9.9*   HCT 32.6*          Chem 10:    Recent Labs  Lab 03/18/18  0450      K 3.1*      CO2 27   BUN 4*   CREATININE 0.8      CALCIUM 8.9   MG 1.5*       LFTs:    Recent Labs  Lab 03/18/18  0450   ALKPHOS 94   BILITOT 0.7   AST 24   ALT 15   ALBUMIN 2.4*       Significant Imaging:   None new

## 2018-03-19 NOTE — NURSING
Current PIV in R foot (clean, intact, infusing) placed on 3/12 prior to  Shunt placement. RNs attempted to rotate, 2 RNs attempted but pt with poor venous access. Will ask oncoming shift if they can attempt before consult placed for additional assistance.

## 2018-03-19 NOTE — PLAN OF CARE
Problem: Physical Therapy Goal  Goal: Physical Therapy Goal  Goals to be met by: 2018     Patient will increase functional independence with mobility by performin. Supine to sit with Moderate Assistance  2. Sit to supine with Moderate Assistance  3. Bed to chair transfer with Maximum Assistance  4. Pt will perform dynamic sitting activity x 5 minutes with supervision, no LOB to improve sitting balance   5. Lower extremity exercise program x15 reps per handout, with assistance as needed      Outcome: Ongoing (interventions implemented as appropriate)  Pt goals remain appropriate.     HALI SHEPPARD, PT  3/19/2018

## 2018-03-19 NOTE — SUBJECTIVE & OBJECTIVE
Interval History: see hpi     Past Medical History:   Diagnosis Date    Hyperlipidemia     Hypertension     Stroke 09/2017    residual deficits are ambulates on her own with a limp, but has limited use of her right arm; emotional lability    Vascular dementia     since 9/2017 stroke       History reviewed. No pertinent surgical history.    Review of patient's allergies indicates:  No Known Allergies    Medications:  Continuous Infusions:   dextrose 5% lactated ringers 75 mL/hr at 03/19/18 0518     Scheduled Meds:   atorvastatin  40 mg Oral Daily    enoxaparin  40 mg Subcutaneous Daily    lisinopril  10 mg Oral Daily    mirtazapine  15 mg Oral QHS    pantoprazole  40 mg Oral Daily     PRN Meds:sodium chloride, acetaminophen, dextrose 50%, dextrose 50%, glucagon (human recombinant), glucose, glucose, hydrocodone-acetaminophen 5-325mg, ondansetron, ramelteon, sodium chloride 0.9%, sodium chloride 0.9%    Family History     None        Social History Main Topics    Smoking status: Former Smoker    Smokeless tobacco: Not on file    Alcohol use 8.4 oz/week     14 Shots of liquor per week    Drug use: No    Sexual activity: Not on file       Review of Systems   Constitutional: Positive for appetite change.   HENT: Negative.    Respiratory: Negative.    Cardiovascular: Negative.    Gastrointestinal: Negative.      Objective:     Vital Signs (Most Recent):  Temp: 98.1 °F (36.7 °C) (03/19/18 0748)  Pulse: 97 (03/19/18 0748)  Resp: 12 (03/18/18 1944)  BP: (!) 169/98 (03/19/18 0748)  SpO2: 95 % (03/19/18 0748) Vital Signs (24h Range):  Temp:  [97.6 °F (36.4 °C)-98.1 °F (36.7 °C)] 98.1 °F (36.7 °C)  Pulse:  [] 97  Resp:  [12-17] 12  SpO2:  [92 %-99 %] 95 %  BP: (135-169)/(88-98) 169/98     Weight: 47.2 kg (104 lb 0.9 oz)  Body mass index is 19.66 kg/m².    Review of Symptoms  Symptom Assessment (ESAS 0-10 scale)   ESAS 0 1 2 3 4 5 6 7 8 9 10   Pain              Dyspnea              Anxiety               Nausea              Depression               Anorexia              Fatigue              Insomnia              Restlessness               Agitation              CAM / Delirium __ --  ___+   Constipation     __ --  ___+   Diarrhea           __ --  ___+  Bowel Management Plan (BMP): No    Comments: not eating    Pain Assessment: Location:none  OME in 24 hours: none  Performance Status: 30    ECOG Performance Status Grade: 4 - Completely disabled    Physical Exam   HENT:   Head: Normocephalic and atraumatic.   Nose: Nose normal.   Eyes: Conjunctivae and EOM are normal. Pupils are equal, round, and reactive to light.   Neck: Normal range of motion. Neck supple.   Hematoma skin right neck   Abdominal: Soft. Bowel sounds are normal.   Neurological: She is alert.   Skin: Skin is warm and dry.   Psychiatric:   Shakes her head appropriately to yes/no questions.     Nursing note and vitals reviewed.      Significant Labs: All pertinent labs within the past 24 hours have been reviewed.  CBC:     Recent Labs  Lab 03/18/18  0450   WBC 6.08   HGB 9.9*   HCT 32.6*   MCV 96        BMP:  No results for input(s): GLU, NA, K, CL, CO2, BUN, CREATININE, CALCIUM, MG in the last 24 hours.  LFT:  Lab Results   Component Value Date    AST 24 03/18/2018    ALKPHOS 94 03/18/2018    BILITOT 0.7 03/18/2018     Albumin:   Albumin   Date Value Ref Range Status   03/18/2018 2.4 (L) 3.5 - 5.2 g/dL Final     Protein:   Total Protein   Date Value Ref Range Status   03/18/2018 6.0 6.0 - 8.4 g/dL Final     Lactic acid:   Lab Results   Component Value Date    LACTATE 1.3 03/06/2018       Significant Imaging: I have reviewed all pertinent imaging results/findings within the past 24 hours.    Advanced Directives::  Living Will: No  LaPOST: No  Do Not Resuscitate Status: Yes  Medical Power of : No    Decision-Making Capacity: Patient answered questions, Family answered questions, Patient unable to communicate due to disease  severity/cognitive impairment    Living Arrangements: Lives alone, [place] [facility] the [place] with her.    Psychosocial/Cultural:  Patient's most important priorities:  Getting home     Patient's biggest concerns/fears:  Getting home    Previous death/end of life care history:    with the care of hospice at home    Patient's goals/hopes:  Getting home    Spiritual:     F- [place] and Belief: Unable to discern    I - Importance:  Unable to discern  .  C - Community:  Unable to discern    A - Address in Care: Unable to discern

## 2018-03-19 NOTE — PT/OT/SLP PROGRESS
Physical Therapy Treatment    Patient Name:  Lisbeth Seaman   MRN:  [id number]    Recommendations:     Discharge Recommendations:  nursing facility, skilled (in NH)   Discharge Equipment Recommendations: hospital bed   Barriers to discharge: Decreased caregiver support, pt will require 24hr assist upon d/c.    Assessment:     Lisbeth Seaman is a 63 y.o. female admitted with a medical diagnosis of Encephalopathy, metabolic.  She presents with the following impairments/functional limitations:  weakness, impaired functional mobilty, impaired cognition, decreased safety awareness, impaired coordination, impaired fine motor, impaired joint extensibility, decreased coordination, pain, impaired endurance, impaired balance, decreased upper extremity function, decreased lower extremity function, abnormal tone, impaired muscle length, decreased ROM. Pt performed bed mobility max A and sat EOB for ~12min with min/CGA, requiring assist with holding head in upright posture and verbal and tactile cues for upright posture. Pt will continue to benefit from skilled PT to improve deficits and increase overall functional mobility.     Rehab Prognosis:  Fair; patient would benefit from acute skilled PT services to address these deficits and reach maximum level of function.      Recent Surgery: Procedure(s) (LRB):   SHUNT W/ NAVIGATION (Right) 7 Days Post-Op    Plan:     During this hospitalization, patient to be seen 3 x/week to address the above listed problems via therapeutic activities, therapeutic exercises, wheelchair management/training, neuromuscular re-education  · Plan of Care Expires:  04/05/18   Plan of Care Reviewed with: patient    Subjective     Communicated with RN prior to session.  Patient found supine in bed upon PT entry to room, agreeable to treatment.      Chief Complaint: grimaced with B LE PROM  Pain/Comfort:  · Pain Rating 1: other (see comments) (pt grimaced with PROM of B LE)    Patients cultural,  spiritual, Faith conflicts given the current situation: no conflicts    Objective:     Patient found with: telemetry, peripheral IV     General Precautions: Standard, fall   Orthopedic Precautions:N/A   Braces: N/A     Functional Mobility:  · Bed Mobility:     · Rolling Left:  maximal assistance  · Rolling Right: maximal assistance  · Scooting: total assistance  · Supine to Sit: maximal assistance  · Sit to Supine: maximal assistance      AM-PAC 6 CLICK MOBILITY  Turning over in bed (including adjusting bedclothes, sheets and blankets)?: 2  Sitting down on and standing up from a chair with arms (e.g., wheelchair, bedside commode, etc.): 1  Moving from lying on back to sitting on the side of the bed?: 2  Moving to and from a bed to a chair (including a wheelchair)?: 1  Need to walk in hospital room?: 1  Climbing 3-5 steps with a railing?: 1  Total Score: 8       Therapeutic Activities and Exercises:  PT applied tone inhibition and static stretch to B LE towards hip and knee flex and ankle DF.  PT applied static stretch to L SCM and other neck musculature for ~30sec hold x3 trials.  Pt sat EOB for ~12min with min/CGA, requiring assist with holding head in upright posture and verbal and tactile cues for upright posture.  Pt positioned with bed in chair position and B UE propped.  Pt educated on holding head in neutral and performing R lat head tilts while seated upright in bed.     Patient left with bed in chair position with all lines intact, call button in reach, bed alarm on and RN notified..    GOALS:    Physical Therapy Goals        Problem: Physical Therapy Goal    Goal Priority Disciplines Outcome Goal Variances Interventions   Physical Therapy Goal     PT/OT, PT Ongoing (interventions implemented as appropriate)     Description:  Goals to be met by: 2018     Patient will increase functional independence with mobility by performin. Supine to sit with Moderate Assistance  2. Sit to supine with  Moderate Assistance  3. Bed to chair transfer with Maximum Assistance  4. Pt will perform dynamic sitting activity x 5 minutes with supervision, no LOB to improve sitting balance   5. Lower extremity exercise program x15 reps per handout, with assistance as needed                        Time Tracking:     PT Received On: 03/19/18  PT Start Time: 1026     PT Stop Time: 1050  PT Total Time (min): 24 min     Billable Minutes: Therapeutic Activity 14 and Therapeutic Exercise 10    Treatment Type: Treatment  PT/PTA: PT     PTA Visit Number: 0     HALI SHEPPARD, PT  03/19/2018

## 2018-03-19 NOTE — CONSULTS
"[facility]  Palliative Medicine  Consult Note    Patient Name: Lisbeth Seaman  MRN: [id number]  Admission Date: 3/6/2018  Hospital Length of Stay: 13 days  Code Status: DNR   Attending Provider: Kanika Aaron MD  Consulting Provider: Ashlie Deutsch MD  Primary Care Physician: Rob Valladares MD  Principal Problem:Encephalopathy, metabolic    Patient information was obtained from patient, relative(s), caregiver / friend, past medical records and ER records.      Inpatient consult to Palliative Care  Consult performed by: ASHLIE DEUTSCH  Consult ordered by: ALF KIRK        Assessment/Plan:     Palliative care encounter    Awaiting arrival of son for further discussion.   Son was in room at 1:15 pm when I returned. He put his phone on speaker and we talked with Scooter's girlfriend John who cared for Lisbeth Seaman's  who  on hospice after stopping HD and had a double lung transplant. Son attempting to feed her and reminding her to swallow. She hold food in her mouth. She was asked about resuscitation and she, her son Scooter and girlfriend John all agreed that she would not want to be resuscitated if her heart should stop. A DNR order will be entered into the chart.  WE then talked about feeding tubes. The patient nodded that she would not want one placed and would pull it out if it was. She wants to "live" but wants to go home and not have a feeding tube.John (son's girlfriend) thinks that bringing her home and having hospice would be the best plan. Scooter will be here this afternoon and will try to feed her her favorite thing-chocolate pudding and chocolate cake. He will be back in the morning and we will discuss further then.        Failure to thrive in adult    She reports that she is not sad but do agree with trial of mirtazapine for depression/appetite.        Normal pressure hydrocephalus    Had  shunt placed with some improvement in her symptoms. Family " hopeful but at this point does not feel that her quality of life will improve further. Left copy of [employer] for education.        Hyperlipidemia    Would discontinue statin if discharging with hospice. Would encourage eating rather than meds (for long term benefit)        Vascular dementia without behavioral disturbance    Has worked until 8 months ago as a / at [employer]. 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 Her son's girlfriend is working on her long term disability benefits.Requires 24 hour assistance with ADL. She was walking with a limp but is not ambulatory today. She does have an IV in her foot and pressure reduction boots. She is not interested in getting up!            Thank you for your consult. I will follow-up with patient. Please contact us if you have any additional questions.    Subjective:     HPI:   Asked to see patient to help delineate goals of care. Son will be here later.  64 y/o WF with PMH of HTN, vascular dementia, and NPH [place] [facility] [place] since hospital discharge three days prior.  Pt improved with therapeutic LP for NPH, and Neurosurgery placed  shunt 3/12.  Pt has improved somewhat in terms of with speech and alertnessness, but less so with mobility.  Mirtazapine started for poor appetite. She is full code.  I was able to ask patient questions and she was able to shake her head yes/no.  I asked if her name was Deven Burger which she said no and [facility] where she shook her head yes. I asked if she was from [place], [place] (no) or [place](yes). I asked if she was hungry and she shook her head no. I asked if she wanted anything and I listed foods such as ice cream, Snickers, orange juice she said no. I asked if she knew what would happen if she did not eat, she shook her head  "yes. I asked if she knew that if she didn't eat she would die, and she shook her head yes. I asked if she was ok with that and she shrugged her shoulders. I asked if she was sad and she said no. She shook her head yes when I asked her about work - [employer], .   Son was in room at 1:15 pm when I returned. He put his phone on speaker and we talked with Scooter's girlfriend John who cared for Lisbeth Seaman's  who  on hospice after stopping HD and had a double lung transplant. Son attempting to feed her and reminding her to swallow. She hold food in her mouth. She was asked about resuscitation and she, her son Scooter and girlfriend John all agreed that she would not want to be resuscitated if her heart should stop. A DNR order will be entered into the chart.  WE then talked about feeding tubes. The patient nodded that she would not want one placed and would pull it out if it was. She wants to "live" but wants to go home and not have a feeding tube.John (son's girlfriend) thinks that bringing her home and having hospice would be the best plan. Scooter will be here this afternoon and will try to feed her her favorite thing-chocolate pudding and chocolate cake. He will be back in the morning and we will discuss further then.    Hospital Course:  No notes on file    Interval History: see hpi     Past Medical History:   Diagnosis Date    Hyperlipidemia     Hypertension     Stroke 2017    residual deficits are ambulates on her own with a limp, but has limited use of her right arm; emotional lability    Vascular dementia     since 2017 stroke       History reviewed. No pertinent surgical history.    Review of patient's allergies indicates:  No Known Allergies    Medications:  Continuous Infusions:   dextrose 5% lactated ringers 75 mL/hr at 18 0518     Scheduled Meds:   atorvastatin  40 mg Oral Daily    enoxaparin  40 mg Subcutaneous Daily    lisinopril  10 mg Oral Daily    " mirtazapine  15 mg Oral QHS    pantoprazole  40 mg Oral Daily     PRN Meds:sodium chloride, acetaminophen, dextrose 50%, dextrose 50%, glucagon (human recombinant), glucose, glucose, hydrocodone-acetaminophen 5-325mg, ondansetron, ramelteon, sodium chloride 0.9%, sodium chloride 0.9%    Family History     None        Social History Main Topics    Smoking status: Former Smoker    Smokeless tobacco: Not on file    Alcohol use 8.4 oz/week     14 Shots of liquor per week    Drug use: No    Sexual activity: Not on file       Review of Systems   Constitutional: Positive for appetite change.   HENT: Negative.    Respiratory: Negative.    Cardiovascular: Negative.    Gastrointestinal: Negative.      Objective:     Vital Signs (Most Recent):  Temp: 98.1 °F (36.7 °C) (03/19/18 0748)  Pulse: 97 (03/19/18 0748)  Resp: 12 (03/18/18 1944)  BP: (!) 169/98 (03/19/18 0748)  SpO2: 95 % (03/19/18 0748) Vital Signs (24h Range):  Temp:  [97.6 °F (36.4 °C)-98.1 °F (36.7 °C)] 98.1 °F (36.7 °C)  Pulse:  [] 97  Resp:  [12-17] 12  SpO2:  [92 %-99 %] 95 %  BP: (135-169)/(88-98) 169/98     Weight: 47.2 kg (104 lb 0.9 oz)  Body mass index is 19.66 kg/m².    Review of Symptoms  Symptom Assessment (ESAS 0-10 scale)   ESAS 0 1 2 3 4 5 6 7 8 9 10   Pain              Dyspnea              Anxiety              Nausea              Depression               Anorexia              Fatigue              Insomnia              Restlessness               Agitation              CAM / Delirium __ --  ___+   Constipation     __ --  ___+   Diarrhea           __ --  ___+  Bowel Management Plan (BMP): No    Comments: not eating    Pain Assessment: Location:none  OME in 24 hours: none  Performance Status: 30    ECOG Performance Status Grade: 4 - Completely disabled    Physical Exam   HENT:   Head: Normocephalic and atraumatic.   Nose: Nose normal.   Eyes: Conjunctivae and EOM are normal. Pupils are equal, round, and reactive to light.   Neck: Normal range  of motion. Neck supple.   Hematoma skin right neck   Abdominal: Soft. Bowel sounds are normal.   Neurological: She is alert.   Skin: Skin is warm and dry.   Psychiatric:   Shakes her head appropriately to yes/no questions.     Nursing note and vitals reviewed.      Significant Labs: All pertinent labs within the past 24 hours have been reviewed.  CBC:     Recent Labs  Lab 18  0450   WBC 6.08   HGB 9.9*   HCT 32.6*   MCV 96        BMP:  No results for input(s): GLU, NA, K, CL, CO2, BUN, CREATININE, CALCIUM, MG in the last 24 hours.  LFT:  Lab Results   Component Value Date    AST 24 2018    ALKPHOS 94 2018    BILITOT 0.7 2018     Albumin:   Albumin   Date Value Ref Range Status   2018 2.4 (L) 3.5 - 5.2 g/dL Final     Protein:   Total Protein   Date Value Ref Range Status   2018 6.0 6.0 - 8.4 g/dL Final     Lactic acid:   Lab Results   Component Value Date    LACTATE 1.3 2018       Significant Imaging: I have reviewed all pertinent imaging results/findings within the past 24 hours.    Advanced Directives::  Living Will: No  LaPOST: No  Do Not Resuscitate Status: Yes  Medical Power of : No    Decision-Making Capacity: Patient answered questions, Family answered questions, Patient unable to communicate due to disease severity/cognitive impairment    Living Arrangements: Lives alone, Lives [place] from [facility], family lives on the property with her.    Psychosocial/Cultural:  Patient's most important priorities:  Getting home     Patient's biggest concerns/fears:  Getting home    Previous death/end of life care history:    with the care of hospice at home    Patient's goals/hopes:  Getting home    Spiritual:     F- [place] and Belief: Unable to discern    I - Importance:  Unable to discern  .  C - Community:  Unable to discern    A - Address in Care: Unable to discern        > 50% of 70 min visit spent in chart review, face to face discussion of goals of  care,  symptom assessment, coordination of care and emotional support.    Ashlie Deutsch MD  Palliative Medicine  [facility]

## 2018-03-19 NOTE — ASSESSMENT & PLAN NOTE
"Awaiting arrival of son for further discussion.   Son was in room at 1:15 pm when I returned. He put his phone on speaker and we talked with Scooter's girlfriend John who cared for Lisbeth Seaman's  who  on hospice after stopping HD and had a double lung transplant. Son attempting to feed her and reminding her to swallow. She hold food in her mouth. She was asked about resuscitation and she, her son Scooter and girlfriend John all agreed that she would not want to be resuscitated if her heart should stop. A DNR order will be entered into the chart.  WE then talked about feeding tubes. The patient nodded that she would not want one placed and would pull it out if it was. She wants to "live" but wants to go home and not have a feeding tube.John (son's girlfriend) thinks that bringing her home and having hospice would be the best plan. Scooter will be here this afternoon and will try to feed her her favorite thing-chocolate pudding and chocolate cake. He will be back in the morning and we will discuss further then.  "

## 2018-03-19 NOTE — NURSING
Notified hospital medicine 1 provider of pt.'s blood pressure. No new orders received. Will cont. To monitor.

## 2018-03-19 NOTE — PLAN OF CARE
Problem: Patient Care Overview  Goal: Plan of Care Review  Recommendations    1. Recommend to advance to Regular diet with texture per SLP evaluation. Pt on Clear Liquid x 1 week with poor intake.   2. Continue Boost Plus once Clear Liquid diet advanced.   3. If diet unable to be advanced per SLP, consider alternative form of nutrition as patient has not met energy or protein needs for > 1 week.     RD to monitor    Goals: Meet >50% EEN/EPN from meals/ ONS  Nutrition Goal Status: new

## 2018-03-19 NOTE — HPI
"Asked to see patient to help delineate goals of care. Son will be here later.  62 y/o WF with PMH of HTN, vascular dementia, and NPH was [place] [facility] [place] to [place] since hospital discharge three days prior.  Pt improved with therapeutic LP for NPH, and Neurosurgery placed  shunt 3/12.  Pt has improved somewhat in terms of with speech and alertnessness, but less so with mobility.  Mirtazapine started for poor appetite. She is full code.  I was able to ask patient questions and she was able to shake her head yes/no.  I asked if her name was Deven Burger which she said no and [place] where she shook her head yes. I asked if she was from [place] (no) or [place](yes). I asked if she was hungry and she shook her head no. I asked if she wanted anything and I listed foods such as ice cream, Snickers, orange juice she said no. I asked if she knew what would happen if she did not eat, she shook her head yes. I asked if she knew that if she didn't eat she would die, and she shook her head yes. I asked if she was ok with that and she shrugged her shoulders. I asked if she was sad and she said no. She shook her head yes when I asked her about work - [employer], .   Son was in room at 1:15 pm when I returned. He put his phone on speaker and we talked with Scooter's girlfriend John who cared for Lisbeth Seaman's  who  on hospice after stopping HD and had a double lung transplant. Son attempting to feed her and reminding her to swallow. She hold food in her mouth. She was asked about resuscitation and she, her son Scooter and girlfriend John all agreed that she would not want to be resuscitated if her heart should stop. A DNR order will be entered into the chart.  WE then talked about feeding tubes. The patient nodded that she would not want one placed and would pull it out if it was. She wants to "live" but wants to go home and not have a feeding tube.John (son's " girlfriend) thinks that bringing her home and having hospice would be the best plan. Scooter will be here this afternoon and will try to feed her her favorite thing-chocolate pudding and chocolate cake. He will be back in the morning and we will discuss further then.

## 2018-03-19 NOTE — ASSESSMENT & PLAN NOTE
- Stable  - Continue atorvastatin  - Neurosurg following  - Neuro checks q4  - Patient's current medical state is likely 2/2 combination of cebro-vascular disease and NPH component. Her mental status was not affected by the UTI and she remains stable  - Repeat UA w/o any evidence of UTI

## 2018-03-19 NOTE — ASSESSMENT & PLAN NOTE
- Stable  - Dx of NPH after a LP on previous admission w/ improvement based on pre- and post- LP PT evaluation. Her mental status remains improved since this intervention   - NSGY following, appreciate assistance.  -  shunt placement on 3/12  - Current mental status likely represents her baseline from her vascular dementia

## 2018-03-19 NOTE — ASSESSMENT & PLAN NOTE
Nutrition Problem:  Inadequate oral intake    Related to (etiology):   Inability to consume sufficient needs    Signs and Symptoms (as evidenced by):   Pt on clear liquid diet x 1 week with poor intake of <50%     Nutrition Diagnosis Status:   Continues

## 2018-03-20 ENCOUNTER — TELEPHONE (OUTPATIENT)
Dept: NEUROSURGERY | Facility: CLINIC | Age: 63
End: 2018-03-20

## 2018-03-20 DIAGNOSIS — G91.2 NPH (NORMAL PRESSURE HYDROCEPHALUS): Primary | ICD-10-CM

## 2018-03-20 PROCEDURE — 99232 SBSQ HOSP IP/OBS MODERATE 35: CPT | Mod: ,,, | Performed by: INTERNAL MEDICINE

## 2018-03-20 PROCEDURE — 25000003 PHARM REV CODE 250: Performed by: STUDENT IN AN ORGANIZED HEALTH CARE EDUCATION/TRAINING PROGRAM

## 2018-03-20 PROCEDURE — 63600175 PHARM REV CODE 636 W HCPCS: Performed by: STUDENT IN AN ORGANIZED HEALTH CARE EDUCATION/TRAINING PROGRAM

## 2018-03-20 PROCEDURE — 20600001 HC STEP DOWN PRIVATE ROOM

## 2018-03-20 PROCEDURE — 25000003 PHARM REV CODE 250: Performed by: INTERNAL MEDICINE

## 2018-03-20 PROCEDURE — 99232 SBSQ HOSP IP/OBS MODERATE 35: CPT | Mod: ,,, | Performed by: HOSPITALIST

## 2018-03-20 RX ORDER — LABETALOL HYDROCHLORIDE 5 MG/ML
5 INJECTION, SOLUTION INTRAVENOUS ONCE
Status: COMPLETED | OUTPATIENT
Start: 2018-03-20 | End: 2018-03-20

## 2018-03-20 RX ORDER — LABETALOL HYDROCHLORIDE 5 MG/ML
10 INJECTION, SOLUTION INTRAVENOUS ONCE
Status: COMPLETED | OUTPATIENT
Start: 2018-03-20 | End: 2018-03-20

## 2018-03-20 RX ADMIN — LABETALOL HYDROCHLORIDE 5 MG: 5 INJECTION, SOLUTION INTRAVENOUS at 04:03

## 2018-03-20 RX ADMIN — ATORVASTATIN CALCIUM 40 MG: 20 TABLET, FILM COATED ORAL at 09:03

## 2018-03-20 RX ADMIN — LISINOPRIL 10 MG: 10 TABLET ORAL at 09:03

## 2018-03-20 RX ADMIN — PANTOPRAZOLE SODIUM 40 MG: 40 TABLET, DELAYED RELEASE ORAL at 09:03

## 2018-03-20 RX ADMIN — ENOXAPARIN SODIUM 40 MG: 100 INJECTION SUBCUTANEOUS at 04:03

## 2018-03-20 RX ADMIN — MIRTAZAPINE 15 MG: 15 TABLET, FILM COATED ORAL at 11:03

## 2018-03-20 RX ADMIN — LABETALOL HYDROCHLORIDE 10 MG: 5 INJECTION, SOLUTION INTRAVENOUS at 11:03

## 2018-03-20 NOTE — ASSESSMENT & PLAN NOTE
"3/20/2018 Discussed further with son, his girlfriend, patient and patient's sister. Still thinking about taking her home with hospice. Patient refuses to eat and refuses feeding tube. See HPI.     3/19/18 Son was in room at 1:15 pm when I returned. He put his phone on speaker and we talked with Scooter's girlfriend John who cared for Lisbeth Seaman's  who  on hospice after stopping HD and had a double lung transplant. Son attempting to feed her and reminding her to swallow. She hold food in her mouth. She was asked about resuscitation and she, her son Scooter and girlfriend John all agreed that she would not want to be resuscitated if her heart should stop. A DNR order will be entered into the chart.  WE then talked about feeding tubes. The patient nodded that she would not want one placed and would pull it out if it was. She wants to "live" but wants to go home and not have a feeding tube.John (son's girlfriend) thinks that bringing her home and having hospice would be the best plan. Scooter will be here this afternoon and will try to feed her her favorite thing-chocolate pudding and chocolate cake. He will be back in the morning and we will discuss further then.  "

## 2018-03-20 NOTE — PLAN OF CARE
Problem: Fall Risk (Adult)  Goal: Identify Related Risk Factors and Signs and Symptoms  Related risk factors and signs and symptoms are identified upon initiation of Human Response Clinical Practice Guideline (CPG)   Outcome: Ongoing (interventions implemented as appropriate)  Fall precautions maintained, call bell within reach, VSS, bed in lowest position, no distress noted.

## 2018-03-20 NOTE — PROGRESS NOTES
"[facility]  Palliative Medicine  Progress Note    Patient Name: Lisbeth Seaman  MRN: [id number]  Admission Date: 3/6/2018  Hospital Length of Stay: 14 days  Code Status: DNR   Attending Provider: Kanika Aaron MD  Consulting Provider: Ashlie Deutsch MD  Primary Care Physician: Rob Valladares MD  Principal Problem:Encephalopathy, metabolic    Patient information was obtained from patient, relative(s), caregiver / friend and past medical records.      Assessment/Plan:     Palliative care encounter    3/20/2018 Discussed further with son, his girlfriend, patient and patient's sister. Still thinking about taking her home with hospice. Patient refuses to eat and refuses feeding tube. See Interval HISTORY     3/19/18 Son was in room at 1:15 pm when I returned. He put his phone on speaker and we talked with Scooter's girlfriend John who cared for Lisbeth Seaman's  who  on hospice after stopping HD and had a double lung transplant. Son attempting to feed her and reminding her to swallow. She hold food in her mouth. She was asked about resuscitation and she, her son Scooter and girlfriend John all agreed that she would not want to be resuscitated if her heart should stop. A DNR order will be entered into the chart.  WE then talked about feeding tubes. The patient nodded that she would not want one placed and would pull it out if it was. She wants to "live" but wants to go home and not have a feeding tube.John (son's girlfriend) thinks that bringing her home and having hospice would be the best plan. Scooter will be here this afternoon and will try to feed her her favorite thing-chocolate pudding and chocolate cake. He will be back in the morning and we will discuss further then.        Failure to thrive in adult    She reports that she is not sad but do agree with trial of mirtazapine for depression/appetite.        Normal pressure hydrocephalus    Had  shunt placed with some " improvement in her symptoms. Family hopeful but at this point does not feel that her quality of life will improve further. Left copy of Hard Choices for education.            I will follow-up with patient. Please contact us if you have any additional questions.    Subjective:     Chief Complaint: No chief complaint on file.      HPI:   Asked to see patient to help delineate goals of care. Son will be here later.  62 y/o WF with PMH of HTN, vascular dementia, and NPH was readmitted to [facility] with failure to thrive since hospital discharge three days prior.  Pt improved with therapeutic LP for NPH, and Neurosurgery placed  shunt 3/12.  Pt has improved somewhat in terms of with speech and alertnessness, but less so with mobility.  Mirtazapine started for poor appetite. She is full code.  I was able to ask patient questions and she was able to shake her head yes/no.  I asked if her name was Deven Burger which she said no and Jurgen where she shook her head yes. I asked if she was from [facility] (no) or [place](yes). I asked if she was hungry and she shook her head no. I asked if she wanted anything and I listed foods such as ice cream, Snickers, orange juice she said no. I asked if she knew what would happen if she did not eat, she shook her head yes. I asked if she knew that if she didn't eat she would die, and she shook her head yes. I asked if she was ok with that and she shrugged her shoulders. I asked if she was sad and she said no. She shook her head yes when I asked her about work - [employer], .   Son was in room at 1:15 pm when I returned. He put his phone on speaker and we talked with Scooter's girlfriend John who cared for Lisbeth Seaman's  who  on hospice after stopping HD and had a double lung transplant. Son attempting to feed her and reminding her to swallow. She hold food in her mouth. She was asked about resuscitation and she, her son Scooter and girlfriend John all  "agreed that she would not want to be resuscitated if her heart should stop. A DNR order will be entered into the chart.  WE then talked about feeding tubes. The patient nodded that she would not want one placed and would pull it out if it was. She wants to "live" but wants to go home and not have a feeding tube.John (son's girlfriend) thinks that bringing her home and having hospice would be the best plan. Scooter will be here this afternoon and will try to feed her her favorite thing-chocolate pudding and chocolate cake. He will be back in the morning and we will discuss further then.    Hospital Course:  No notes on file    Interval History: Family at bedside. Discussed again with patient, son, his girlfriend. Patient shakes her head yes that she wants to stay in the hospital but no that she wants to eat and no to feeding tube. Patient's sister came in the room-wanting patient to talk with her and identify by name people and places. Scooter and John talked of taking her home with hospice and sister (of patient) agreed. Then the sister asked if she could go to rehab (discussed what she thought the patient would do there or benefit from). Discussed with Dr. Aaron.  Medications:  Continuous Infusions:   dextrose 5% lactated ringers 75 mL/hr at 03/19/18 0518     Scheduled Meds:   atorvastatin  40 mg Oral Daily    enoxaparin  40 mg Subcutaneous Daily    lisinopril  10 mg Oral Daily    mirtazapine  15 mg Oral QHS    pantoprazole  40 mg Oral Daily     PRN Meds:sodium chloride, acetaminophen, dextrose 50%, dextrose 50%, glucagon (human recombinant), glucose, glucose, hydrocodone-acetaminophen 5-325mg, ondansetron, ramelteon, sodium chloride 0.9%, sodium chloride 0.9%    Objective:     Vital Signs (Most Recent):  Temp: 98.4 °F (36.9 °C) (03/20/18 1227)  Pulse: 100 (03/20/18 1227)  Resp: 17 (03/20/18 1227)  BP: (!) 179/116 (03/20/18 1227)  SpO2: (!) 92 % (03/20/18 1227) Vital Signs (24h Range):  Temp:  [97.5 °F " (36.4 °C)-98.4 °F (36.9 °C)] 98.4 °F (36.9 °C)  Pulse:  [] 100  Resp:  [] 17  SpO2:  [92 %-98 %] 92 %  BP: (163-183)/(104-117) 179/116     Weight: 47.2 kg (104 lb 0.9 oz)  Body mass index is 19.66 kg/m².    Review of Symptoms  Symptom Assessment (ESAS 0-10 scale)  ESAS 0 1 2 3 4 5 6 7 8 9 10   Pain x             Dyspnea x             Anxiety x             Nausea x             Depression  ?             Anorexia      x        Fatigue x             Insomnia x             Restlessness  x             Agitation x             CAM / Delirium _x_ --  ___+   Constipation     _x_ --  ___+   Diarrhea           _x_ --  ___+  Bowel Management Plan (BMP): Yes    Comments:     Pain Assessment: No pain reported.    OME in 24 hours: 0  Performance Status: 30    ECOG Performance Status Grade: 4 - Completely disabled    Physical Exam   HENT:   Head: Normocephalic.   Eyes: Conjunctivae and EOM are normal. Pupils are equal, round, and reactive to light.   Pulmonary/Chest: Effort normal.   Abdominal: Soft. Bowel sounds are normal.   Neurological: She is alert.   Skin: Skin is warm.   Nursing note and vitals reviewed.      Significant Labs: All pertinent labs within the past 24 hours have been reviewed.  CBC:     Recent Labs  Lab 03/18/18  0450   WBC 6.08   HGB 9.9*   HCT 32.6*   MCV 96        BMP:  No results for input(s): GLU, NA, K, CL, CO2, BUN, CREATININE, CALCIUM, MG in the last 24 hours.  LFT:  Lab Results   Component Value Date    AST 24 03/18/2018    ALKPHOS 94 03/18/2018    BILITOT 0.7 03/18/2018     Albumin:   Albumin   Date Value Ref Range Status   03/18/2018 2.4 (L) 3.5 - 5.2 g/dL Final     Protein:   Total Protein   Date Value Ref Range Status   03/18/2018 6.0 6.0 - 8.4 g/dL Final     Lactic acid:   Lab Results   Component Value Date    LACTATE 1.3 03/06/2018       Significant Imaging: I have reviewed all pertinent imaging results/findings within the past 24 hours.    Advanced Directives::  Living Will:  No  LaPOST: No  Do Not Resuscitate Status: Yes  Medical Power of : No    Decision-Making Capacity: Patient answered questions, Family answered questions, Patient unable to communicate due to disease severity/cognitive impairment    Living Arrangements: Lives alone, Lives in home, Lives >50 miles from [facility], son and girlfriend live in front of patient's home.    Psychosocial/Cultural:  Patient's most important priorities:  Unable to discern    Patient's biggest concerns/fears:  Unable to discern    Previous death/end of life care history:  Unable to discern    Patient's goals/hopes:  Unable to discern    Spiritual:     F- Miryam and Belief:Unable to discern  I - Importance:Unable to discern.  C - Community: Unable to discern  A - Address in Care: Unable to discern      > 50% of 30 min visit spent in chart review, face to face discussion of goals of care,  symptom assessment, coordination of care and emotional support.    Ashlie Deutsch MD  Palliative Medicine  [facility]

## 2018-03-20 NOTE — SUBJECTIVE & OBJECTIVE
Interval History: Family at bedside. Discussed again with patient, son, his girlfriend. Patient shakes her head yes that she wants to stay in the hospital but no that she wants to eat and no to feeding tube. Patient's sister came in the room-wanting patient to talk with her and identify by name people and places. Scooter and John talked of taking her home with hospice and sister (of patient) agreed. Then the sister asked if she could go to rehab (discussed what she thought the patient would do there or benefit from). Discussed with Dr. Aaron.  Medications:  Continuous Infusions:   dextrose 5% lactated ringers 75 mL/hr at 03/19/18 0518     Scheduled Meds:   atorvastatin  40 mg Oral Daily    enoxaparin  40 mg Subcutaneous Daily    lisinopril  10 mg Oral Daily    mirtazapine  15 mg Oral QHS    pantoprazole  40 mg Oral Daily     PRN Meds:sodium chloride, acetaminophen, dextrose 50%, dextrose 50%, glucagon (human recombinant), glucose, glucose, hydrocodone-acetaminophen 5-325mg, ondansetron, ramelteon, sodium chloride 0.9%, sodium chloride 0.9%    Objective:     Vital Signs (Most Recent):  Temp: 98.4 °F (36.9 °C) (03/20/18 1227)  Pulse: 100 (03/20/18 1227)  Resp: 17 (03/20/18 1227)  BP: (!) 179/116 (03/20/18 1227)  SpO2: (!) 92 % (03/20/18 1227) Vital Signs (24h Range):  Temp:  [97.5 °F (36.4 °C)-98.4 °F (36.9 °C)] 98.4 °F (36.9 °C)  Pulse:  [] 100  Resp:  [] 17  SpO2:  [92 %-98 %] 92 %  BP: (163-183)/(104-117) 179/116     Weight: 47.2 kg (104 lb 0.9 oz)  Body mass index is 19.66 kg/m².    Review of Symptoms  Symptom Assessment (ESAS 0-10 scale)  ESAS 0 1 2 3 4 5 6 7 8 9 10   Pain x             Dyspnea x             Anxiety x             Nausea x             Depression  ?             Anorexia      x        Fatigue x             Insomnia x             Restlessness  x             Agitation x             CAM / Delirium _x_ --  ___+   Constipation     _x_ --  ___+   Diarrhea           _x_ --  ___+  Bowel  Management Plan (BMP): Yes    Comments:     Pain Assessment: No pain reported.    OME in 24 hours: 0  Performance Status: 30    ECOG Performance Status Grade: 4 - Completely disabled    Physical Exam   HENT:   Head: Normocephalic.   Eyes: Conjunctivae and EOM are normal. Pupils are equal, round, and reactive to light.   Pulmonary/Chest: Effort normal.   Abdominal: Soft. Bowel sounds are normal.   Neurological: She is alert.   Skin: Skin is warm.   Nursing note and vitals reviewed.      Significant Labs: All pertinent labs within the past 24 hours have been reviewed.  CBC:     Recent Labs  Lab 03/18/18  0450   WBC 6.08   HGB 9.9*   HCT 32.6*   MCV 96        BMP:  No results for input(s): GLU, NA, K, CL, CO2, BUN, CREATININE, CALCIUM, MG in the last 24 hours.  LFT:  Lab Results   Component Value Date    AST 24 03/18/2018    ALKPHOS 94 03/18/2018    BILITOT 0.7 03/18/2018     Albumin:   Albumin   Date Value Ref Range Status   03/18/2018 2.4 (L) 3.5 - 5.2 g/dL Final     Protein:   Total Protein   Date Value Ref Range Status   03/18/2018 6.0 6.0 - 8.4 g/dL Final     Lactic acid:   Lab Results   Component Value Date    LACTATE 1.3 03/06/2018       Significant Imaging: I have reviewed all pertinent imaging results/findings within the past 24 hours.    Advanced Directives::  Living Will: No  LaPOST: No  Do Not Resuscitate Status: Yes  Medical Power of : No    Decision-Making Capacity: Patient answered questions, Family answered questions, Patient unable to communicate due to disease severity/cognitive impairment    Living Arrangements: Lives alone, Lives in home, Lives [place]50 miles from [facility], son and girlfriend live in front of patient's home.    Psychosocial/Cultural:  Patient's most important priorities:  Unable to discern    Patient's biggest concerns/fears:  Unable to discern    Previous death/end of life care history:  Unable to discern    Patient's goals/hopes:  Unable to discern    Spiritual:     F- Miryam  and Belief:Unable to discern  I - Importance:Unable to discern.  C - Community: Unable to discern  A - Address in Care: Unable to discern

## 2018-03-20 NOTE — ASSESSMENT & PLAN NOTE
Had  shunt placed with some improvement in her symptoms. Family hopeful but at this point does not feel that her quality of life will improve further. Left copy of Hard Choices for education.

## 2018-03-20 NOTE — PLAN OF CARE
SW spoke with pt's daughter in law and was told that they would like to use [facility] in [place].  Phone--[phone] and fax is [phone].  BULMARO will send referral once the orders are put in.        Zohreh Maya LMSW

## 2018-03-20 NOTE — SUBJECTIVE & OBJECTIVE
Interval History:     NAEON    Review of Systems   Constitutional: Positive for activity change. Negative for chills, fatigue and fever.   HENT: Negative for congestion and sore throat.    Eyes: Negative for visual disturbance.   Respiratory: Negative for cough and shortness of breath.    Cardiovascular: Negative for chest pain, palpitations and leg swelling.   Gastrointestinal: Negative for abdominal distention, abdominal pain, constipation, diarrhea, nausea and vomiting.   Endocrine: Negative for polyuria.   Genitourinary: Negative for difficulty urinating and dysuria.   Musculoskeletal: Negative for myalgias.   Skin: Negative for rash and wound.   Allergic/Immunologic: Negative for immunocompromised state.   Neurological: Positive for weakness. Negative for dizziness and numbness.   Hematological: Negative for adenopathy.   Psychiatric/Behavioral: Negative for dysphoric mood. The patient is not nervous/anxious.      Objective:     Vital Signs (Most Recent):  Temp: 98.4 °F (36.9 °C) (03/20/18 1227)  Pulse: 100 (03/20/18 1227)  Resp: 17 (03/20/18 1227)  BP: (!) 179/116 (03/20/18 1227)  SpO2: (!) 92 % (03/20/18 1227) Vital Signs (24h Range):  Temp:  [97.5 °F (36.4 °C)-98.4 °F (36.9 °C)] 98.4 °F (36.9 °C)  Pulse:  [] 100  Resp:  [] 17  SpO2:  [92 %-98 %] 92 %  BP: (163-183)/(104-117) 179/116     Weight: 47.2 kg (104 lb 0.9 oz)  Body mass index is 19.66 kg/m².  No intake or output data in the 24 hours ending 03/20/18 1321   Physical Exam   Constitutional: She appears well-developed. No distress.   HENT:   Head: Normocephalic and atraumatic.   Mouth/Throat: No oropharyngeal exudate.   Eyes: EOM are normal. Pupils are equal, round, and reactive to light. No scleral icterus.   Neck: Normal range of motion. Neck supple.   Cardiovascular: Normal rate, regular rhythm, normal heart sounds and intact distal pulses.    No murmur heard.  Pulmonary/Chest: Effort normal. No respiratory distress. She has no wheezes.    Abdominal: Soft. Bowel sounds are normal. She exhibits no distension.   Musculoskeletal: Normal range of motion. She exhibits no edema.   Diffuse weakness, 4/5 in upper extremities bilaterally, 3/5 in lower extremities bilaterally   Lymphadenopathy:     She has no cervical adenopathy.   Neurological: She is alert.   Skin: Skin is warm. She is not diaphoretic.   Diffuse ecchymoses and purpura    Psychiatric:   Able to respond with some single word answers; pleasant, alert and awake     Vitals reviewed.

## 2018-03-20 NOTE — PROGRESS NOTES
[facility] Hospital Medicine  Progress Note    Patient Name: Lisbeth Seaman  MRN: [id number]  Patient Class: IP- Inpatient   Admission Date: 3/6/2018  Length of Stay: 14 days  Attending Physician: Kanika Aaron MD  Primary Care Provider: Rob Valladares MD    [facility] Medicine Team: [facility] [place] [place] 1 Mika Valiente MD    Subjective:     Principal Problem:Encephalopathy, metabolic    HPI:  Lisbeth Seaman is a 63 y.o. Female with extensive PMH who has pmhx HLD, HTN, vascular dementia and stroke is [place] [facility] [place] failure to thrive since discharge from hospital three days ago. Patient is also reported to have history of NPH and needs neurosurgery evaluation for treatment. Daughter reports that pt has been unable to eat or drink, decrease mobility secondary to weakness, and confusion. She also reports decreased bowel movements however this is likely 2/2 to decreased PO intake as patient does have positive bowel sounds in all 4 quadrants. Family thinks that pt needs to be hospitalized due to declining health since discharge. Family contacted PCP today, who advised pt to go to ED for further evaluation.      Patient non-verbal on physical exam, oriented only to person. Family not at bedside and unable to be reached by phone.     Hospital Course:  03/08 NSGY to evaluate today; Day 2/3 of Abx for presumed UTI, cultures pending. SLP re-evaluation for some difficulties during PO intake   03/09 Doing well. Mental status stable. Completed ceftriaxone this morning with test for clearance pending.   03/10 Awaiting UA/Ucx to confirm bacterial clearance of UTI. No issues overnight; continuing to work on adequate PO intake.   03/11 Repeat UA w/o any signs of infection. Patient stable.  shunt scheduled for Monday. NPO after midnight.  03/12 Patient received BP shunt today. Will D/c Bactrim.   03/13 Patient doing well with no complaints. Her V/P shunt was kinked this morning and settings were  adjusted by neurosurgery. Will monitor and obtain abdominal x-ray tomorrow. Patient encouraged PO diet.   03/14-15 Patient's X-ray abdomen showed kinked V/P which has resolved. She is medically stable for discharge but would like to go to SNF.   03/16 Started patient on Mirtazapine for appetite stimulation. Changed LR to LR with dextose. Awaiting SNF placement.   3/17 NAEON. Her appetite continues to be poor.   3/17-3/20: Discussed with family and patient what her wishes are and she expressed that she would like to go home. Her family agreed that home hospice would be the best option for her at this time. Palliative care has been working with the family and discussed their wishes. Patient continues to have poor PO intake despite starting appetite stimulants. She otherwise has no complaints.     Interval History:     NAEON    Review of Systems   Constitutional: Positive for activity change. Negative for chills, fatigue and fever.   HENT: Negative for congestion and sore throat.    Eyes: Negative for visual disturbance.   Respiratory: Negative for cough and shortness of breath.    Cardiovascular: Negative for chest pain, palpitations and leg swelling.   Gastrointestinal: Negative for abdominal distention, abdominal pain, constipation, diarrhea, nausea and vomiting.   Endocrine: Negative for polyuria.   Genitourinary: Negative for difficulty urinating and dysuria.   Musculoskeletal: Negative for myalgias.   Skin: Negative for rash and wound.   Allergic/Immunologic: Negative for immunocompromised state.   Neurological: Positive for weakness. Negative for dizziness and numbness.   Hematological: Negative for adenopathy.   Psychiatric/Behavioral: Negative for dysphoric mood. The patient is not nervous/anxious.      Objective:     Vital Signs (Most Recent):  Temp: 98.4 °F (36.9 °C) (03/20/18 1227)  Pulse: 100 (03/20/18 1227)  Resp: 17 (03/20/18 1227)  BP: (!) 179/116 (03/20/18 1227)  SpO2: (!) 92 % (03/20/18 1227) Vital  Signs (24h Range):  Temp:  [97.5 °F (36.4 °C)-98.4 °F (36.9 °C)] 98.4 °F (36.9 °C)  Pulse:  [] 100  Resp:  [] 17  SpO2:  [92 %-98 %] 92 %  BP: (163-183)/(104-117) 179/116     Weight: 47.2 kg (104 lb 0.9 oz)  Body mass index is 19.66 kg/m².  No intake or output data in the 24 hours ending 03/20/18 1321   Physical Exam   Constitutional: She appears well-developed. No distress.   HENT:   Head: Normocephalic and atraumatic.   Mouth/Throat: No oropharyngeal exudate.   Eyes: EOM are normal. Pupils are equal, round, and reactive to light. No scleral icterus.   Neck: Normal range of motion. Neck supple.   Cardiovascular: Normal rate, regular rhythm, normal heart sounds and intact distal pulses.    No murmur heard.  Pulmonary/Chest: Effort normal. No respiratory distress. She has no wheezes.   Abdominal: Soft. Bowel sounds are normal. She exhibits no distension.   Musculoskeletal: Normal range of motion. She exhibits no edema.   Diffuse weakness, 4/5 in upper extremities bilaterally, 3/5 in lower extremities bilaterally   Lymphadenopathy:     She has no cervical adenopathy.   Neurological: She is alert.   Skin: Skin is warm. She is not diaphoretic.   Diffuse ecchymoses and purpura    Psychiatric:   Able to respond with some single word answers; pleasant, alert and awake     Vitals reviewed.        Assessment/Plan:      * Encephalopathy, metabolic    - Stable  - Dx of NPH after a LP on previous admission w/ improvement based on pre- and post- LP PT evaluation. Her mental status remains improved since this intervention   - NSGY following, appreciate assistance.  -  shunt placement on 3/12  - Current mental status likely represents her baseline from her vascular dementia        Palliative care encounter    - Consulted palliative care to assist in further goals of care discussions given her advanced dementia and anorexia  - Now DNR  - Palliative care had extensive discussion with family and seem to agree home  hospice has the best option at this time.   - Patient expressed that she would like to go home. Will continue to follow with Palliative care's recommendations.         Failure to thrive in adult    - Patient with poor PO intake   - Started Mirtazapine for appetite stimulation.  - Minimal improvement. Increased dose 3/18.         Debility    -likely 2/2 to previous stroke  -PT/OT following, recommending SNF, though this is unfortunately not an option for her given their financial situation. Continuing to discuss with case management.         Normal pressure hydrocephalus    - Stable  - Mildly enlarged ventricles on previous scans  - Therapeutic/diagnostic tap w/ 7 cc off on previous admission did render improvement in symptoms; PT pre and post testing confirmed     - NSGY following  - Holding anticoagulation at this time  - V/P shunt placed on 3/12  - Per Neurosurgery, patient will require to sit upright and will reevaluate tomorrow after reconfiguration of her shunt settings.   - Stable. Patient will require follow up outpatient.         Hypophosphatemia    - Following daily and replacing PRN        Hypokalemia    - Following daily and replacing PRN        Hypertension, essential    - Stable  - Will continue to monitor        Vascular dementia without behavioral disturbance    - Stable  - Continue atorvastatin  - Neurosurg following  - Neuro checks q4  - Patient's current medical state is likely 2/2 combination of cebro-vascular disease and NPH component. Her mental status was not affected by the UTI and she remains stable  - Repeat UA w/o any evidence of UTI          VTE Risk Mitigation         Ordered     enoxaparin injection 40 mg  Daily     Route:  Subcutaneous        03/16/18 0916     Medium Risk of VTE  Once      03/12/18 0934     Place sequential compression device  Until discontinued      03/12/18 0934     Place sequential compression device  Until discontinued      03/06/18 1050          Mika Valiente  MD  Department of Hospital Medicine   [facility]

## 2018-03-20 NOTE — ASSESSMENT & PLAN NOTE
- Consulted palliative care to assist in further goals of care discussions given her advanced dementia and anorexia  - Now DNR  - Palliative care had extensive discussion with family and seem to agree home hospice has the best option at this time.   - Patient expressed that she would like to go home. Will continue to follow with Palliative care's recommendations.

## 2018-03-21 LAB
ALBUMIN SERPL BCP-MCNC: 2.1 G/DL
ALP SERPL-CCNC: 103 U/L
ALT SERPL W/O P-5'-P-CCNC: 21 U/L
ANION GAP SERPL CALC-SCNC: 10 MMOL/L
AST SERPL-CCNC: 28 U/L
BASOPHILS # BLD AUTO: 0.06 K/UL
BASOPHILS NFR BLD: 0.8 %
BILIRUB SERPL-MCNC: 0.5 MG/DL
BUN SERPL-MCNC: 5 MG/DL
CALCIUM SERPL-MCNC: 9 MG/DL
CHLORIDE SERPL-SCNC: 110 MMOL/L
CO2 SERPL-SCNC: 24 MMOL/L
CREAT SERPL-MCNC: 0.9 MG/DL
DIFFERENTIAL METHOD: ABNORMAL
EOSINOPHIL # BLD AUTO: 0.1 K/UL
EOSINOPHIL NFR BLD: 1.1 %
ERYTHROCYTE [DISTWIDTH] IN BLOOD BY AUTOMATED COUNT: 16.2 %
EST. GFR  (AFRICAN AMERICAN): >60 ML/MIN/1.73 M^2
EST. GFR  (NON AFRICAN AMERICAN): >60 ML/MIN/1.73 M^2
GLUCOSE SERPL-MCNC: 100 MG/DL
HCT VFR BLD AUTO: 32.1 %
HGB BLD-MCNC: 9.5 G/DL
IMM GRANULOCYTES # BLD AUTO: 0.07 K/UL
IMM GRANULOCYTES NFR BLD AUTO: 1 %
LYMPHOCYTES # BLD AUTO: 1 K/UL
LYMPHOCYTES NFR BLD: 13.9 %
MAGNESIUM SERPL-MCNC: 1.4 MG/DL
MCH RBC QN AUTO: 29.1 PG
MCHC RBC AUTO-ENTMCNC: 29.6 G/DL
MCV RBC AUTO: 98 FL
MONOCYTES # BLD AUTO: 0.8 K/UL
MONOCYTES NFR BLD: 11.5 %
NEUTROPHILS # BLD AUTO: 5.3 K/UL
NEUTROPHILS NFR BLD: 71.7 %
NRBC BLD-RTO: 0 /100 WBC
PLATELET # BLD AUTO: 237 K/UL
PMV BLD AUTO: 9.9 FL
POTASSIUM SERPL-SCNC: 4.2 MMOL/L
PROT SERPL-MCNC: 5.5 G/DL
RBC # BLD AUTO: 3.27 M/UL
SODIUM SERPL-SCNC: 144 MMOL/L
WBC # BLD AUTO: 7.33 K/UL

## 2018-03-21 PROCEDURE — 94761 N-INVAS EAR/PLS OXIMETRY MLT: CPT

## 2018-03-21 PROCEDURE — 99231 SBSQ HOSP IP/OBS SF/LOW 25: CPT | Mod: ,,, | Performed by: INTERNAL MEDICINE

## 2018-03-21 PROCEDURE — 20600001 HC STEP DOWN PRIVATE ROOM

## 2018-03-21 PROCEDURE — 85025 COMPLETE CBC W/AUTO DIFF WBC: CPT

## 2018-03-21 PROCEDURE — 63600175 PHARM REV CODE 636 W HCPCS: Performed by: STUDENT IN AN ORGANIZED HEALTH CARE EDUCATION/TRAINING PROGRAM

## 2018-03-21 PROCEDURE — 36415 COLL VENOUS BLD VENIPUNCTURE: CPT

## 2018-03-21 PROCEDURE — 25000003 PHARM REV CODE 250: Performed by: STUDENT IN AN ORGANIZED HEALTH CARE EDUCATION/TRAINING PROGRAM

## 2018-03-21 PROCEDURE — 80053 COMPREHEN METABOLIC PANEL: CPT

## 2018-03-21 PROCEDURE — 99900035 HC TECH TIME PER 15 MIN (STAT)

## 2018-03-21 PROCEDURE — 97530 THERAPEUTIC ACTIVITIES: CPT

## 2018-03-21 PROCEDURE — 25000003 PHARM REV CODE 250: Performed by: INTERNAL MEDICINE

## 2018-03-21 PROCEDURE — 83735 ASSAY OF MAGNESIUM: CPT

## 2018-03-21 PROCEDURE — 99231 SBSQ HOSP IP/OBS SF/LOW 25: CPT | Mod: ,,, | Performed by: HOSPITALIST

## 2018-03-21 PROCEDURE — 97112 NEUROMUSCULAR REEDUCATION: CPT

## 2018-03-21 RX ORDER — MIRTAZAPINE 15 MG/1
15 TABLET, FILM COATED ORAL NIGHTLY
Qty: 30 TABLET | Refills: 11 | Status: SHIPPED | OUTPATIENT
Start: 2018-03-21 | End: 2019-03-21

## 2018-03-21 RX ORDER — LISINOPRIL 10 MG/1
10 TABLET ORAL DAILY
Qty: 90 TABLET | Refills: 3 | Status: SHIPPED | OUTPATIENT
Start: 2018-03-22 | End: 2019-03-22

## 2018-03-21 RX ADMIN — MIRTAZAPINE 15 MG: 15 TABLET, FILM COATED ORAL at 09:03

## 2018-03-21 RX ADMIN — PANTOPRAZOLE SODIUM 40 MG: 40 TABLET, DELAYED RELEASE ORAL at 09:03

## 2018-03-21 RX ADMIN — ENOXAPARIN SODIUM 40 MG: 100 INJECTION SUBCUTANEOUS at 06:03

## 2018-03-21 RX ADMIN — LISINOPRIL 10 MG: 10 TABLET ORAL at 09:03

## 2018-03-21 RX ADMIN — ATORVASTATIN CALCIUM 40 MG: 20 TABLET, FILM COATED ORAL at 09:03

## 2018-03-21 RX ADMIN — SODIUM CHLORIDE, SODIUM LACTATE, POTASSIUM CHLORIDE, CALCIUM CHLORIDE, AND DEXTROSE MONOHYDRATE: 600; 310; 30; 20; 5 INJECTION, SOLUTION INTRAVENOUS at 03:03

## 2018-03-21 NOTE — SUBJECTIVE & OBJECTIVE
Interval History: Poor oral intake overnight. Patient refusing to eat/swallow.    Medications:  Continuous Infusions:   dextrose 5% lactated ringers 75 mL/hr at 03/21/18 0321     Scheduled Meds:   atorvastatin  40 mg Oral Daily    enoxaparin  40 mg Subcutaneous Daily    lisinopril  10 mg Oral Daily    mirtazapine  15 mg Oral QHS    pantoprazole  40 mg Oral Daily     PRN Meds:acetaminophen, dextrose 50%, dextrose 50%, glucagon (human recombinant), glucose, glucose, hydrocodone-acetaminophen 5-325mg, ondansetron, ramelteon, sodium chloride 0.9%, sodium chloride 0.9%    Objective:     Vital Signs (Most Recent):  Temp: 97.2 °F (36.2 °C) (03/21/18 0800)  Pulse: 81 (03/21/18 0800)  Resp: 18 (03/21/18 0800)  BP: (!) 162/88 (03/21/18 0800)  SpO2: 98 % (03/21/18 0800) Vital Signs (24h Range):  Temp:  [97.2 °F (36.2 °C)-98.4 °F (36.9 °C)] 97.2 °F (36.2 °C)  Pulse:  [] 81  Resp:  [16-18] 18  SpO2:  [92 %-98 %] 98 %  BP: (136-179)/() 162/88     Weight: 47.2 kg (104 lb 0.9 oz)  Body mass index is 19.66 kg/m².    Review of Symptoms  Symptom Assessment (ESAS 0-10 scale)  ESAS 0 1 2 3 4 5 6 7 8 9 10   Pain              Dyspnea              Anxiety              Nausea              Depression               Anorexia              Fatigue              Insomnia              Restlessness               Agitation              CAM / Delirium __ --  ___+   Constipation     __ --  ___+   Diarrhea           __ --  ___+  Bowel Management Plan (BMP): No    Comments: No oral intake    Pain Assessment: None    OME in 24 hours:none  Performance Status: 30    ECOG Performance Status Grade: 4 - Completely disabled    Physical Exam   Neurological: She is alert.   Shakes her head yes/no to questions.   Nursing note and vitals reviewed.      Significant Labs: All pertinent labs within the past 24 hours have been reviewed.  CBC:     Recent Labs  Lab 03/21/18  0519   WBC 7.33   HGB 9.5*   HCT 32.1*   MCV 98        BMP:    Recent  Labs  Lab 03/21/18  0519         K 4.2      CO2 24   BUN 5*   CREATININE 0.9   CALCIUM 9.0   MG 1.4*     LFT:  Lab Results   Component Value Date    AST 28 03/21/2018    ALKPHOS 103 03/21/2018    BILITOT 0.5 03/21/2018     Albumin:   Albumin   Date Value Ref Range Status   03/21/2018 2.1 (L) 3.5 - 5.2 g/dL Final     Protein:   Total Protein   Date Value Ref Range Status   03/21/2018 5.5 (L) 6.0 - 8.4 g/dL Final     Lactic acid:   Lab Results   Component Value Date    LACTATE 1.3 03/06/2018       Significant Imaging: I have reviewed all pertinent imaging results/findings within the past 24 hours.    Advanced Directives::  Living Will: No  LaPOST: No  Do Not Resuscitate Status: Yes  Medical Power of : No    Decision-Making Capacity: Patient answered questions, Family answered questions. Son Scooter is making decisions on behalf of his other two brothers and patient.  Living Arrangements: Lives alone, Lives in home, [place] [facility]    Psychosocial/Cultural:  Patient's most important priorities:  Getting home    Patient's biggest concerns/fears:  Getting home    Previous death/end of life care history:  Getting home    Patient's goals/hopes:  Getting home    Spiritual:     F- Miryam and Belief: Seen by  yesterday.  I - Importance: Seen by  yesterday.  C - Community: Seen by  yesterday.  A - Address in Care:Seen by  yesterday.

## 2018-03-21 NOTE — PROGRESS NOTES
"[facility]  Palliative Medicine  Progress Note    Patient Name: Lisbeth Seaman  MRN: [id number]  Admission Date: 3/6/2018  Hospital Length of Stay: 15 days  Code Status: DNR   Attending Provider: Kanika Aaron MD  Consulting Provider: Ashlie Deutsch MD  Primary Care Physician: Rob Valladares MD  Principal Problem:Encephalopathy, metabolic    Patient information was obtained from patient, relative(s), caregiver / friend and past medical records.      Assessment/Plan:     Palliative care encounter    2018 Patient to be discharged with [facility] in [place]. Arrangements being made. Discussed with team. Patient refuses to eat.  3/20/2018 Discussed further with son, his girlfriend, patient and patient's sister. Still thinking about taking her home with hospice. Patient refuses to eat and refuses feeding tube. See HPI.     3/19/18 Son was in room at 1:15 pm when I returned. He put his phone on speaker and we talked with Scooter's girlfriend John who cared for Lisbeth Seaman's  who  on hospice after stopping HD and had a double lung transplant. Son attempting to feed her and reminding her to swallow. She hold food in her mouth. She was asked about resuscitation and she, her son Scooter and girlfriend John all agreed that she would not want to be resuscitated if her heart should stop. A DNR order will be entered into the chart.  WE then talked about feeding tubes. The patient nodded that she would not want one placed and would pull it out if it was. She wants to "live" but wants to go home and not have a feeding tube.John (son's girlfriend) thinks that bringing her home and having hospice would be the best plan. Scooter will be here this afternoon and will try to feed her her favorite thing-chocolate pudding and chocolate cake. He will be back in the morning and we will discuss further then.            I will sign off. Please contact us if you have any " additional questions.    Subjective:     Chief Complaint: No chief complaint on file.      HPI:   Asked to see patient to help delineate goals of care. Son will be here later.  62 y/o WF with PMH of HTN, vascular dementia, and NPH [place] [facility] [place] to thrive since hospital discharge three days prior.  Pt improved with therapeutic LP for NPH, and Neurosurgery placed  shunt 3/12.  Pt has improved somewhat in terms of with speech and alertnessness, but less so with mobility.  Mirtazapine started for poor appetite. She is full code.  I was able to ask patient questions and she was able to shake her head yes/no.  I asked if her name was Deven Burger which she said no and Jurgen where she shook her head yes. [place](yes[place] I asked if she was hungry and she shook her head no. I asked if she wanted anything and I listed foods such as ice cream, Snickers, orange juice she said no. I asked if she knew what would happen if she did not eat, she shook her head yes. I asked if she knew that if she didn't eat she would die, and she shook her head yes. I asked if she was ok with that and she shrugged her shoulders. I asked if she was sad and she said no. She shook her head yes when I asked her about work - [employer], .   Son was in room at 1:15 pm when I returned. He put his phone on speaker and we talked with Scooter's girlfriend John who cared for Lisbeth Seaman's  who  on hospice after stopping HD and had a double lung transplant. Son attempting to feed her and reminding her to swallow. She hold food in her mouth. She was asked about resuscitation and she, her son Scooter and girlfriend John all agreed that she would not want to be resuscitated if her heart should stop. A DNR order will be entered into the chart.  WE then talked about feeding tubes. The patient nodded that she would not want one placed and would pull it out if it  "was. She wants to "live" but wants to go home and not have a feeding tube.John (son's girlfriend) thinks that bringing her home and having hospice would be the best plan. Scooter will be here this afternoon and will try to feed her her favorite thing-chocolate pudding and chocolate cake. He will be back in the morning and we will discuss further then.    Hospital Course:  No notes on file    Interval History: Poor oral intake overnight. Patient refusing to eat/swallow.    Medications:  Continuous Infusions:   dextrose 5% lactated ringers 75 mL/hr at 03/21/18 0321     Scheduled Meds:   atorvastatin  40 mg Oral Daily    enoxaparin  40 mg Subcutaneous Daily    lisinopril  10 mg Oral Daily    mirtazapine  15 mg Oral QHS    pantoprazole  40 mg Oral Daily     PRN Meds:acetaminophen, dextrose 50%, dextrose 50%, glucagon (human recombinant), glucose, glucose, hydrocodone-acetaminophen 5-325mg, ondansetron, ramelteon, sodium chloride 0.9%, sodium chloride 0.9%    Objective:     Vital Signs (Most Recent):  Temp: 97.2 °F (36.2 °C) (03/21/18 0800)  Pulse: 81 (03/21/18 0800)  Resp: 18 (03/21/18 0800)  BP: (!) 162/88 (03/21/18 0800)  SpO2: 98 % (03/21/18 0800) Vital Signs (24h Range):  Temp:  [97.2 °F (36.2 °C)-98.4 °F (36.9 °C)] 97.2 °F (36.2 °C)  Pulse:  [] 81  Resp:  [16-18] 18  SpO2:  [92 %-98 %] 98 %  BP: (136-179)/() 162/88     Weight: 47.2 kg (104 lb 0.9 oz)  Body mass index is 19.66 kg/m².    Review of Symptoms  Symptom Assessment (ESAS 0-10 scale)  ESAS 0 1 2 3 4 5 6 7 8 9 10   Pain x             Dyspnea x             Anxiety x             Nausea x             Depression  x             Anorexia x             Fatigue x             Insomnia x             Restlessness  x             Agitation x             CAM / Delirium _x_ --  ___+   Constipation     _x_ --  ___+   Diarrhea           _x_ --  ___+  Bowel Management Plan (BMP): No    Comments: No oral intake    Pain Assessment: None    OME in 24 " hours:none  Performance Status: 30    ECOG Performance Status Grade: 4 - Completely disabled    Physical Exam   Neurological: She is alert.   Shakes her head yes/no to questions.   Nursing note and vitals reviewed.      Significant Labs: All pertinent labs within the past 24 hours have been reviewed.  CBC:     Recent Labs  Lab 03/21/18 0519   WBC 7.33   HGB 9.5*   HCT 32.1*   MCV 98        BMP:    Recent Labs  Lab 03/21/18 0519         K 4.2      CO2 24   BUN 5*   CREATININE 0.9   CALCIUM 9.0   MG 1.4*     LFT:  Lab Results   Component Value Date    AST 28 03/21/2018    ALKPHOS 103 03/21/2018    BILITOT 0.5 03/21/2018     Albumin:   Albumin   Date Value Ref Range Status   03/21/2018 2.1 (L) 3.5 - 5.2 g/dL Final     Protein:   Total Protein   Date Value Ref Range Status   03/21/2018 5.5 (L) 6.0 - 8.4 g/dL Final     Lactic acid:   Lab Results   Component Value Date    LACTATE 1.3 03/06/2018       Significant Imaging: I have reviewed all pertinent imaging results/findings within the past 24 hours.    Advanced Directives::  Living Will: No  LaPOST: No  Do Not Resuscitate Status: Yes  Medical Power of : No    Decision-Making Capacity: Patient answered questions, Family answered questions. Son Scooter is making decisions on behalf of his other two brothers and patient.  Living Arrangements: Lives alone, Lives in home, Lives [place] from [facility]    Psychosocial/Cultural:  Patient's most important priorities:  Getting home    Patient's biggest concerns/fears:  Getting home    Previous death/end of life care history:  Getting home    Patient's goals/hopes:  Getting home    Spiritual:     F- Miryam and Belief: Seen by  yesterday.  I - Importance: Seen by  yesterday.  C - Community: Seen by  yesterday.  A - Address in Care:Seen by  yesterday.      > 50% of 20 min visit spent in chart review, face to face discussion of goals of care,  symptom assessment,  coordination of care and emotional support.    Ashlie Deutsch MD  Palliative Medicine  [facility]

## 2018-03-21 NOTE — CHAPLAIN
Facts (Spiritual Perception of Illness): Patient has had stroke and fluid buildup in the brain, stint place to drain fluid.  Patient is responsive but seems sad about her situation, not able to talk much but responds with head nods and one word answers.      Feelings (Emotions/Experiences/Coping):  Patient seems sad but not able to express verbally her feelings. Patient's son is anxious and concerned that patient is not interested in eating.        Family/Friends:  Patient's son and girlfriend were present.  Son says he works at night and sits with pt. During the day.  He is very concerned about whether patient will start eating or not      Miryam (Belief/Meaning/Philosophy): No miryam expressed openly but appreciation and approval was given for prayer, so miryam is present.    Future (Spiritual Care Plan of Action): Continue to provide  visits to listen and encourage patient and family sharing concerning anxiety about illness.

## 2018-03-21 NOTE — PLAN OF CARE
[facility]     Department of Hospital Medicine     [address] [place] [address]     [phone] after hours  [phone] fax                                   HOSPICE  ORDERS     03/22/2018    Admit to Hospice:  Home Service     Diagnoses:  Active Hospital Problems    Diagnosis  POA    *Encephalopathy, metabolic [G93.41]  Yes     Chronic    Palliative care encounter [Z51.5]  Not Applicable    Failure to thrive in adult [R62.7]  Unknown    Normal pressure hydrocephalus [G91.2]  Yes    Debility [R53.81]  Yes    Vascular dementia without behavioral disturbance [F01.50]  Yes    Hypertension, essential [I10]  Yes    Hypophosphatemia [E83.39]  Yes    Hypokalemia [E87.6]  Yes    Hyperlipidemia [E78.5]  Yes      Resolved Hospital Problems    Diagnosis Date Resolved POA    Acute cystitis with hematuria [N30.01] 03/08/2018 Yes       Hospice Qualifying Diagnoses:       Patient has a life expectancy < 6 months due to these conditions.    Vital Signs: Routine per Hospice Protocol.    Allergies:Review of patient's allergies indicates:  No Known Allergies    Diet: Regular    Activities: As tolerated    Nursing: Per Hospice Routine    Future Orders:  Hospice Medical Director may dictate new orders for comfortable care measures & sign death certificate.    Medications:         Comfort Care Medications Only     Lisbeth Seaman   Home Medication Instructions DIVINA:[id number]    Printed on:03/21/18 0935   Medication Information                      aspirin 81 MG Chew  Take 81 mg by mouth once daily.             atorvastatin (LIPITOR) 40 MG tablet  Take 1 tablet (40 mg total) by mouth once daily.             lisinopril 10 MG tablet  Take 1 tablet (10 mg total) by mouth once daily.             mirtazapine (REMERON) 15 MG tablet  Take 1 tablet (15 mg total) by mouth every evening.                       _________________________________  Mika Valiente  MD  03/22/2018

## 2018-03-21 NOTE — ASSESSMENT & PLAN NOTE
"2018 Patient to be discharged with [facility] in [place] Arrangements being made. Discussed with team. Patient refuses to eat.  3/20/2018 Discussed further with son, his girlfriend, patient and patient's sister. Still thinking about taking her home with hospice. Patient refuses to eat and refuses feeding tube. See HPI.     3/19/18 Son was in room at 1:15 pm when I returned. He put his phone on speaker and we talked with Scooter's girlfriend John who cared for Lisbeth Seaman's  who  on hospice after stopping HD and had a double lung transplant. Son attempting to feed her and reminding her to swallow. She hold food in her mouth. She was asked about resuscitation and she, her son Scooter and girlfriend John all agreed that she would not want to be resuscitated if her heart should stop. A DNR order will be entered into the chart.  WE then talked about feeding tubes. The patient nodded that she would not want one placed and would pull it out if it was. She wants to "live" but wants to go home and not have a feeding tube.John (son's girlfriend) thinks that bringing her home and having hospice would be the best plan. Scooter will be here this afternoon and will try to feed her her favorite thing-chocolate pudding and chocolate cake. He will be back in the morning and we will discuss further then.  "

## 2018-03-21 NOTE — PLAN OF CARE
Problem: Physical Therapy Goal  Goal: Physical Therapy Goal  Goals to be met by: 2018     Patient will increase functional independence with mobility by performin. Supine to sit with Moderate Assistance  2. Sit to supine with Moderate Assistance  3. Bed to chair transfer with Maximum Assistance  4. Pt will perform dynamic sitting activity x 5 minutes with supervision, no LOB to improve sitting balance   5. Lower extremity exercise program x15 reps per handout, with assistance as needed       Outcome: Ongoing (interventions implemented as appropriate)  Pt goals remain appropriate.     HALI SHEPPARD, PT  3/21/2018

## 2018-03-21 NOTE — PLAN OF CARE
"SW left messages and spoke with  for [facility] hospice throughout most of the day.  SW finally spoke to [facility] hospice and was told at 516pm that they could not take the pt on their service because they "just found out that they are not in their network."  SW spoke with pt's son who stated "I don't know what's going on, you have to call [facility]."  SW then called [facility] ([phone]) and was told that she didn't realize when she picked [facility] that they were not in their network.  It is an unsafe DC at this point to arrange transport because family has nothing set up.  BULMARO attempted to call [facility] resident but there was no answer.  BULMARO will call [facility] in the morning to attempt to set up hospice care for this pt.        Zohreh Maya, DAJUAN  "

## 2018-03-21 NOTE — PT/OT/SLP PROGRESS
Physical Therapy Treatment    Patient Name:  Lisbeth Seaman   MRN:  [id number]    Recommendations:     Discharge Recommendations:  other (see comments) (24hr A)   Discharge Equipment Recommendations: hospital bed   Barriers to discharge: Decreased caregiver support    Assessment:     Lisbeth Seaman is a 63 y.o. female admitted with a medical diagnosis of Encephalopathy, metabolic.  She presents with the following impairments/functional limitations:  weakness, decreased upper extremity function, decreased lower extremity function, impaired balance, impaired endurance, decreased safety awareness, impaired functional mobilty, impaired self care skills, impaired cognition, decreased coordination, abnormal tone, impaired skin, edema, impaired coordination, impaired fine motor, impaired muscle length, impaired sensation, decreased ROM, impaired joint extensibility. Pt performed bed mobility mod/max A and sat EOB ~12 min with CGA/SBA, verbal and tactile cues for upright posture. Pt will continue to benefit from skilled PT to improve deficits and increase overall functional mobility.     Rehab Prognosis:  Fair; patient would benefit from acute skilled PT services to address these deficits and reach maximum level of function.      Recent Surgery: Procedure(s) (LRB):   SHUNT W/ NAVIGATION (Right) 9 Days Post-Op    Plan:     During this hospitalization, patient to be seen 3 x/week to address the above listed problems via therapeutic activities, therapeutic exercises, neuromuscular re-education, wheelchair management/training  · Plan of Care Expires:  04/05/18   Plan of Care Reviewed with: patient    Subjective     Communicated with RN prior to session.  Patient found supine in bed upon PT entry to room, agreeable to treatment.      Chief Complaint: NA  Pain/Comfort:  · Pain Rating 1: 0/10  · Pain Rating Post-Intervention 1: 0/10    Patients cultural, spiritual, Bahai conflicts given the current situation: no  conflicts    Objective:     Patient found with: telemetry, peripheral IV     General Precautions: Standard, fall   Orthopedic Precautions:N/A   Braces: N/A     Functional Mobility:  Bed Mobility:     · Rolling Left:  moderate assistance  · Rolling Right: moderate assistance  · Scooting: total assistance  · Supine to Sit: max assistance  · Sit to Supine: max assistance    AM-PAC 6 CLICK MOBILITY  Turning over in bed (including adjusting bedclothes, sheets and blankets)?: 2  Sitting down on and standing up from a chair with arms (e.g., wheelchair, bedside commode, etc.): 1  Moving from lying on back to sitting on the side of the bed?: 2  Moving to and from a bed to a chair (including a wheelchair)?: 1  Need to walk in hospital room?: 1  Climbing 3-5 steps with a railing?: 1  Total Score: 8     Therapeutic Activities and Exercises:  PT applied tone inhibition to B LE and UE.   PT applied static stretch to pt in hook-lying with trunk rotation.  PT applied static stretch to pt in B side lying with hip and knee flex.  PT applied static stretch to L SCM and ant neck musculature for ~30sec hold x3 trials.   Pt sat EOB for ~12 min with CGA/SBA, verbal and tactile cues for upright posture.  Pt performed lat lean to B elbow with max A for 30 sec hold.   Pt performed B trunk rotation with ~30sec hold x2 trials.   Pt positioned with B UE propped and head in neutral.    Patient left HOB elevated with all lines intact, call button in reach, bed alarm on and RN notified..    GOALS:    Physical Therapy Goals        Problem: Physical Therapy Goal    Goal Priority Disciplines Outcome Goal Variances Interventions   Physical Therapy Goal     PT/OT, PT Ongoing (interventions implemented as appropriate)     Description:  Goals to be met by: 2018     Patient will increase functional independence with mobility by performin. Supine to sit with Moderate Assistance  2. Sit to supine with Moderate Assistance  3. Bed to chair  transfer with Maximum Assistance  4. Pt will perform dynamic sitting activity x 5 minutes with supervision, no LOB to improve sitting balance   5. Lower extremity exercise program x15 reps per handout, with assistance as needed                        Time Tracking:     PT Received On: 03/21/18  PT Start Time: 1014     PT Stop Time: 1038  PT Total Time (min): 24 min     Billable Minutes: Therapeutic Activity 10 and Neuromuscular Re-education 14    Treatment Type: Treatment  PT/PTA: PT     PTA Visit Number: 0     HALI SHEPPARD, PT  03/21/2018

## 2018-03-22 VITALS
HEIGHT: 61 IN | BODY MASS INDEX: 19.65 KG/M2 | HEART RATE: 96 BPM | SYSTOLIC BLOOD PRESSURE: 151 MMHG | RESPIRATION RATE: 18 BRPM | WEIGHT: 104.06 LBS | OXYGEN SATURATION: 96 % | DIASTOLIC BLOOD PRESSURE: 89 MMHG | TEMPERATURE: 98 F

## 2018-03-22 PROCEDURE — 97530 THERAPEUTIC ACTIVITIES: CPT

## 2018-03-22 PROCEDURE — 25000003 PHARM REV CODE 250: Performed by: STUDENT IN AN ORGANIZED HEALTH CARE EDUCATION/TRAINING PROGRAM

## 2018-03-22 PROCEDURE — 63600175 PHARM REV CODE 636 W HCPCS: Performed by: STUDENT IN AN ORGANIZED HEALTH CARE EDUCATION/TRAINING PROGRAM

## 2018-03-22 PROCEDURE — 25000003 PHARM REV CODE 250: Performed by: INTERNAL MEDICINE

## 2018-03-22 PROCEDURE — 99238 HOSP IP/OBS DSCHRG MGMT 30/<: CPT | Mod: ,,, | Performed by: HOSPITALIST

## 2018-03-22 RX ADMIN — ATORVASTATIN CALCIUM 40 MG: 20 TABLET, FILM COATED ORAL at 09:03

## 2018-03-22 RX ADMIN — ENOXAPARIN SODIUM 40 MG: 100 INJECTION SUBCUTANEOUS at 05:03

## 2018-03-22 RX ADMIN — PANTOPRAZOLE SODIUM 40 MG: 40 TABLET, DELAYED RELEASE ORAL at 09:03

## 2018-03-22 RX ADMIN — LISINOPRIL 10 MG: 10 TABLET ORAL at 09:03

## 2018-03-22 NOTE — ASSESSMENT & PLAN NOTE
- Consulted palliative care to assist in further goals of care discussions given her advanced dementia and anorexia  - Now DNR  - Palliative care had extensive discussion with family and seem to agree home hospice has the best option at this time.   - Patient expressed that she would like to go home. Will continue to follow with Palliative care's recommendations.   - Will be discharged to hospice facility.

## 2018-03-22 NOTE — SUBJECTIVE & OBJECTIVE
Interval History:     NAEON    Review of Systems   Constitutional: Positive for activity change. Negative for chills, fatigue and fever.   HENT: Negative for congestion and sore throat.    Eyes: Negative for visual disturbance.   Respiratory: Negative for cough and shortness of breath.    Cardiovascular: Negative for chest pain, palpitations and leg swelling.   Gastrointestinal: Negative for abdominal distention, abdominal pain, constipation, diarrhea, nausea and vomiting.   Endocrine: Negative for polyuria.   Genitourinary: Negative for difficulty urinating and dysuria.   Musculoskeletal: Negative for myalgias.   Skin: Negative for rash and wound.   Allergic/Immunologic: Negative for immunocompromised state.   Neurological: Positive for weakness. Negative for dizziness and numbness.   Hematological: Negative for adenopathy.   Psychiatric/Behavioral: Negative for dysphoric mood. The patient is not nervous/anxious.      Objective:     Vital Signs (Most Recent):  Temp: 97.4 °F (36.3 °C) (03/22/18 0731)  Pulse: 96 (03/22/18 0731)  Resp: 18 (03/22/18 0731)  BP: (!) 161/77 (03/22/18 0731)  SpO2: 96 % (03/22/18 0731) Vital Signs (24h Range):  Temp:  [97.4 °F (36.3 °C)-98.8 °F (37.1 °C)] 97.4 °F (36.3 °C)  Pulse:  [80-96] 96  Resp:  [16-20] 18  SpO2:  [93 %-99 %] 96 %  BP: (135-161)/(77-96) 161/77     Weight: 47.2 kg (104 lb 0.9 oz)  Body mass index is 19.66 kg/m².  No intake or output data in the 24 hours ending 03/22/18 1147   Physical Exam   Constitutional: She appears well-developed. No distress.   HENT:   Head: Normocephalic and atraumatic.   Mouth/Throat: No oropharyngeal exudate.   Eyes: EOM are normal. Pupils are equal, round, and reactive to light. No scleral icterus.   Neck: Normal range of motion. Neck supple.   Cardiovascular: Normal rate, regular rhythm, normal heart sounds and intact distal pulses.    No murmur heard.  Pulmonary/Chest: Effort normal. No respiratory distress. She has no wheezes.   Abdominal:  Soft. Bowel sounds are normal. She exhibits no distension.   Musculoskeletal: Normal range of motion. She exhibits no edema.   Diffuse weakness, 4/5 in upper extremities bilaterally, 3/5 in lower extremities bilaterally   Lymphadenopathy:     She has no cervical adenopathy.   Neurological: She is alert.   Skin: Skin is warm. She is not diaphoretic.   Diffuse ecchymoses and purpura    Psychiatric:   Able to respond with some single word answers; pleasant, alert and awake     Vitals reviewed.    Significant Labs:   BMP:   Recent Labs  Lab 03/21/18  0519         K 4.2      CO2 24   BUN 5*   CREATININE 0.9   CALCIUM 9.0   MG 1.4*     CBC:   Recent Labs  Lab 03/21/18  0519   WBC 7.33   HGB 9.5*   HCT 32.1*

## 2018-03-22 NOTE — PLAN OF CARE
BULMARO spoke with Miesha at [facility] and gave them epic access.  Miesha stated that she thought they would be able to take pt, but she will run benefits and call BULMARO back.  BULMARO called daughter in law, Akanksha, and made her apprised of situation.  Akanksha stated that she was also looking at other options to try to expedite process.  BULMARO will F/U after Miesha has reviewed case.  1128am--BULMARO spoke with Akanksha again and was told that she would like to pursue [facility] because they have their own inpatient facility, as pt has been steadily declining to the possibility of needing inpatient at some point.  BULMARO made contact with Miesha again and gave her Akanksha's contact information.  BULMARO will F/U with Miesha to arrange DC transport.  258pm--BULMARO spoke with Miesha at [facility] and was told that equipment will be at family home for 5pm and that transport can be arranged.  BULMARO spoke with miladis at [facility] ([phone]) and arranged stretcher transport for 5pm to pt's home in [place].  BULMARO called and gave Akanksha time of transport.  Transport packet printed and brought to 7th floor.        Zohreh Maya LMSW

## 2018-03-23 NOTE — PLAN OF CARE
Problem: Occupational Therapy Goal  Goal: Occupational Therapy Goal  Goals to be met by: 3/20/17-Goals remain appropriate to be met by 3/29/18    Patient will increase functional independence with ADLs by performing:    Feeding with Set-up Assistance.  UE Dressing with Minimum Assistance.  Grooming while seated EOB with Set-up assistance and verbal cues for sequencing of task.  Toileting from bedside commode with Moderate Assistance for hygiene and clothing management.   Supine to sit with Moderate Assistance.  Squat Pivot transfer EOB->bedside chair/wheelchair with Max Assistance.  Toilet transfer Squat Pivot to bedside commode with Max Assistance.      Outcome: Ongoing (interventions implemented as appropriate)  OT goals extended.  EZEQIUEL Manuel  3/22/2018

## 2018-03-23 NOTE — PT/OT/SLP PROGRESS
Occupational Therapy   Treatment    Name: Lisbeth Seaman  MRN: [id number]  Admitting Diagnosis:  Encephalopathy, metabolic  11 Days Post-Op    Recommendations:     Discharge Recommendations:  (pt to d/c to inpatient hospice)  Discharge Equipment Recommendations:  none (pt to d/c to inpatient hospice)  Barriers to discharge:  Decreased caregiver support    Subjective     Communicated with: RN prior to session.  Pain/Comfort:  · Pain Rating 1:  (pt reported pain when sitting upright; did not rate on scale)    Objective:     Patient found with: telemetry, peripheral IV    General Precautions: Standard, fall   Orthopedic Precautions:N/A   Braces: N/A     Occupational Performance:    Bed Mobility:    · Patient completed Scooting/Bridging with total assistance  · Patient completed Supine to Sit with total assistance  · Patient completed Sit to Supine with total assistance     Patient left right sidelying with all lines intact, call button in reach and bed alarm on    AMPA 6 Click:  [facility] Total Score: 9    Treatment & Education:  Pt tolerated BUE/BLE PROM/tone inhibition in order to increase tissue elasticity/functional ROM for participation in ADLs  OT attempted to have pt sit EOB for participation in ADLs; once EOB, pt groaning and nodded head to report pain/desire to return to supine  Education:    Assessment:     Lisbeth Seaman is a 63 y.o. female with a medical diagnosis of Encephalopathy, metabolic.  She presents with increased tone in BUE/BLE, impaired cognition,  and limited tolerance for EOB activity today due to pain.  Performance deficits affecting function are weakness, impaired endurance, impaired self care skills, impaired functional mobilty, impaired balance, impaired cognition, decreased coordination, decreased upper extremity function, decreased lower extremity function, abnormal tone, pain, decreased safety awareness, impaired coordination, impaired fine motor.      Rehab Prognosis:  fair; patient would  benefit from acute skilled OT services to address these deficits and reach maximum level of function.       Plan:     Patient to be seen 2 x/week to address the above listed problems via self-care/home management, therapeutic activities, therapeutic exercises, neuromuscular re-education  · Plan of Care Expires: 04/12/18  · Plan of Care Reviewed with: patient    This Plan of care has been discussed with the patient who was involved in its development and understands and is in agreement with the identified goals and treatment plan    GOALS:    Occupational Therapy Goals        Problem: Occupational Therapy Goal    Goal Priority Disciplines Outcome Interventions   Occupational Therapy Goal     OT, PT/OT Ongoing (interventions implemented as appropriate)    Description:  Goals to be met by: 3/20/17-Goals remain appropriate to be met by 3/29/18    Patient will increase functional independence with ADLs by performing:    Feeding with Set-up Assistance.  UE Dressing with Minimum Assistance.  Grooming while seated EOB with Set-up assistance and verbal cues for sequencing of task.  Toileting from bedside commode with Moderate Assistance for hygiene and clothing management.   Supine to sit with Moderate Assistance.  Squat Pivot transfer EOB->bedside chair/wheelchair with Max Assistance.  Toilet transfer Squat Pivot to bedside commode with Max Assistance.                        Time Tracking:     OT Date of Treatment: 03/22/18  OT Start Time: 1350  OT Stop Time: 1415  OT Total Time (min): 25 min    Billable Minutes:Therapeutic Activity 25    EZEQUIEL Manuel  3/22/2018

## 2018-03-26 NOTE — PT/OT/SLP DISCHARGE
Occupational Therapy Discharge Summary    Lisbeth Seaman  MRN: [id number]   Principal Problem: Encephalopathy, metabolic      Patient Discharged from acute Occupational Therapy on 3/22/18.  Please refer to prior OT note dated 3/22/18 for functional status.    Assessment:      Patient appropriate for care in another setting.    Objective:     GOALS:    Occupational Therapy Goals        Problem: Occupational Therapy Goal    Goal Priority Disciplines Outcome Interventions   Occupational Therapy Goal     OT, PT/OT Ongoing (interventions implemented as appropriate)    Description:  Goals to be met by: 3/20/17-Goals remain appropriate to be met by 3/29/18    Patient will increase functional independence with ADLs by performing:    Feeding with Set-up Assistance.  UE Dressing with Minimum Assistance.  Grooming while seated EOB with Set-up assistance and verbal cues for sequencing of task.  Toileting from bedside commode with Moderate Assistance for hygiene and clothing management.   Supine to sit with Moderate Assistance.  Squat Pivot transfer EOB->bedside chair/wheelchair with Max Assistance.  Toilet transfer Squat Pivot to bedside commode with Max Assistance.                        Reasons for Discontinuation of Therapy Services  Transfer to alternate level of care.      Plan:     Patient Discharged to: Palliative Care/Hospice-inpatient    EZEQUIEL Manuel  3/26/2018

## 2018-04-03 NOTE — PT/OT/SLP DISCHARGE
Physical Therapy Discharge Summary    Name: Lisbeth Seaman  MRN: [id number]   Principal Problem: Encephalopathy, metabolic     Patient Discharged from acute Physical Therapy on 2018.  Please refer to prior PT noted date on 2018 for functional status.     Assessment:     Patient was discharged unexpectedly.  Information required to complete an accurate discharge summary is unknown.  Refer to therapy initial evaluation and last progress note for initial and most recent functional status and goal achievement.  Recommendations made may be found in medical record.    Objective:     GOALS:    Physical Therapy Goals     Not on file          Multidisciplinary Problems (Resolved)        Problem: Physical Therapy Goal    Goal Priority Disciplines Outcome Goal Variances Interventions   Physical Therapy Goal   (Resolved)     PT/OT, PT Outcome(s) achieved     Description:  Goals to be met by: 2018     Patient will increase functional independence with mobility by performin. Supine to sit with Moderate Assistance  2. Sit to supine with Moderate Assistance  3. Bed to chair transfer with Maximum Assistance  4. Pt will perform dynamic sitting activity x 5 minutes with supervision, no LOB to improve sitting balance   5. Lower extremity exercise program x15 reps per handout, with assistance as needed                        Reasons for Discontinuation of Therapy Services  Transfer to alternate level of care.      Plan:     Patient Discharged to: Palliative Care/Hospice.    HALI SHEPPARD, PT  4/3/2018

## 2019-06-08 NOTE — NURSING
Dr. Isbell present at this time reported to her that lab called and stated that blood clotted before they could get platelets and if they want pt to be re stuck. She stated no need to at this time.    no fever and no chills.

## 2023-06-22 NOTE — SUBJECTIVE & OBJECTIVE
Past Medical History:   Diagnosis Date    Hyperlipidemia     Hypertension     Stroke 09/2017    residual deficits are ambulates on her own with a limp, but has limited use of her right arm; emotional lability    Vascular dementia     since 9/2017 stroke       No past surgical history on file.    Review of patient's allergies indicates:  No Known Allergies    No current facility-administered medications on file prior to encounter.      Current Outpatient Prescriptions on File Prior to Encounter   Medication Sig    aspirin 81 MG Chew Take 81 mg by mouth once daily.    atorvastatin (LIPITOR) 40 MG tablet Take 40 mg by mouth once daily.     magnesium oxide (MAG-OX) 400 mg tablet Take 1 tablet (400 mg total) by mouth 2 (two) times daily.    potassium chloride (K-TAB) 20 mEq Take 20 mEq by mouth once daily.      Family History     None        Social History Main Topics    Smoking status: Former Smoker    Smokeless tobacco: Not on file    Alcohol use 8.4 oz/week     14 Shots of liquor per week    Drug use: No    Sexual activity: Not on file     Review of Systems   Constitutional: Positive for activity change and appetite change.   HENT: Positive for trouble swallowing.      Objective:     Vital Signs (Most Recent):  Temp: 98.1 °F (36.7 °C) (03/06/18 1238)  Pulse: 76 (03/06/18 1931)  Resp: 16 (03/06/18 1931)  BP: 126/78 (03/06/18 1931)  SpO2: 96 % (03/06/18 1931) Vital Signs (24h Range):  Temp:  [98.1 °F (36.7 °C)] 98.1 °F (36.7 °C)  Pulse:  [] 76  Resp:  [16-18] 16  SpO2:  [96 %-99 %] 96 %  BP: (112-155)/(78-89) 126/78     Weight: 47.2 kg (104 lb)  Body mass index is 19.65 kg/m².    Physical Exam        Significant Labs:   Recent Lab Results       03/06/18  1525 03/06/18  1420 03/06/18  1419      Albumin   2.3(L)     Alkaline Phosphatase   70     ALT   15     Anion Gap   14     Appearance, UA Hazy(A)       AST   17     Bacteria, UA Many(A)       Baso #  0.00      Basophil%  0.3      Bilirubin (UA)  Negative       Total Bilirubin   0.4  Comment:  For infants and newborns, interpretation of results should be based  on gestational age, weight and in agreement with clinical  observations.  Premature Infant recommended reference ranges:  Up to 24 hours.............<8.0 mg/dL  Up to 48 hours............<12.0 mg/dL  3-5 days..................<15.0 mg/dL  6-29 days.................<15.0 mg/dL       BUN, Bld   20     Calcium   9.0     Chloride   108     CO2   24     Color, UA Yellow       Creatinine   0.8     Differential Method  Automated      eGFR if    >60     eGFR if non    >60  Comment:  Calculation used to obtain the estimated glomerular filtration  rate (eGFR) is the CKD-EPI equation.        Eos #  0.0      Eosinophil%  0.1      Glucose   93     Glucose, UA Negative       Gran # (ANC)  5.5      Gran%  79.8(H)      Hematocrit  30.0(L)      Hemoglobin  9.9(L)      Hyaline Casts, UA 1       Ketones, UA 1+(A)       Lactate, Carl   1.3  Comment:  Falsely low lactic acid results can be found in samples   containing >=13.0 mg/dL total bilirubin and/or >=3.5 mg/dL   direct bilirubin.       Leukocytes, UA 1+(A)       Lymph #  1.1      Lymph%  16.2(L)      Magnesium   1.9     MCH  30.0      MCHC  33.1      MCV  91      Microscopic Comment SEE COMMENT  Comment:  Other formed elements not mentioned in the report are not   present in the microscopic examination.          Mono #  0.2(L)      Mono%  3.6(L)      MPV  7.2(L)      Nitrite, UA Positive(A)       Occult Blood UA 2+(A)       pH, UA 6.0       Phosphorus   1.9(L)     Platelet Estimate  Appears normal      Platelets  193      Potassium   3.6     Total Protein   6.8     Protein, UA 1+  Comment:  Recommend a 24 hour urine protein or a urine   protein/creatinine ratio if globulin induced proteinuria is  clinically suspected.  (A)       RBC  3.32(L)      RBC, UA 8(H)       RDW  15.2(H)      Sodium   146(H)     Specific Black, UA 1.025        Specimen UA Urine, Catheterized       Squam Epithel, UA 2       Urobilinogen, UA Negative       WBC, UA 35(H)       WBC  6.90            Significant Imaging: I have reviewed all pertinent imaging results/findings within the past 24 hours.   19:15

## 2024-04-30 NOTE — PT/OT/SLP PROGRESS
Occupational Therapy   Treatment    Name: Lisbeth Seaman  MRN: [id number]  Admitting Diagnosis:  Encephalopathy, metabolic       Recommendations:     Discharge Recommendations: nursing facility, skilled  Discharge Equipment Recommendations:  bedside commode  Barriers to discharge:  Other (Comment) (increased level of assist required)    Subjective     Communicated with: RN prior to session.  Pain/Comfort:  · Pain Rating 1: 0/10  · Pain Rating Post-Intervention 1: 0/10    Patients cultural, spiritual, Rastafari conflicts given the current situation:      Objective:     Patient found with: telemetry, peripheral IV, bed alarm    General Precautions: Standard, aspiration, fall   Orthopedic Precautions:N/A   Braces: N/A     Occupational Performance:    Bed Mobility:    · Patient completed Scooting/Bridging with total assistance  · Patient completed Supine to Sit with maximal assistance; max verbal/tactile cues to initiate/sequence task  · Patient completed Sit to Supine with maximal assistance     Activities of Daily Living:  Pt performed grooming tasks at EOB as follows:  · Hair combing- pt combed front of hair without assist but declined to attempt combing the remainder of hair  · Oral care- Pt began to complete task following set-up from OT with cues for initiation/sequencing (limited participation 2* pt removing toothbrush from mouth and stating she did not like taste of toothpaste);  pt used RUE functionally holding cup to rinse mouth     Sitting balance:  Pt sat EOB while performing ADL tasks with CGA-Min A (occasional min A due to posterior/L lateral lean)    Pt performed dynamic sitting activity incorporating crossing midline, weight shifting, core control, and unilateral reaching ( alternating BUE) to increase dynamic balance, strength, and endurance for increased independence with ADL tasks. Pt required CGA-Min A for stability     Pt noted to demonstrate rounded shoulder/head forward posture at  EOB    Additional:  Pt tolerated gentle BUE PROM to increase tissue elasticity/ROM in BUEs as required for ADL task completion; pt with notable tightness in anterior chest musculature and B shoulders R>L    Patient left with bed in chair position with all lines intact, call button in reach and bed alarm on    AMPAC 6 Click:  AMPA Total Score: 13    Education:    Assessment:     Lisbeth Seaman is a 63 y.o. female with a medical diagnosis of Encephalopathy, metabolic.    Performance deficits affecting function are weakness, impaired endurance, impaired self care skills, impaired functional mobilty, impaired cognition, impaired balance, decreased upper extremity function, decreased lower extremity function, decreased safety awareness, abnormal tone, impaired coordination.  Pt with limited motivation to participate in self-care tasks.  Sat EOB for self-care with Min-CGA.  Limited by generalized weakness and instability in L ankle preventing safety/ ability to progress with functional transfers.  Delayed processing noted requiring cues for sequencing/initiation of self-care/mobility tasks.      Rehab Prognosis:  fair; patient would benefit from acute skilled OT services to address these deficits and reach maximum level of function.       Plan:     Patient to be seen 4 x/week to address the above listed problems via self-care/home management, therapeutic activities, therapeutic exercises, neuromuscular re-education, cognitive retraining  · Plan of Care Expires: 04/06/18  · Plan of Care Reviewed with: patient, son    This Plan of care has been discussed with the patient who was involved in its development and understands and is in agreement with the identified goals and treatment plan    GOALS:    Occupational Therapy Goals        Problem: Occupational Therapy Goal    Goal Priority Disciplines Outcome Interventions   Occupational Therapy Goal     OT, PT/OT Ongoing (interventions implemented as appropriate)     Description:  Goals to be met by: 7 days     Patient will increase functional independence with ADLs by performing:    Feeding with Set-up Assistance.  UE Dressing with Set-up Assistance.  LE Dressing with Moderate Assistance.  Grooming while seated EOB with Stand-by/Set-up assistance.  Toileting from bedside commode with Moderate Assistance for hygiene and clothing management.   Supine to sit with Minimal Assistance.  Stand pivot transfer EOB->bedside chair with Moderate Assistance using RW.  Toilet transfer EOB->bedside commode with Moderate Assistance using RW.                      Time Tracking:     OT Date of Treatment: 03/09/18  OT Start Time: 1351  OT Stop Time: 1424  OT Total Time (min): 33 min   Co-tx with PT    Billable Minutes:Self Care/Home Management 18  Therapeutic Activity 15    EZEQUIEL Manuel  3/9/2018     (2) cough or sneeze

## 2025-04-14 NOTE — ASSESSMENT & PLAN NOTE
- Stable  - No surgical intervention while inpatient  - Therapeutic/diagnostic LP inpatient as described above  - Will refer to NSGY upon discharge for shunt eval   Depressor Anguli Oris Units: 0 Document As Units Or Cc?: units Post-Care Instructions: Patient instructed to not lie down for 4 hours and limit physical activity for 24 hours. Consent: Written consent obtained. Risks include but not limited to lid/brow ptosis, bruising, swelling, diplopia, temporary effect, incomplete chemical denervation. Expiration Date (Month Year): 4/2027 Dilution (U/0.1 Cc): 1.2 Forehead Units/Cc: 6 Glabellar Complex Units: 12 Lot #: [id number] Including Pricing Information In The Note: No Price (Use Numbers Only, No Special Characters Or $): 301 Detail Level: Detailed

## (undated) DEVICE — BUR BONE CUT MICRO TPS 3X3.8MM

## (undated) DEVICE — TUBE FRAZIER 5MM 2FT SOFT TIP

## (undated) DEVICE — DRAPE INCISE IOBAN 2 23X17IN

## (undated) DEVICE — SEE MEDLINE ITEM 156905

## (undated) DEVICE — DIFFUSER

## (undated) DEVICE — SUT SILK 2-0 SH 18IN BLACK

## (undated) DEVICE — PASSER CATH SHORT 55 CM

## (undated) DEVICE — TRACKER PATIENT NON INVASIVE

## (undated) DEVICE — PEN NEURO ENDOSCOPE RIGID

## (undated) DEVICE — SUT GUT PL. 4-0 27 FS-2

## (undated) DEVICE — CATH PASSER DISPOSABLE

## (undated) DEVICE — SUT 2-0 12-18IN SILK

## (undated) DEVICE — CLOSURE SKIN STERI STRIP 1/4X3

## (undated) DEVICE — NDL HYPO REG 25G X 1 1/2

## (undated) DEVICE — SUT VICRYL PLUS 3-0 SH 18IN

## (undated) DEVICE — SPRAY MASTISOL

## (undated) DEVICE — TUBING INSUFFLATION HEATED

## (undated) DEVICE — SUT 4/0 18IN NUROLON BLK B

## (undated) DEVICE — KIT ANTIFOG

## (undated) DEVICE — TRACER ZIEM POINTER

## (undated) DEVICE — TROCAR ENDOPATH XCEL 15MM 10CM

## (undated) DEVICE — DURAPREP SURG SCRUB 26ML

## (undated) DEVICE — CORD BIPOLAR 12 FOOT

## (undated) DEVICE — ELECTRODE REM PLYHSV RETURN 9

## (undated) DEVICE — SEE MEDLINE ITEM 157131

## (undated) DEVICE — CLIP MED TICALL

## (undated) DEVICE — BIT DRILL PERFORATOR DISP 14MM

## (undated) DEVICE — MARKER SKIN STND TIP BLUE BARR

## (undated) DEVICE — CARTRIDGE OIL

## (undated) DEVICE — SHEET EENT SPLIT

## (undated) DEVICE — CATH ALL PUR URTHL RR 10FR

## (undated) DEVICE — DRESSING TRANS 4X4 TEGADERM

## (undated) DEVICE — CLOSURE SKIN STERI STRIP 1/2X4

## (undated) DEVICE — TROCAR ENDOPATH XCEL 5MM 7.5CM

## (undated) DEVICE — SUT SILK BLK BR. 2 2-60

## (undated) DEVICE — DRESSING TELFA STRL 4X3 LF

## (undated) DEVICE — SUT MONOCRYL 4-0 PS-1 UND

## (undated) DEVICE — STAPLER SKIN PROXIMATE WIDE

## (undated) DEVICE — TAPE SURG MEDIPORE 6X72IN

## (undated) DEVICE — ELECTRODE BLADE INSULATED 1 IN

## (undated) DEVICE — SUT CTD VICRYL 0 UND BR

## (undated) DEVICE — SUT VICRYL PLUS 0 CT1 18IN

## (undated) DEVICE — KIT SURGIFLO HEMOSTATIC MATRIX